# Patient Record
Sex: FEMALE | Race: WHITE | Employment: OTHER | URBAN - METROPOLITAN AREA
[De-identification: names, ages, dates, MRNs, and addresses within clinical notes are randomized per-mention and may not be internally consistent; named-entity substitution may affect disease eponyms.]

---

## 2018-08-25 ENCOUNTER — APPOINTMENT (OUTPATIENT)
Dept: CT IMAGING | Age: 83
End: 2018-08-25
Payer: MEDICARE

## 2018-08-25 ENCOUNTER — APPOINTMENT (OUTPATIENT)
Dept: GENERAL RADIOLOGY | Age: 83
End: 2018-08-25
Payer: MEDICARE

## 2018-08-25 ENCOUNTER — HOSPITAL ENCOUNTER (EMERGENCY)
Age: 83
Discharge: HOME | End: 2018-08-25
Attending: EMERGENCY MEDICINE
Payer: MEDICARE

## 2018-08-25 VITALS
SYSTOLIC BLOOD PRESSURE: 140 MMHG | RESPIRATION RATE: 16 BRPM | BODY MASS INDEX: 21.17 KG/M2 | TEMPERATURE: 98.1 F | HEART RATE: 79 BPM | WEIGHT: 105 LBS | OXYGEN SATURATION: 97 % | HEIGHT: 59 IN | DIASTOLIC BLOOD PRESSURE: 88 MMHG

## 2018-08-25 DIAGNOSIS — R10.84 GENERALIZED ABDOMINAL PAIN: Primary | ICD-10-CM

## 2018-08-25 DIAGNOSIS — R11.0 NAUSEA: ICD-10-CM

## 2018-08-25 LAB
ALBUMIN SERPL-MCNC: 4.1 G/DL (ref 3.5–5.2)
ALP BLD-CCNC: 166 U/L (ref 35–104)
ALT SERPL-CCNC: 36 U/L (ref 0–32)
ANION GAP SERPL CALCULATED.3IONS-SCNC: 12 MMOL/L (ref 7–16)
AST SERPL-CCNC: 19 U/L (ref 0–31)
BACTERIA: NORMAL /HPF
BASOPHILS ABSOLUTE: 0.07 E9/L (ref 0–0.2)
BASOPHILS RELATIVE PERCENT: 0.9 % (ref 0–2)
BILIRUB SERPL-MCNC: 0.3 MG/DL (ref 0–1.2)
BILIRUBIN URINE: NEGATIVE
BLOOD, URINE: ABNORMAL
BUN BLDV-MCNC: 14 MG/DL (ref 8–23)
CALCIUM SERPL-MCNC: 9.3 MG/DL (ref 8.6–10.2)
CHLORIDE BLD-SCNC: 101 MMOL/L (ref 98–107)
CLARITY: CLEAR
CO2: 28 MMOL/L (ref 22–29)
COLOR: ABNORMAL
CREAT SERPL-MCNC: 0.9 MG/DL (ref 0.5–1)
EKG ATRIAL RATE: 73 BPM
EKG P AXIS: 84 DEGREES
EKG P-R INTERVAL: 146 MS
EKG Q-T INTERVAL: 378 MS
EKG QRS DURATION: 90 MS
EKG QTC CALCULATION (BAZETT): 416 MS
EKG R AXIS: -45 DEGREES
EKG T AXIS: 29 DEGREES
EKG VENTRICULAR RATE: 73 BPM
EOSINOPHILS ABSOLUTE: 0.13 E9/L (ref 0.05–0.5)
EOSINOPHILS RELATIVE PERCENT: 1.7 % (ref 0–6)
GFR AFRICAN AMERICAN: >60
GFR NON-AFRICAN AMERICAN: 60 ML/MIN/1.73
GLUCOSE BLD-MCNC: 101 MG/DL (ref 74–109)
GLUCOSE URINE: NEGATIVE MG/DL
HCT VFR BLD CALC: 40.2 % (ref 34–48)
HEMOGLOBIN: 13.3 G/DL (ref 11.5–15.5)
IMMATURE GRANULOCYTES #: 0.08 E9/L
IMMATURE GRANULOCYTES %: 1.1 % (ref 0–5)
KETONES, URINE: NEGATIVE MG/DL
LEUKOCYTE ESTERASE, URINE: ABNORMAL
LIPASE: 50 U/L (ref 13–60)
LYMPHOCYTES ABSOLUTE: 2.28 E9/L (ref 1.5–4)
LYMPHOCYTES RELATIVE PERCENT: 30.3 % (ref 20–42)
MCH RBC QN AUTO: 28.7 PG (ref 26–35)
MCHC RBC AUTO-ENTMCNC: 33.1 % (ref 32–34.5)
MCV RBC AUTO: 86.8 FL (ref 80–99.9)
MONOCYTES ABSOLUTE: 0.56 E9/L (ref 0.1–0.95)
MONOCYTES RELATIVE PERCENT: 7.4 % (ref 2–12)
NEUTROPHILS ABSOLUTE: 4.41 E9/L (ref 1.8–7.3)
NEUTROPHILS RELATIVE PERCENT: 58.6 % (ref 43–80)
NITRITE, URINE: NEGATIVE
PDW BLD-RTO: 12.6 FL (ref 11.5–15)
PH UA: 8.5 (ref 5–9)
PLATELET # BLD: 248 E9/L (ref 130–450)
PMV BLD AUTO: 9.6 FL (ref 7–12)
POTASSIUM SERPL-SCNC: 4 MMOL/L (ref 3.5–5)
PROTEIN UA: NEGATIVE MG/DL
RBC # BLD: 4.63 E12/L (ref 3.5–5.5)
RBC UA: NORMAL /HPF (ref 0–2)
SODIUM BLD-SCNC: 141 MMOL/L (ref 132–146)
SPECIFIC GRAVITY UA: <=1.005 (ref 1–1.03)
TOTAL PROTEIN: 7.2 G/DL (ref 6.4–8.3)
TROPONIN: <0.01 NG/ML (ref 0–0.03)
UROBILINOGEN, URINE: 0.2 E.U./DL
WBC # BLD: 7.5 E9/L (ref 4.5–11.5)
WBC UA: NORMAL /HPF (ref 0–5)

## 2018-08-25 PROCEDURE — 81001 URINALYSIS AUTO W/SCOPE: CPT

## 2018-08-25 PROCEDURE — 80053 COMPREHEN METABOLIC PANEL: CPT

## 2018-08-25 PROCEDURE — 84484 ASSAY OF TROPONIN QUANT: CPT

## 2018-08-25 PROCEDURE — 85025 COMPLETE CBC W/AUTO DIFF WBC: CPT

## 2018-08-25 PROCEDURE — 93005 ELECTROCARDIOGRAM TRACING: CPT | Performed by: STUDENT IN AN ORGANIZED HEALTH CARE EDUCATION/TRAINING PROGRAM

## 2018-08-25 PROCEDURE — 74177 CT ABD & PELVIS W/CONTRAST: CPT

## 2018-08-25 PROCEDURE — 99285 EMERGENCY DEPT VISIT HI MDM: CPT

## 2018-08-25 PROCEDURE — 71045 X-RAY EXAM CHEST 1 VIEW: CPT

## 2018-08-25 PROCEDURE — 6360000004 HC RX CONTRAST MEDICATION: Performed by: RADIOLOGY

## 2018-08-25 PROCEDURE — 83690 ASSAY OF LIPASE: CPT

## 2018-08-25 RX ORDER — OMEPRAZOLE 20 MG/1
20 CAPSULE, DELAYED RELEASE ORAL EVERY 12 HOURS
Qty: 28 CAPSULE | Refills: 0 | Status: SHIPPED | OUTPATIENT
Start: 2018-08-25 | End: 2021-08-30

## 2018-08-25 RX ORDER — ONDANSETRON 2 MG/ML
4 INJECTION INTRAMUSCULAR; INTRAVENOUS ONCE
Status: DISCONTINUED | OUTPATIENT
Start: 2018-08-25 | End: 2018-08-25

## 2018-08-25 RX ORDER — ONDANSETRON 4 MG/1
4 TABLET, FILM COATED ORAL EVERY 8 HOURS PRN
Qty: 20 TABLET | Refills: 0 | Status: SHIPPED | OUTPATIENT
Start: 2018-08-25 | End: 2021-08-30

## 2018-08-25 RX ORDER — PANTOPRAZOLE SODIUM 40 MG/10ML
40 INJECTION, POWDER, LYOPHILIZED, FOR SOLUTION INTRAVENOUS ONCE
Status: DISCONTINUED | OUTPATIENT
Start: 2018-08-25 | End: 2018-08-25

## 2018-08-25 RX ADMIN — IOPAMIDOL 110 ML: 755 INJECTION, SOLUTION INTRAVENOUS at 18:33

## 2018-08-25 ASSESSMENT — ENCOUNTER SYMPTOMS
COUGH: 0
TROUBLE SWALLOWING: 0
DIARRHEA: 0
HEMATOCHEZIA: 0
CHEST TIGHTNESS: 0
SORE THROAT: 0
ABDOMINAL PAIN: 1
VOMITING: 0
NAUSEA: 0
WHEEZING: 0
CONSTIPATION: 0
SHORTNESS OF BREATH: 0
BLOOD IN STOOL: 0
HEMATEMESIS: 0

## 2018-08-25 ASSESSMENT — PAIN DESCRIPTION - DESCRIPTORS: DESCRIPTORS: NAGGING;ACHING

## 2018-08-25 ASSESSMENT — PAIN DESCRIPTION - ORIENTATION: ORIENTATION: MID

## 2018-08-25 ASSESSMENT — PAIN DESCRIPTION - PAIN TYPE: TYPE: ACUTE PAIN

## 2018-08-25 ASSESSMENT — PAIN SCALES - GENERAL: PAINLEVEL_OUTOF10: 5

## 2018-08-25 ASSESSMENT — PAIN DESCRIPTION - LOCATION: LOCATION: ABDOMEN

## 2018-08-25 NOTE — ED PROVIDER NOTES
Patient is an 80year old female who presents to the emergency room for evaluation of upper abdominal pain. Patient states that she started having epigastric abdominal pain starting 2 weeks ago. Pain is described as an aching, gnawing pain that is worse when she is up walking and nothing seems to make better. Patient tried taking Mylanta at home with no relief. She went to her primary care physician's office 2 weeks ago and had a abdominal x-ray performed which was unremarkable. Patient states that she had similar pain 3 years ago after having an ERCP done and had 4 gallstones removed. The patient ended up with pancreatitis as a result of the ERCP. Patient states because of her history she feels she needs a scope or CT done to evaluate her abdomen. Denies fever, chills, nausea, vomiting, chest pain, shortness of breath, change in bowel habits, hematochezia, hematuria, dysuria. The history is provided by the patient. Abdominal Pain   Pain location:  RUQ and epigastric  Pain quality: aching and gnawing    Pain radiates to:  Does not radiate  Pain severity:  Moderate  Onset quality:  Sudden  Duration:  2 weeks  Timing:  Constant  Progression:  Waxing and waning  Context: previous surgery    Context: not recent illness    Relieved by:  Nothing  Worsened by: Movement  Ineffective treatments:  OTC medications and lying down  Associated symptoms: no anorexia, no chest pain, no chills, no constipation, no cough, no diarrhea, no dysuria, no fatigue, no fever, no hematemesis, no hematochezia, no hematuria, no melena, no nausea, no shortness of breath, no sore throat and no vomiting    Risk factors: being elderly    Risk factors: no recent hospitalization        Review of Systems   Constitutional: Negative for chills, diaphoresis, fatigue and fever. HENT: Negative for sore throat and trouble swallowing. Respiratory: Negative for cough, chest tightness, shortness of breath and wheezing.     Cardiovascular: Negative for chest pain. Gastrointestinal: Positive for abdominal pain. Negative for anorexia, blood in stool, constipation, diarrhea, hematemesis, hematochezia, melena, nausea and vomiting. Genitourinary: Negative for difficulty urinating, dysuria, frequency, hematuria and urgency. Musculoskeletal: Negative for myalgias. Skin: Negative for rash. Neurological: Negative for dizziness, syncope, weakness, light-headedness, numbness and headaches. Psychiatric/Behavioral: Negative for confusion. Physical Exam   Constitutional: She is oriented to person, place, and time. She appears well-developed and well-nourished. No distress. HENT:   Head: Normocephalic and atraumatic. Mouth/Throat: No oropharyngeal exudate. Eyes: Pupils are equal, round, and reactive to light. Conjunctivae and EOM are normal. No scleral icterus. Neck: Normal range of motion. Neck supple. No JVD present. No tracheal deviation present. Cardiovascular: Normal rate, regular rhythm, normal heart sounds and intact distal pulses. No murmur heard. Pulmonary/Chest: Effort normal and breath sounds normal. No respiratory distress. She has no wheezes. She exhibits no tenderness. Abdominal: Soft. Bowel sounds are normal. She exhibits no distension. There is tenderness. There is no rebound and no guarding. Musculoskeletal: She exhibits no edema. Neurological: She is alert and oriented to person, place, and time. Skin: Skin is warm and dry. No rash noted. She is not diaphoretic. Procedures    MDM  Number of Diagnoses or Management Options  Diagnosis management comments: Patient is an 24-year-old female presents to the emergency department for evaluation of abdominal pain ×2 weeks. Patient had an ERCP 3 years ago and ended up with pancreatitis because of it. Patient states the pain is very similar to that episode. As the pain is epigastric in the right upper quadrant pancreatitis is considered again.  Blood work was unremarkable. EKG was normal sinus rhythm and consistent with previous EKG. CT of the abdomen and pelvis with IV contrast which showed pneumobilia which was present on the patient's previous CT scan from 2015. Patient was discharged home with a prescription for omeprazole and Zofran. She was instructed that she should follow up with her primary care physician and follow up with a gastroenterologist for possible endoscopy.              --------------------------------------------- PAST HISTORY ---------------------------------------------  Past Medical History:  has a past medical history of Abdominal pain; Anxiety; and PONV (postoperative nausea and vomiting). Past Surgical History:  has a past surgical history that includes Cholecystectomy; Colonoscopy; Endoscopy, colon, diagnostic; ERCP (6-24-15); and eye surgery. Social History:  reports that she has never smoked. She has never used smokeless tobacco. She reports that she does not drink alcohol or use drugs. Family History: family history includes Cancer in her father; Other in her mother. The patients home medications have been reviewed. Allergies:  Other    -------------------------------------------------- RESULTS -------------------------------------------------  Labs:  Results for orders placed or performed during the hospital encounter of 08/25/18   Comprehensive Metabolic Panel   Result Value Ref Range    Sodium 141 132 - 146 mmol/L    Potassium 4.0 3.5 - 5.0 mmol/L    Chloride 101 98 - 107 mmol/L    CO2 28 22 - 29 mmol/L    Anion Gap 12 7 - 16 mmol/L    Glucose 101 74 - 109 mg/dL    BUN 14 8 - 23 mg/dL    CREATININE 0.9 0.5 - 1.0 mg/dL    GFR Non-African American 60 >=60 mL/min/1.73    GFR African American >60     Calcium 9.3 8.6 - 10.2 mg/dL    Total Protein 7.2 6.4 - 8.3 g/dL    Alb 4.1 3.5 - 5.2 g/dL    Total Bilirubin 0.3 0.0 - 1.2 mg/dL    Alkaline Phosphatase 166 (H) 35 - 104 U/L    ALT 36 (H) 0 - 32 U/L    AST 19 0 - 31 U/L   CBC discussed in detail and they are aware of the specific conditions for emergent return, as well as the importance of follow-up. New Prescriptions    OMEPRAZOLE (PRILOSEC) 20 MG DELAYED RELEASE CAPSULE    Take 1 capsule by mouth every 12 hours for 14 days    ONDANSETRON (ZOFRAN) 4 MG TABLET    Take 1 tablet by mouth every 8 hours as needed for Nausea or Vomiting       Diagnosis:  1. Generalized abdominal pain    2. Nausea        Disposition:  Patient's disposition: Discharge to home  Patient's condition is stable.          Lizbeth Klein, DO  Resident  08/25/18 2010

## 2018-11-14 ENCOUNTER — HOSPITAL ENCOUNTER (EMERGENCY)
Age: 83
Discharge: OTHER INSTITUTION | End: 2018-11-15
Attending: EMERGENCY MEDICINE
Payer: MEDICARE

## 2018-11-14 DIAGNOSIS — K83.09 ASCENDING CHOLANGITIS: ICD-10-CM

## 2018-11-14 DIAGNOSIS — R10.13 EPIGASTRIC PAIN: ICD-10-CM

## 2018-11-14 DIAGNOSIS — R11.2 NAUSEA AND VOMITING, INTRACTABILITY OF VOMITING NOT SPECIFIED, UNSPECIFIED VOMITING TYPE: Primary | ICD-10-CM

## 2018-11-14 LAB
ALBUMIN SERPL-MCNC: 3.8 G/DL (ref 3.5–5.2)
ALP BLD-CCNC: 260 U/L (ref 35–104)
ALT SERPL-CCNC: 645 U/L (ref 0–32)
AMORPHOUS: ABNORMAL
ANION GAP SERPL CALCULATED.3IONS-SCNC: 17 MMOL/L (ref 7–16)
AST SERPL-CCNC: 1660 U/L (ref 0–31)
BACTERIA: ABNORMAL /HPF
BILIRUB SERPL-MCNC: 1.8 MG/DL (ref 0–1.2)
BILIRUBIN URINE: NEGATIVE
BLOOD, URINE: ABNORMAL
BUN BLDV-MCNC: 14 MG/DL (ref 8–23)
CALCIUM SERPL-MCNC: 9.1 MG/DL (ref 8.6–10.2)
CHLORIDE BLD-SCNC: 105 MMOL/L (ref 98–107)
CLARITY: CLEAR
CO2: 21 MMOL/L (ref 22–29)
COLOR: YELLOW
CREAT SERPL-MCNC: 0.9 MG/DL (ref 0.5–1)
EKG ATRIAL RATE: 112 BPM
EKG P AXIS: 71 DEGREES
EKG P-R INTERVAL: 158 MS
EKG Q-T INTERVAL: 324 MS
EKG QRS DURATION: 90 MS
EKG QTC CALCULATION (BAZETT): 442 MS
EKG R AXIS: -45 DEGREES
EKG T AXIS: 60 DEGREES
EKG VENTRICULAR RATE: 112 BPM
GFR AFRICAN AMERICAN: >60
GFR NON-AFRICAN AMERICAN: 60 ML/MIN/1.73
GLUCOSE BLD-MCNC: 160 MG/DL (ref 74–99)
GLUCOSE URINE: NEGATIVE MG/DL
HCT VFR BLD CALC: 39.2 % (ref 34–48)
HEMOGLOBIN: 13 G/DL (ref 11.5–15.5)
KETONES, URINE: NEGATIVE MG/DL
LACTIC ACID: 4.3 MMOL/L (ref 0.5–2.2)
LEUKOCYTE ESTERASE, URINE: NEGATIVE
LIPASE: 55 U/L (ref 13–60)
MCH RBC QN AUTO: 28.4 PG (ref 26–35)
MCHC RBC AUTO-ENTMCNC: 33.2 % (ref 32–34.5)
MCV RBC AUTO: 85.8 FL (ref 80–99.9)
NITRITE, URINE: NEGATIVE
PDW BLD-RTO: 12.9 FL (ref 11.5–15)
PH UA: 6 (ref 5–9)
PLATELET # BLD: 146 E9/L (ref 130–450)
PMV BLD AUTO: 9.3 FL (ref 7–12)
POTASSIUM SERPL-SCNC: 3.7 MMOL/L (ref 3.5–5)
PROTEIN UA: ABNORMAL MG/DL
RBC # BLD: 4.57 E12/L (ref 3.5–5.5)
RBC UA: ABNORMAL /HPF (ref 0–2)
SODIUM BLD-SCNC: 143 MMOL/L (ref 132–146)
SPECIFIC GRAVITY UA: 1.02 (ref 1–1.03)
TOTAL PROTEIN: 6.5 G/DL (ref 6.4–8.3)
TROPONIN: <0.01 NG/ML (ref 0–0.03)
UROBILINOGEN, URINE: 1 E.U./DL
WBC # BLD: 9.5 E9/L (ref 4.5–11.5)
WBC UA: ABNORMAL /HPF (ref 0–5)

## 2018-11-14 PROCEDURE — 84484 ASSAY OF TROPONIN QUANT: CPT

## 2018-11-14 PROCEDURE — 80053 COMPREHEN METABOLIC PANEL: CPT

## 2018-11-14 PROCEDURE — 93005 ELECTROCARDIOGRAM TRACING: CPT | Performed by: EMERGENCY MEDICINE

## 2018-11-14 PROCEDURE — 6370000000 HC RX 637 (ALT 250 FOR IP): Performed by: EMERGENCY MEDICINE

## 2018-11-14 PROCEDURE — 81001 URINALYSIS AUTO W/SCOPE: CPT

## 2018-11-14 PROCEDURE — 96375 TX/PRO/DX INJ NEW DRUG ADDON: CPT

## 2018-11-14 PROCEDURE — 99284 EMERGENCY DEPT VISIT MOD MDM: CPT

## 2018-11-14 PROCEDURE — 83690 ASSAY OF LIPASE: CPT

## 2018-11-14 PROCEDURE — 6360000002 HC RX W HCPCS: Performed by: EMERGENCY MEDICINE

## 2018-11-14 PROCEDURE — 83605 ASSAY OF LACTIC ACID: CPT

## 2018-11-14 PROCEDURE — 85027 COMPLETE CBC AUTOMATED: CPT

## 2018-11-14 PROCEDURE — 2580000003 HC RX 258: Performed by: EMERGENCY MEDICINE

## 2018-11-14 PROCEDURE — 36415 COLL VENOUS BLD VENIPUNCTURE: CPT

## 2018-11-14 RX ORDER — MORPHINE SULFATE 2 MG/ML
2 INJECTION, SOLUTION INTRAMUSCULAR; INTRAVENOUS EVERY 4 HOURS PRN
Status: CANCELLED | OUTPATIENT
Start: 2018-11-14

## 2018-11-14 RX ORDER — 0.9 % SODIUM CHLORIDE 0.9 %
1000 INTRAVENOUS SOLUTION INTRAVENOUS ONCE
Status: COMPLETED | OUTPATIENT
Start: 2018-11-14 | End: 2018-11-15

## 2018-11-14 RX ORDER — SODIUM CHLORIDE 9 MG/ML
INJECTION, SOLUTION INTRAVENOUS CONTINUOUS
Status: CANCELLED | OUTPATIENT
Start: 2018-11-14

## 2018-11-14 RX ORDER — SODIUM CHLORIDE 0.9 % (FLUSH) 0.9 %
10 SYRINGE (ML) INJECTION PRN
Status: CANCELLED | OUTPATIENT
Start: 2018-11-14

## 2018-11-14 RX ORDER — ACETAMINOPHEN 650 MG/1
650 SUPPOSITORY RECTAL ONCE
Status: COMPLETED | OUTPATIENT
Start: 2018-11-15 | End: 2018-11-14

## 2018-11-14 RX ORDER — SODIUM CHLORIDE 0.9 % (FLUSH) 0.9 %
10 SYRINGE (ML) INJECTION EVERY 12 HOURS SCHEDULED
Status: CANCELLED | OUTPATIENT
Start: 2018-11-14

## 2018-11-14 RX ORDER — PROMETHAZINE HYDROCHLORIDE 25 MG/ML
6.25 INJECTION, SOLUTION INTRAMUSCULAR; INTRAVENOUS EVERY 6 HOURS PRN
Status: CANCELLED | OUTPATIENT
Start: 2018-11-14

## 2018-11-14 RX ORDER — CIPROFLOXACIN 2 MG/ML
400 INJECTION, SOLUTION INTRAVENOUS ONCE
Status: COMPLETED | OUTPATIENT
Start: 2018-11-15 | End: 2018-11-15

## 2018-11-14 RX ORDER — MORPHINE SULFATE 2 MG/ML
2 INJECTION, SOLUTION INTRAMUSCULAR; INTRAVENOUS ONCE
Status: COMPLETED | OUTPATIENT
Start: 2018-11-14 | End: 2018-11-14

## 2018-11-14 RX ORDER — 0.9 % SODIUM CHLORIDE 0.9 %
1000 INTRAVENOUS SOLUTION INTRAVENOUS ONCE
Status: DISCONTINUED | OUTPATIENT
Start: 2018-11-15 | End: 2018-11-15 | Stop reason: HOSPADM

## 2018-11-14 RX ORDER — ONDANSETRON 2 MG/ML
4 INJECTION INTRAMUSCULAR; INTRAVENOUS ONCE
Status: DISCONTINUED | OUTPATIENT
Start: 2018-11-14 | End: 2018-11-15 | Stop reason: HOSPADM

## 2018-11-14 RX ORDER — CIPROFLOXACIN 2 MG/ML
400 INJECTION, SOLUTION INTRAVENOUS ONCE
Status: DISCONTINUED | OUTPATIENT
Start: 2018-11-15 | End: 2018-11-14

## 2018-11-14 RX ORDER — 0.9 % SODIUM CHLORIDE 0.9 %
1000 INTRAVENOUS SOLUTION INTRAVENOUS ONCE
Status: COMPLETED | OUTPATIENT
Start: 2018-11-14 | End: 2018-11-14

## 2018-11-14 RX ORDER — ONDANSETRON 2 MG/ML
4 INJECTION INTRAMUSCULAR; INTRAVENOUS EVERY 6 HOURS PRN
Status: CANCELLED | OUTPATIENT
Start: 2018-11-14

## 2018-11-14 RX ADMIN — ACETAMINOPHEN 650 MG: 650 SUPPOSITORY RECTAL at 23:58

## 2018-11-14 RX ADMIN — SODIUM CHLORIDE 1000 ML: 9 INJECTION, SOLUTION INTRAVENOUS at 19:22

## 2018-11-14 RX ADMIN — SODIUM CHLORIDE 1000 ML: 9 INJECTION, SOLUTION INTRAVENOUS at 23:35

## 2018-11-14 RX ADMIN — MORPHINE SULFATE 2 MG: 2 INJECTION, SOLUTION INTRAMUSCULAR; INTRAVENOUS at 21:21

## 2018-11-14 ASSESSMENT — PAIN SCALES - GENERAL
PAINLEVEL_OUTOF10: 0
PAINLEVEL_OUTOF10: 8

## 2018-11-14 NOTE — ED PROVIDER NOTES
Department of Emergency Medicine   ED  Provider Note  Admit Date/RoomTime: 11/14/2018  6:39 PM  ED Room: [address] [place] [address]      History of Present Illness:  11/14/18, Time: 6:53 PM       Nitza York is a 80 y.o. female presenting to the ED for emesis, beginning today. The complaint has been intermittent, moderate in severity, and worsened by nothing. Pt reports that she has started having multiple episodes of emesis at home today around 1:30pm where she was given Zofran 4mg via EMS en route to the ED. Reports that she was seen for the same complaint last month. C/o pain in her epigastric region as well. Patient reports no hematuria, hematochezia, hematemesis, diarrhea, chest pain or difficulty breathing. Patient reports no fever, chills, cough, congestion or any other further symptoms at this time. Review of Systems:   Pertinent positives and negatives are stated within HPI, all other systems reviewed and are negative.    --------------------------------------------- PAST HISTORY ---------------------------------------------  Past Medical History:  has a past medical history of Abdominal pain; Anxiety; and PONV (postoperative nausea and vomiting). Past Surgical History:  has a past surgical history that includes Cholecystectomy; Colonoscopy; Endoscopy, colon, diagnostic; ERCP (6-24-15); and eye surgery. Social History:  reports that she has never smoked. She has never used smokeless tobacco. She reports that she does not drink alcohol or use drugs. Family History: family history includes Cancer in her father; Other in her mother. The patients home medications have been reviewed. Allergies:  Other      ---------------------------------------------------PHYSICAL EXAM--------------------------------------    Constitutional/General: Awake, Alert, and oriented x3, mild distress  Head: Normocephalic and atraumatic  Eyes: PERRL, EOMI,  Mouth: Oropharynx clear, handling secretions, no trismus  Neck:

## 2018-11-15 ENCOUNTER — APPOINTMENT (OUTPATIENT)
Dept: GENERAL RADIOLOGY | Age: 83
DRG: 871 | End: 2018-11-15
Attending: HOSPITALIST
Payer: MEDICARE

## 2018-11-15 ENCOUNTER — ANESTHESIA EVENT (OUTPATIENT)
Dept: OPERATING ROOM | Age: 83
DRG: 871 | End: 2018-11-15
Payer: MEDICARE

## 2018-11-15 ENCOUNTER — HOSPITAL ENCOUNTER (INPATIENT)
Age: 83
LOS: 11 days | Discharge: NURSING, REHAB OR LONG-TERM CARE | DRG: 871 | End: 2018-11-26
Attending: HOSPITALIST | Admitting: HOSPITALIST
Payer: MEDICARE

## 2018-11-15 ENCOUNTER — ANESTHESIA (OUTPATIENT)
Dept: OPERATING ROOM | Age: 83
DRG: 871 | End: 2018-11-15
Payer: MEDICARE

## 2018-11-15 ENCOUNTER — APPOINTMENT (OUTPATIENT)
Dept: CT IMAGING | Age: 83
End: 2018-11-15
Payer: MEDICARE

## 2018-11-15 ENCOUNTER — HOSPITAL ENCOUNTER (OUTPATIENT)
Age: 83
Discharge: HOME | End: 2018-11-15
Payer: MEDICARE

## 2018-11-15 VITALS
BODY MASS INDEX: 20.96 KG/M2 | TEMPERATURE: 101.8 F | DIASTOLIC BLOOD PRESSURE: 65 MMHG | RESPIRATION RATE: 29 BRPM | HEIGHT: 59 IN | WEIGHT: 104 LBS | HEART RATE: 114 BPM | OXYGEN SATURATION: 97 % | SYSTOLIC BLOOD PRESSURE: 111 MMHG

## 2018-11-15 VITALS
RESPIRATION RATE: 1 BRPM | OXYGEN SATURATION: 99 % | TEMPERATURE: 98.1 F | DIASTOLIC BLOOD PRESSURE: 55 MMHG | SYSTOLIC BLOOD PRESSURE: 99 MMHG

## 2018-11-15 DIAGNOSIS — R52 PAIN: ICD-10-CM

## 2018-11-15 DIAGNOSIS — A41.9 SEVERE SEPSIS (HCC): ICD-10-CM

## 2018-11-15 DIAGNOSIS — R65.20 SEVERE SEPSIS (HCC): ICD-10-CM

## 2018-11-15 DIAGNOSIS — K83.09 ASCENDING CHOLANGITIS: Primary | ICD-10-CM

## 2018-11-15 DIAGNOSIS — E87.20 LACTIC ACIDOSIS: ICD-10-CM

## 2018-11-15 PROBLEM — R11.2 INTRACTABLE NAUSEA AND VOMITING: Status: ACTIVE | Noted: 2018-11-15

## 2018-11-15 PROBLEM — K21.9 GERD (GASTROESOPHAGEAL REFLUX DISEASE): Status: ACTIVE | Noted: 2018-11-15

## 2018-11-15 PROBLEM — R00.0 SINUS TACHYCARDIA: Status: ACTIVE | Noted: 2018-11-15

## 2018-11-15 LAB
ALBUMIN SERPL-MCNC: 2.7 G/DL (ref 3.5–5.2)
ALBUMIN SERPL-MCNC: 2.9 G/DL (ref 3.5–5.2)
ALP BLD-CCNC: 168 U/L (ref 35–104)
ALP BLD-CCNC: 210 U/L (ref 35–104)
ALT SERPL-CCNC: 584 U/L (ref 0–32)
ALT SERPL-CCNC: 759 U/L (ref 0–32)
ANION GAP SERPL CALCULATED.3IONS-SCNC: 14 MMOL/L (ref 7–16)
ANION GAP SERPL CALCULATED.3IONS-SCNC: 20 MMOL/L (ref 7–16)
ANISOCYTOSIS: ABNORMAL
AST SERPL-CCNC: 1687 U/L (ref 0–31)
AST SERPL-CCNC: 992 U/L (ref 0–31)
B.E.: -11.4 MMOL/L (ref -3–3)
BASOPHILS ABSOLUTE: 0 E9/L (ref 0–0.2)
BASOPHILS RELATIVE PERCENT: 0.3 % (ref 0–2)
BILIRUB SERPL-MCNC: 3.6 MG/DL (ref 0–1.2)
BILIRUB SERPL-MCNC: 3.6 MG/DL (ref 0–1.2)
BILIRUB SERPL-MCNC: 3.8 MG/DL (ref 0–1.2)
BUN BLDV-MCNC: 17 MG/DL (ref 8–23)
BUN BLDV-MCNC: 19 MG/DL (ref 8–23)
BURR CELLS: ABNORMAL
CALCIUM SERPL-MCNC: 6.9 MG/DL (ref 8.6–10.2)
CALCIUM SERPL-MCNC: 7.6 MG/DL (ref 8.6–10.2)
CHLORIDE BLD-SCNC: 106 MMOL/L (ref 98–107)
CHLORIDE BLD-SCNC: 114 MMOL/L (ref 98–107)
CO2: 16 MMOL/L (ref 22–29)
CO2: 17 MMOL/L (ref 22–29)
COHB: 0.3 % (ref 0–1.5)
COMMENT: ABNORMAL
CORTISOL TOTAL: 45.11 MCG/DL (ref 2.68–18.4)
CREAT SERPL-MCNC: 1.1 MG/DL (ref 0.5–1)
CREAT SERPL-MCNC: 1.3 MG/DL (ref 0.5–1)
CRITICAL: ABNORMAL
DATE ANALYZED: ABNORMAL
DATE OF COLLECTION: ABNORMAL
EOSINOPHILS ABSOLUTE: 0 E9/L (ref 0.05–0.5)
EOSINOPHILS RELATIVE PERCENT: 0.1 % (ref 0–6)
GFR AFRICAN AMERICAN: 47
GFR AFRICAN AMERICAN: 57
GFR NON-AFRICAN AMERICAN: 39 ML/MIN/1.73
GFR NON-AFRICAN AMERICAN: 47 ML/MIN/1.73
GLUCOSE BLD-MCNC: 126 MG/DL (ref 74–99)
GLUCOSE BLD-MCNC: 96 MG/DL (ref 74–99)
HCO3: 17.3 MMOL/L (ref 22–26)
HCT VFR BLD CALC: 32.8 % (ref 34–48)
HCT VFR BLD CALC: 34.7 % (ref 34–48)
HEMOGLOBIN: 10.6 G/DL (ref 11.5–15.5)
HEMOGLOBIN: 11.2 G/DL (ref 11.5–15.5)
HHB: 4.3 % (ref 0–5)
INR BLD: 1.3
LAB: ABNORMAL
LACTIC ACID: 3.6 MMOL/L (ref 0.5–2.2)
LACTIC ACID: 3.9 MMOL/L (ref 0.5–2.2)
LACTIC ACID: 6.6 MMOL/L (ref 0.5–2.2)
LIPASE: 33 U/L (ref 13–60)
LYMPHOCYTES ABSOLUTE: 0.64 E9/L (ref 1.5–4)
LYMPHOCYTES RELATIVE PERCENT: 7.9 % (ref 20–42)
Lab: ABNORMAL
MAGNESIUM: 1.3 MG/DL (ref 1.6–2.6)
MAGNESIUM: 1.5 MG/DL (ref 1.6–2.6)
MCH RBC QN AUTO: 28.4 PG (ref 26–35)
MCH RBC QN AUTO: 28.9 PG (ref 26–35)
MCHC RBC AUTO-ENTMCNC: 32.3 % (ref 32–34.5)
MCHC RBC AUTO-ENTMCNC: 32.3 % (ref 32–34.5)
MCV RBC AUTO: 87.8 FL (ref 80–99.9)
MCV RBC AUTO: 89.4 FL (ref 80–99.9)
METHB: 0.3 % (ref 0–1.5)
MODE: ABNORMAL
MONOCYTES ABSOLUTE: 0.16 E9/L (ref 0.1–0.95)
MONOCYTES RELATIVE PERCENT: 1.8 % (ref 2–12)
NEUTROPHILS ABSOLUTE: 7.2 E9/L (ref 1.8–7.3)
NEUTROPHILS RELATIVE PERCENT: 90.4 % (ref 43–80)
O2 CONTENT: 15.2 ML/DL
O2 SATURATION: 95.7 % (ref 92–98.5)
O2HB: 95.1 % (ref 94–97)
OPERATOR ID: ABNORMAL
OVALOCYTES: ABNORMAL
PATIENT TEMP: 37 C
PCO2: 51.1 MMHG (ref 35–45)
PDW BLD-RTO: 12.9 FL (ref 11.5–15)
PDW BLD-RTO: 13.4 FL (ref 11.5–15)
PH BLOOD GAS: 7.15 (ref 7.35–7.45)
PHOSPHORUS: 2.2 MG/DL (ref 2.5–4.5)
PHOSPHORUS: 3.6 MG/DL (ref 2.5–4.5)
PLATELET # BLD: 93 E9/L (ref 130–450)
PLATELET # BLD: 99 E9/L (ref 130–450)
PLATELET CONFIRMATION: NORMAL
PLATELET CONFIRMATION: NORMAL
PMV BLD AUTO: 10.5 FL (ref 7–12)
PMV BLD AUTO: 9.5 FL (ref 7–12)
PO2: 92.3 MMHG (ref 60–100)
POIKILOCYTES: ABNORMAL
POTASSIUM REFLEX MAGNESIUM: 3.3 MMOL/L (ref 3.5–5)
POTASSIUM SERPL-SCNC: 3 MMOL/L (ref 3.5–5)
PROCALCITONIN: >100 NG/ML (ref 0–0.08)
PROTHROMBIN TIME: 14.2 SEC (ref 9.3–12.4)
RBC # BLD: 3.67 E12/L (ref 3.5–5.5)
RBC # BLD: 3.95 E12/L (ref 3.5–5.5)
SODIUM BLD-SCNC: 142 MMOL/L (ref 132–146)
SODIUM BLD-SCNC: 145 MMOL/L (ref 132–146)
SOURCE, BLOOD GAS: ABNORMAL
THB: 11.3 G/DL (ref 11.5–16.5)
TIME ANALYZED: 1302
TOTAL CK: 147 U/L (ref 20–180)
TOTAL PROTEIN: 4.9 G/DL (ref 6.4–8.3)
TOTAL PROTEIN: 5.1 G/DL (ref 6.4–8.3)
VACUOLATED NEUTROPHILS: ABNORMAL
WBC # BLD: 32.4 E9/L (ref 4.5–11.5)
WBC # BLD: 8 E9/L (ref 4.5–11.5)

## 2018-11-15 PROCEDURE — 2720000010 HC SURG SUPPLY STERILE: Performed by: SPECIALIST

## 2018-11-15 PROCEDURE — 96365 THER/PROPH/DIAG IV INF INIT: CPT

## 2018-11-15 PROCEDURE — B548ZZA ULTRASONOGRAPHY OF SUPERIOR VENA CAVA, GUIDANCE: ICD-10-PCS | Performed by: HOSPITALIST

## 2018-11-15 PROCEDURE — 2580000003 HC RX 258: Performed by: INTERNAL MEDICINE

## 2018-11-15 PROCEDURE — 85027 COMPLETE CBC AUTOMATED: CPT

## 2018-11-15 PROCEDURE — 02HV33Z INSERTION OF INFUSION DEVICE INTO SUPERIOR VENA CAVA, PERCUTANEOUS APPROACH: ICD-10-PCS | Performed by: HOSPITALIST

## 2018-11-15 PROCEDURE — 83690 ASSAY OF LIPASE: CPT

## 2018-11-15 PROCEDURE — 85025 COMPLETE CBC W/AUTO DIFF WBC: CPT

## 2018-11-15 PROCEDURE — 6360000004 HC RX CONTRAST MEDICATION: Performed by: RADIOLOGY

## 2018-11-15 PROCEDURE — 2500000003 HC RX 250 WO HCPCS: Performed by: INTERNAL MEDICINE

## 2018-11-15 PROCEDURE — 3700000001 HC ADD 15 MINUTES (ANESTHESIA): Performed by: SPECIALIST

## 2018-11-15 PROCEDURE — 87081 CULTURE SCREEN ONLY: CPT

## 2018-11-15 PROCEDURE — 0F798DZ DILATION OF COMMON BILE DUCT WITH INTRALUMINAL DEVICE, VIA NATURAL OR ARTIFICIAL OPENING ENDOSCOPIC: ICD-10-PCS | Performed by: INTERNAL MEDICINE

## 2018-11-15 PROCEDURE — 82550 ASSAY OF CK (CPK): CPT

## 2018-11-15 PROCEDURE — 82533 TOTAL CORTISOL: CPT

## 2018-11-15 PROCEDURE — 36556 INSERT NON-TUNNEL CV CATH: CPT | Performed by: HOSPITALIST

## 2018-11-15 PROCEDURE — 80053 COMPREHEN METABOLIC PANEL: CPT

## 2018-11-15 PROCEDURE — C1769 GUIDE WIRE: HCPCS | Performed by: SPECIALIST

## 2018-11-15 PROCEDURE — 36592 COLLECT BLOOD FROM PICC: CPT

## 2018-11-15 PROCEDURE — C2625 STENT, NON-COR, TEM W/DEL SY: HCPCS | Performed by: SPECIALIST

## 2018-11-15 PROCEDURE — 3700000000 HC ANESTHESIA ATTENDED CARE: Performed by: SPECIALIST

## 2018-11-15 PROCEDURE — 83605 ASSAY OF LACTIC ACID: CPT

## 2018-11-15 PROCEDURE — 2709999900 HC NON-CHARGEABLE SUPPLY: Performed by: SPECIALIST

## 2018-11-15 PROCEDURE — 3600007512: Performed by: SPECIALIST

## 2018-11-15 PROCEDURE — 99291 CRITICAL CARE FIRST HOUR: CPT | Performed by: HOSPITALIST

## 2018-11-15 PROCEDURE — 2000000000 HC ICU R&B

## 2018-11-15 PROCEDURE — 2580000003 HC RX 258: Performed by: RADIOLOGY

## 2018-11-15 PROCEDURE — 2580000003 HC RX 258: Performed by: HOSPITALIST

## 2018-11-15 PROCEDURE — 83735 ASSAY OF MAGNESIUM: CPT

## 2018-11-15 PROCEDURE — 82805 BLOOD GASES W/O2 SATURATION: CPT

## 2018-11-15 PROCEDURE — 36556 INSERT NON-TUNNEL CV CATH: CPT

## 2018-11-15 PROCEDURE — 84100 ASSAY OF PHOSPHORUS: CPT

## 2018-11-15 PROCEDURE — B5181ZA FLUOROSCOPY OF SUPERIOR VENA CAVA USING LOW OSMOLAR CONTRAST, GUIDANCE: ICD-10-PCS | Performed by: HOSPITALIST

## 2018-11-15 PROCEDURE — 2500000003 HC RX 250 WO HCPCS: Performed by: NURSE ANESTHETIST, CERTIFIED REGISTERED

## 2018-11-15 PROCEDURE — A0426 ALS 1: HCPCS

## 2018-11-15 PROCEDURE — 87076 CULTURE ANAEROBE IDENT EACH: CPT

## 2018-11-15 PROCEDURE — 6360000002 HC RX W HCPCS: Performed by: HOSPITALIST

## 2018-11-15 PROCEDURE — 87040 BLOOD CULTURE FOR BACTERIA: CPT

## 2018-11-15 PROCEDURE — 6360000002 HC RX W HCPCS: Performed by: NURSE ANESTHETIST, CERTIFIED REGISTERED

## 2018-11-15 PROCEDURE — 2580000003 HC RX 258: Performed by: NURSE ANESTHETIST, CERTIFIED REGISTERED

## 2018-11-15 PROCEDURE — 6360000002 HC RX W HCPCS: Performed by: INTERNAL MEDICINE

## 2018-11-15 PROCEDURE — 36415 COLL VENOUS BLD VENIPUNCTURE: CPT

## 2018-11-15 PROCEDURE — 74330 X-RAY BILE/PANC ENDOSCOPY: CPT

## 2018-11-15 PROCEDURE — 74177 CT ABD & PELVIS W/CONTRAST: CPT

## 2018-11-15 PROCEDURE — 7100000000 HC PACU RECOVERY - FIRST 15 MIN

## 2018-11-15 PROCEDURE — C9113 INJ PANTOPRAZOLE SODIUM, VIA: HCPCS | Performed by: HOSPITALIST

## 2018-11-15 PROCEDURE — 96366 THER/PROPH/DIAG IV INF ADDON: CPT

## 2018-11-15 PROCEDURE — 3600007502: Performed by: SPECIALIST

## 2018-11-15 PROCEDURE — 87186 SC STD MICRODIL/AGAR DIL: CPT

## 2018-11-15 PROCEDURE — 2500000003 HC RX 250 WO HCPCS: Performed by: HOSPITALIST

## 2018-11-15 PROCEDURE — 85610 PROTHROMBIN TIME: CPT

## 2018-11-15 PROCEDURE — 84145 PROCALCITONIN (PCT): CPT

## 2018-11-15 PROCEDURE — 7100000001 HC PACU RECOVERY - ADDTL 15 MIN

## 2018-11-15 PROCEDURE — 82247 BILIRUBIN TOTAL: CPT

## 2018-11-15 PROCEDURE — 71045 X-RAY EXAM CHEST 1 VIEW: CPT

## 2018-11-15 PROCEDURE — A0425 GROUND MILEAGE: HCPCS

## 2018-11-15 PROCEDURE — 6360000002 HC RX W HCPCS: Performed by: EMERGENCY MEDICINE

## 2018-11-15 DEVICE — BILIARY STENT WITH NAVIFLEXTM RX DELIVERY SYSTEM
Type: IMPLANTABLE DEVICE | Site: BILE DUCT | Status: FUNCTIONAL
Brand: ADVANIX™ BILIARY

## 2018-11-15 RX ORDER — FENTANYL CITRATE 50 UG/ML
25 INJECTION, SOLUTION INTRAMUSCULAR; INTRAVENOUS
Status: DISCONTINUED | OUTPATIENT
Start: 2018-11-15 | End: 2018-11-26 | Stop reason: HOSPADM

## 2018-11-15 RX ORDER — 0.9 % SODIUM CHLORIDE 0.9 %
10 VIAL (ML) INJECTION EVERY 12 HOURS SCHEDULED
Status: DISCONTINUED | OUTPATIENT
Start: 2018-11-15 | End: 2018-11-26 | Stop reason: HOSPADM

## 2018-11-15 RX ORDER — ETOMIDATE 2 MG/ML
INJECTION INTRAVENOUS PRN
Status: DISCONTINUED | OUTPATIENT
Start: 2018-11-15 | End: 2018-11-15 | Stop reason: SDUPTHER

## 2018-11-15 RX ORDER — MAGNESIUM SULFATE IN WATER 40 MG/ML
2 INJECTION, SOLUTION INTRAVENOUS ONCE
Status: COMPLETED | OUTPATIENT
Start: 2018-11-15 | End: 2018-11-15

## 2018-11-15 RX ORDER — 0.9 % SODIUM CHLORIDE 0.9 %
500 INTRAVENOUS SOLUTION INTRAVENOUS ONCE
Status: COMPLETED | OUTPATIENT
Start: 2018-11-15 | End: 2018-11-15

## 2018-11-15 RX ORDER — ONDANSETRON 2 MG/ML
INJECTION INTRAMUSCULAR; INTRAVENOUS PRN
Status: DISCONTINUED | OUTPATIENT
Start: 2018-11-15 | End: 2018-11-15 | Stop reason: SDUPTHER

## 2018-11-15 RX ORDER — MORPHINE SULFATE 2 MG/ML
2 INJECTION, SOLUTION INTRAMUSCULAR; INTRAVENOUS EVERY 4 HOURS PRN
Status: DISCONTINUED | OUTPATIENT
Start: 2018-11-15 | End: 2018-11-15

## 2018-11-15 RX ORDER — SODIUM CHLORIDE 0.9 % (FLUSH) 0.9 %
10 SYRINGE (ML) INJECTION EVERY 12 HOURS SCHEDULED
Status: DISCONTINUED | OUTPATIENT
Start: 2018-11-15 | End: 2018-11-26 | Stop reason: HOSPADM

## 2018-11-15 RX ORDER — SODIUM CHLORIDE 9 MG/ML
INJECTION, SOLUTION INTRAVENOUS CONTINUOUS
Status: DISCONTINUED | OUTPATIENT
Start: 2018-11-15 | End: 2018-11-15 | Stop reason: SDUPTHER

## 2018-11-15 RX ORDER — PANTOPRAZOLE SODIUM 40 MG/10ML
40 INJECTION, POWDER, LYOPHILIZED, FOR SOLUTION INTRAVENOUS DAILY
Status: DISCONTINUED | OUTPATIENT
Start: 2018-11-15 | End: 2018-11-26 | Stop reason: HOSPADM

## 2018-11-15 RX ORDER — GLYCOPYRROLATE 1 MG/5 ML
SYRINGE (ML) INTRAVENOUS PRN
Status: DISCONTINUED | OUTPATIENT
Start: 2018-11-15 | End: 2018-11-15 | Stop reason: SDUPTHER

## 2018-11-15 RX ORDER — SODIUM CHLORIDE 0.9 % (FLUSH) 0.9 %
10 SYRINGE (ML) INJECTION PRN
Status: DISCONTINUED | OUTPATIENT
Start: 2018-11-15 | End: 2018-11-15 | Stop reason: SDUPTHER

## 2018-11-15 RX ORDER — SODIUM CHLORIDE 9 MG/ML
INJECTION, SOLUTION INTRAVENOUS CONTINUOUS
Status: DISCONTINUED | OUTPATIENT
Start: 2018-11-15 | End: 2018-11-17

## 2018-11-15 RX ORDER — PROMETHAZINE HYDROCHLORIDE 25 MG/ML
12.5 INJECTION, SOLUTION INTRAMUSCULAR; INTRAVENOUS EVERY 4 HOURS PRN
Status: DISCONTINUED | OUTPATIENT
Start: 2018-11-15 | End: 2018-11-26 | Stop reason: HOSPADM

## 2018-11-15 RX ORDER — 0.9 % SODIUM CHLORIDE 0.9 %
10 VIAL (ML) INJECTION PRN
Status: DISCONTINUED | OUTPATIENT
Start: 2018-11-15 | End: 2018-11-26 | Stop reason: HOSPADM

## 2018-11-15 RX ORDER — SODIUM CHLORIDE 0.9 % (FLUSH) 0.9 %
10 SYRINGE (ML) INJECTION PRN
Status: DISCONTINUED | OUTPATIENT
Start: 2018-11-15 | End: 2018-11-26 | Stop reason: HOSPADM

## 2018-11-15 RX ORDER — POTASSIUM CHLORIDE 29.8 MG/ML
20 INJECTION INTRAVENOUS PRN
Status: DISCONTINUED | OUTPATIENT
Start: 2018-11-15 | End: 2018-11-25

## 2018-11-15 RX ORDER — ROCURONIUM BROMIDE 10 MG/ML
INJECTION, SOLUTION INTRAVENOUS PRN
Status: DISCONTINUED | OUTPATIENT
Start: 2018-11-15 | End: 2018-11-15 | Stop reason: SDUPTHER

## 2018-11-15 RX ORDER — PROMETHAZINE HYDROCHLORIDE 25 MG/ML
6.25 INJECTION, SOLUTION INTRAMUSCULAR; INTRAVENOUS EVERY 6 HOURS PRN
Status: DISCONTINUED | OUTPATIENT
Start: 2018-11-15 | End: 2018-11-15

## 2018-11-15 RX ORDER — NEOSTIGMINE METHYLSULFATE 1 MG/ML
INJECTION, SOLUTION INTRAVENOUS PRN
Status: DISCONTINUED | OUTPATIENT
Start: 2018-11-15 | End: 2018-11-15 | Stop reason: SDUPTHER

## 2018-11-15 RX ORDER — MAGNESIUM SULFATE IN WATER 40 MG/ML
2 INJECTION, SOLUTION INTRAVENOUS ONCE
Status: DISCONTINUED | OUTPATIENT
Start: 2018-11-15 | End: 2018-11-20

## 2018-11-15 RX ORDER — SODIUM CHLORIDE 9 MG/ML
INJECTION, SOLUTION INTRAVENOUS CONTINUOUS PRN
Status: DISCONTINUED | OUTPATIENT
Start: 2018-11-15 | End: 2018-11-15 | Stop reason: SDUPTHER

## 2018-11-15 RX ORDER — ONDANSETRON 2 MG/ML
4 INJECTION INTRAMUSCULAR; INTRAVENOUS EVERY 6 HOURS PRN
Status: DISCONTINUED | OUTPATIENT
Start: 2018-11-15 | End: 2018-11-26 | Stop reason: HOSPADM

## 2018-11-15 RX ORDER — DEXAMETHASONE SODIUM PHOSPHATE 4 MG/ML
INJECTION, SOLUTION INTRA-ARTICULAR; INTRALESIONAL; INTRAMUSCULAR; INTRAVENOUS; SOFT TISSUE PRN
Status: DISCONTINUED | OUTPATIENT
Start: 2018-11-15 | End: 2018-11-15 | Stop reason: SDUPTHER

## 2018-11-15 RX ORDER — SODIUM CHLORIDE 0.9 % (FLUSH) 0.9 %
10 SYRINGE (ML) INJECTION EVERY 12 HOURS SCHEDULED
Status: DISCONTINUED | OUTPATIENT
Start: 2018-11-15 | End: 2018-11-15 | Stop reason: SDUPTHER

## 2018-11-15 RX ORDER — MAGNESIUM SULFATE 1 G/100ML
1 INJECTION INTRAVENOUS PRN
Status: DISCONTINUED | OUTPATIENT
Start: 2018-11-15 | End: 2018-11-25

## 2018-11-15 RX ORDER — POTASSIUM CHLORIDE 29.8 MG/ML
60 INJECTION INTRAVENOUS ONCE
Status: COMPLETED | OUTPATIENT
Start: 2018-11-15 | End: 2018-11-15

## 2018-11-15 RX ORDER — SODIUM CHLORIDE 0.9 % (FLUSH) 0.9 %
10 SYRINGE (ML) INJECTION PRN
Status: COMPLETED | OUTPATIENT
Start: 2018-11-15 | End: 2018-11-15

## 2018-11-15 RX ORDER — FENTANYL CITRATE 50 UG/ML
INJECTION, SOLUTION INTRAMUSCULAR; INTRAVENOUS PRN
Status: DISCONTINUED | OUTPATIENT
Start: 2018-11-15 | End: 2018-11-15 | Stop reason: SDUPTHER

## 2018-11-15 RX ORDER — MIDAZOLAM HYDROCHLORIDE 1 MG/ML
INJECTION INTRAMUSCULAR; INTRAVENOUS PRN
Status: DISCONTINUED | OUTPATIENT
Start: 2018-11-15 | End: 2018-11-15 | Stop reason: SDUPTHER

## 2018-11-15 RX ADMIN — MIDAZOLAM 2 MG: 1 INJECTION INTRAMUSCULAR; INTRAVENOUS at 10:15

## 2018-11-15 RX ADMIN — PHENYLEPHRINE HYDROCHLORIDE 100 MCG: 10 INJECTION INTRAVENOUS at 10:30

## 2018-11-15 RX ADMIN — GLUCAGON HYDROCHLORIDE 0.5 MG: KIT at 11:32

## 2018-11-15 RX ADMIN — PANTOPRAZOLE SODIUM 40 MG: 40 INJECTION, POWDER, FOR SOLUTION INTRAVENOUS at 08:30

## 2018-11-15 RX ADMIN — ETOMIDATE 4 MG: 2 INJECTION, SOLUTION INTRAVENOUS at 10:30

## 2018-11-15 RX ADMIN — Medication 0.1 MG: at 11:43

## 2018-11-15 RX ADMIN — ASCORBIC ACID: 500 INJECTION, SOLUTION INTRAMUSCULAR; INTRAVENOUS; SUBCUTANEOUS at 20:23

## 2018-11-15 RX ADMIN — IOPAMIDOL 100 ML: 755 INJECTION, SOLUTION INTRAVENOUS at 00:36

## 2018-11-15 RX ADMIN — FENTANYL CITRATE 25 MCG: 50 INJECTION INTRAMUSCULAR; INTRAVENOUS at 05:43

## 2018-11-15 RX ADMIN — Medication 10 ML: at 08:30

## 2018-11-15 RX ADMIN — Medication 20 MG: at 10:30

## 2018-11-15 RX ADMIN — SODIUM CHLORIDE: 9 INJECTION, SOLUTION INTRAVENOUS at 10:08

## 2018-11-15 RX ADMIN — ONDANSETRON HYDROCHLORIDE 4 MG: 2 INJECTION, SOLUTION INTRAMUSCULAR; INTRAVENOUS at 10:52

## 2018-11-15 RX ADMIN — THIAMINE HYDROCHLORIDE 200 MG: 100 INJECTION, SOLUTION INTRAMUSCULAR; INTRAVENOUS at 20:24

## 2018-11-15 RX ADMIN — MAGNESIUM SULFATE HEPTAHYDRATE 2 G: 40 INJECTION, SOLUTION INTRAVENOUS at 18:34

## 2018-11-15 RX ADMIN — TAZOBACTAM SODIUM AND PIPERACILLIN SODIUM 3.38 G: 375; 3 INJECTION, SOLUTION INTRAVENOUS at 04:38

## 2018-11-15 RX ADMIN — FENTANYL CITRATE 25 MCG: 50 INJECTION, SOLUTION INTRAMUSCULAR; INTRAVENOUS at 11:00

## 2018-11-15 RX ADMIN — FENTANYL CITRATE 25 MCG: 50 INJECTION INTRAMUSCULAR; INTRAVENOUS at 07:47

## 2018-11-15 RX ADMIN — Medication 10 ML: at 20:25

## 2018-11-15 RX ADMIN — HYDROCORTISONE SODIUM SUCCINATE 50 MG: 100 INJECTION, POWDER, FOR SOLUTION INTRAMUSCULAR; INTRAVENOUS at 20:22

## 2018-11-15 RX ADMIN — Medication 10 ML: at 09:19

## 2018-11-15 RX ADMIN — SODIUM CHLORIDE: 9 INJECTION, SOLUTION INTRAVENOUS at 13:38

## 2018-11-15 RX ADMIN — PHENYLEPHRINE HYDROCHLORIDE 100 MCG: 10 INJECTION INTRAVENOUS at 11:46

## 2018-11-15 RX ADMIN — SODIUM CHLORIDE 500 ML: 9 INJECTION, SOLUTION INTRAVENOUS at 03:45

## 2018-11-15 RX ADMIN — Medication 10 ML: at 00:36

## 2018-11-15 RX ADMIN — DEXAMETHASONE SODIUM PHOSPHATE 8 MG: 4 INJECTION, SOLUTION INTRA-ARTICULAR; INTRALESIONAL; INTRAMUSCULAR; INTRAVENOUS; SOFT TISSUE at 10:59

## 2018-11-15 RX ADMIN — Medication 3 MG: at 11:43

## 2018-11-15 RX ADMIN — CIPROFLOXACIN 400 MG: 2 INJECTION, SOLUTION INTRAVENOUS at 00:04

## 2018-11-15 RX ADMIN — TAZOBACTAM SODIUM AND PIPERACILLIN SODIUM 3.38 G: 375; 3 INJECTION, SOLUTION INTRAVENOUS at 19:55

## 2018-11-15 RX ADMIN — VANCOMYCIN HYDROCHLORIDE 1000 MG: 1 INJECTION, POWDER, LYOPHILIZED, FOR SOLUTION INTRAVENOUS at 19:55

## 2018-11-15 RX ADMIN — TAZOBACTAM SODIUM AND PIPERACILLIN SODIUM 3.38 G: 375; 3 INJECTION, SOLUTION INTRAVENOUS at 12:41

## 2018-11-15 RX ADMIN — SODIUM CHLORIDE: 9 INJECTION, SOLUTION INTRAVENOUS at 09:12

## 2018-11-15 RX ADMIN — Medication 10 ML: at 20:24

## 2018-11-15 RX ADMIN — POTASSIUM CHLORIDE 60 MEQ: 400 INJECTION, SOLUTION INTRAVENOUS at 17:43

## 2018-11-15 RX ADMIN — PHENYLEPHRINE HYDROCHLORIDE 100 MCG: 10 INJECTION INTRAVENOUS at 10:15

## 2018-11-15 RX ADMIN — PHENYLEPHRINE HYDROCHLORIDE 100 MCG: 10 INJECTION INTRAVENOUS at 10:20

## 2018-11-15 RX ADMIN — SODIUM CHLORIDE: 9 INJECTION, SOLUTION INTRAVENOUS at 04:37

## 2018-11-15 RX ADMIN — MAGNESIUM SULFATE HEPTAHYDRATE 1 G: 1 INJECTION, SOLUTION INTRAVENOUS at 17:44

## 2018-11-15 RX ADMIN — MAGNESIUM SULFATE HEPTAHYDRATE 1 G: 1 INJECTION, SOLUTION INTRAVENOUS at 20:36

## 2018-11-15 RX ADMIN — NOREPINEPHRINE BITARTRATE 10 MCG/MIN: 1 INJECTION INTRAVENOUS at 04:58

## 2018-11-15 ASSESSMENT — PULMONARY FUNCTION TESTS
PIF_VALUE: 0
PIF_VALUE: 16
PIF_VALUE: 13
PIF_VALUE: 17
PIF_VALUE: 3
PIF_VALUE: 20
PIF_VALUE: 0
PIF_VALUE: 16
PIF_VALUE: 17
PIF_VALUE: 0
PIF_VALUE: 17
PIF_VALUE: 19
PIF_VALUE: 0
PIF_VALUE: 19
PIF_VALUE: 0
PIF_VALUE: 1
PIF_VALUE: 1
PIF_VALUE: 16
PIF_VALUE: 1
PIF_VALUE: 18
PIF_VALUE: 3
PIF_VALUE: 0
PIF_VALUE: 1
PIF_VALUE: 0
PIF_VALUE: 17
PIF_VALUE: 16
PIF_VALUE: 50
PIF_VALUE: 18
PIF_VALUE: 1
PIF_VALUE: 18
PIF_VALUE: 17
PIF_VALUE: 3
PIF_VALUE: 17
PIF_VALUE: 15
PIF_VALUE: 16
PIF_VALUE: 20
PIF_VALUE: 3
PIF_VALUE: 17
PIF_VALUE: 19
PIF_VALUE: 17
PIF_VALUE: 12
PIF_VALUE: 18
PIF_VALUE: 16
PIF_VALUE: 17
PIF_VALUE: 19
PIF_VALUE: 77
PIF_VALUE: 0
PIF_VALUE: 16
PIF_VALUE: 3
PIF_VALUE: 18
PIF_VALUE: 12
PIF_VALUE: 12
PIF_VALUE: 17
PIF_VALUE: 18
PIF_VALUE: 17
PIF_VALUE: 2
PIF_VALUE: 0
PIF_VALUE: 16
PIF_VALUE: 17
PIF_VALUE: 3
PIF_VALUE: 17
PIF_VALUE: 18
PIF_VALUE: 18
PIF_VALUE: 0
PIF_VALUE: 3
PIF_VALUE: 17
PIF_VALUE: 17
PIF_VALUE: 16
PIF_VALUE: 12
PIF_VALUE: 1
PIF_VALUE: 12
PIF_VALUE: 17
PIF_VALUE: 18
PIF_VALUE: 17
PIF_VALUE: 1
PIF_VALUE: 2
PIF_VALUE: 12
PIF_VALUE: 20
PIF_VALUE: 12
PIF_VALUE: 17
PIF_VALUE: 18
PIF_VALUE: 1
PIF_VALUE: 19
PIF_VALUE: 1
PIF_VALUE: 12
PIF_VALUE: 0
PIF_VALUE: 16
PIF_VALUE: 20
PIF_VALUE: 18
PIF_VALUE: 1
PIF_VALUE: 0
PIF_VALUE: 17
PIF_VALUE: 1
PIF_VALUE: 0
PIF_VALUE: 17
PIF_VALUE: 20
PIF_VALUE: 18
PIF_VALUE: 1
PIF_VALUE: 12
PIF_VALUE: 20
PIF_VALUE: 19
PIF_VALUE: 22
PIF_VALUE: 12
PIF_VALUE: 17
PIF_VALUE: 0
PIF_VALUE: 16
PIF_VALUE: 17
PIF_VALUE: 12
PIF_VALUE: 17
PIF_VALUE: 20
PIF_VALUE: 17
PIF_VALUE: 16
PIF_VALUE: 3
PIF_VALUE: 0
PIF_VALUE: 1
PIF_VALUE: 0
PIF_VALUE: 0
PIF_VALUE: 18
PIF_VALUE: 13
PIF_VALUE: 20
PIF_VALUE: 1
PIF_VALUE: 1
PIF_VALUE: 16
PIF_VALUE: 18
PIF_VALUE: 20

## 2018-11-15 ASSESSMENT — PAIN SCALES - GENERAL
PAINLEVEL_OUTOF10: 0
PAINLEVEL_OUTOF10: 8
PAINLEVEL_OUTOF10: 6
PAINLEVEL_OUTOF10: 7
PAINLEVEL_OUTOF10: 8
PAINLEVEL_OUTOF10: 0
PAINLEVEL_OUTOF10: 7
PAINLEVEL_OUTOF10: 0

## 2018-11-15 ASSESSMENT — PAIN DESCRIPTION - ONSET: ONSET: ON-GOING

## 2018-11-15 ASSESSMENT — PAIN DESCRIPTION - DESCRIPTORS
DESCRIPTORS: ACHING;DISCOMFORT;DULL
DESCRIPTORS: CONSTANT;NAGGING;DISCOMFORT
DESCRIPTORS: ACHING;CONSTANT;DISCOMFORT

## 2018-11-15 ASSESSMENT — PAIN DESCRIPTION - LOCATION
LOCATION: ABDOMEN

## 2018-11-15 ASSESSMENT — PAIN DESCRIPTION - ORIENTATION
ORIENTATION: MID
ORIENTATION: MID;UPPER
ORIENTATION: MID

## 2018-11-15 ASSESSMENT — PAIN DESCRIPTION - FREQUENCY
FREQUENCY: CONTINUOUS
FREQUENCY: CONTINUOUS

## 2018-11-15 ASSESSMENT — PAIN DESCRIPTION - PROGRESSION
CLINICAL_PROGRESSION: NOT CHANGED
CLINICAL_PROGRESSION: NOT CHANGED

## 2018-11-15 ASSESSMENT — ENCOUNTER SYMPTOMS: SHORTNESS OF BREATH: 0

## 2018-11-15 ASSESSMENT — PAIN DESCRIPTION - DIRECTION
RADIATING_TOWARDS: RIGHT
RADIATING_TOWARDS: RIGHT

## 2018-11-15 ASSESSMENT — PAIN DESCRIPTION - PAIN TYPE
TYPE: ACUTE PAIN

## 2018-11-15 NOTE — CONSULTS
History:   Procedure Laterality Date    CHOLECYSTECTOMY      COLONOSCOPY      ENDOSCOPY, COLON, DIAGNOSTIC      ERCP  6-24-15    ballon extraction with stent insertion    EYE SURGERY         FAMILY Hx:  Family History   Problem Relation Age of Onset    Other Mother     Cancer Father        HOME MEDICATIONS:  Prior to Admission medications    Medication Sig Start Date End Date Taking? Authorizing Provider   ondansetron (ZOFRAN) 4 MG tablet Take 1 tablet by mouth every 8 hours as needed for Nausea or Vomiting 8/25/18   Maxi Kimball DO   omeprazole (PRILOSEC) 20 MG delayed release capsule Take 1 capsule by mouth every 12 hours for 14 days 8/25/18 9/8/18  [facility] Adriano Kimball,    clorazepate (TRANXENE) 7.5 MG tablet Take 7.5 mg by mouth 2 times daily    Historical Provider, MD       ALLERGIES:  Other    SOCIAL Hx:  Social History     Social History    Marital status:      Spouse name: N/A    Number of children: N/A    Years of education: N/A     Occupational History    Not on file. Social History Main Topics    Smoking status: Never Smoker    Smokeless tobacco: Never Used    Alcohol use No    Drug use: No    Sexual activity: Not Currently     Other Topics Concern    Not on file     Social History Narrative    No narrative on file       PE:  BP (!) 69/43   Pulse 107   Temp 97.3 °F (36.3 °C) (Oral)   Resp 27   Ht 4' 11\" (1.499 m)   Wt 105 lb 11.2 oz (47.9 kg)   SpO2 95%   BMI 21.35 kg/m²     General: A&Ox 3, friendly, NAD  HEENT: Atraumatic, symmetric, no anterior/posterior lymphadenopathy, moist mucous membranes, PERRL, EOM intact, fair dentition.    Pulm: CTAB, Neg w/r/r, normal chest expansion, no crackles noted  Cardio: RRR, neg m/r/g, nl S1 and S2, no extra heart sounds  Abd.: soft, NT, ND, BS+, no G/R, no HSM  Ext: +2/4 pulse Dp and radial b/l, LE and UE ROM intact,   Skin: No lesions, excoriations, petechiae, or ecchymoses noted    Neuro: normal sensation throughout, DTRs

## 2018-11-15 NOTE — CARE COORDINATION
Discharge Planning: Pt in [facility].  spoke via telephone with pt's daughter Shiloh Worrell, [phone], stated pt resides in alone in an apartment, no steps to enter. Stated pt was independent prior to admission and goes for walks daily. Stated no past SNF, HHC, or DME. Patient has prescription coverage, uses [employer], [place]. Pt plans to return home upon dc, daughter denies any needs including HHC stating she is able to assist pt if needed.   Electronically signed by JAMIE Willis on 11/15/2018 at 2:48 PM

## 2018-11-15 NOTE — CONSULTS
Pt seen and independently examined. D/w Dr. Christiano Olmstead. Agree with physical exam and A&P. Above has been discussed with the patient took questions answered to her satisfaction.   Thank you for the opportunity this patient in consultation      Katie Mckenna MD  11/15/2018  3:54 PM

## 2018-11-15 NOTE — H&P
11/14/2018    BILIDIR <0.2 10/14/2015    IBILI 0.1 10/14/2015    LABALBU 3.8 11/14/2018     Magnesium:  No results found for: MG  Phosphorus:  No results found for: PHOS  Warfarin PT/INR:  No components found for: PTPATWAR, PTINRWAR  PTT:    Lab Results   Component Value Date    APTT 31.4 10/14/2015   [APTT}  Last 3 Troponin:    Lab Results   Component Value Date    TROPONINI <0.01 11/14/2018    TROPONINI <0.01 08/25/2018     U/A:    Lab Results   Component Value Date    COLORU Yellow 11/14/2018    PROTEINU TRACE 11/14/2018    PHUR 6.0 11/14/2018    WBCUA 0-1 11/14/2018    RBCUA 2-5 11/14/2018    BACTERIA MODERATE 11/14/2018    CLARITYU Clear 11/14/2018    SPECGRAV 1.020 11/14/2018    LEUKOCYTESUR Negative 11/14/2018    UROBILINOGEN 1.0 11/14/2018    BILIRUBINUR Negative 11/14/2018    BLOODU SMALL 11/14/2018    GLUCOSEU Negative 11/14/2018    AMORPHOUS FEW 11/14/2018     HgBA1c:  No results found for: LABA1C  FLP:  No results found for: TRIG, HDL, LDLCALC, LDLDIRECT, LABVLDL  TSH:  No results found for: TSH  LIPASE:    Lab Results   Component Value Date    LIPASE 55 11/14/2018     Urine Toxicology:  No components found for: KRISH Gleason    Radiology: Ct Abdomen Pelvis W Iv Contrast Additional Contrast? None    Result Date: 11/15/2018  Initial report created on 11/15/2018 1:11:11 AM EST EXAM:  CT Abdomen and Pelvis With Intravenous Contrast EXAM DATE/TIME:  11/14/2018 8:00 PM CLINICAL HISTORY:  80years old, female; Pain and signs and symptoms; Fever; Abdominal pain; Epigastric;  Additional info: Abdominal pan elevated lfts TECHNIQUE:  Axial computed tomography images of the abdomen and pelvis with intravenous contrast.  All CT scans at this facility use at least one of these dose optimization techniques: automated exposure control; mA and/or kV adjustment per patient size (includes targeted exams where dose is matched to clinical indication); or iterative

## 2018-11-15 NOTE — PROCEDURES
Verónica Coleman is a 80 y.o. female patient. No diagnosis found. Past Medical History:   Diagnosis Date    Abdominal pain     Anxiety     PONV (postoperative nausea and vomiting)     Severe sepsis ([address] 11/15/2018    Sinus tachycardia 11/15/2018     Blood pressure (!) 79/50, pulse 108, temperature 98.3 °F (36.8 °C), temperature source Oral, resp. rate 26, height 4' 11\" (1.499 m), weight 105 lb 11.2 oz (47.9 kg), SpO2 96 %, not currently breastfeeding. Central Line  Date/Time: 11/15/2018 3:15 AM  Performed by: Loir Watson  Authorized by: Lori Watson   Consent: The procedure was performed in an emergent situation. Verbal consent obtained. Risks and benefits: risks, benefits and alternatives were discussed  Consent given by: patient  Patient understanding: patient states understanding of the procedure being performed  Patient consent: the patient's understanding of the procedure matches consent given  Procedure consent: procedure consent matches procedure scheduled  Required items: required blood products, implants, devices, and special equipment available  Patient identity confirmed: verbally with patient  Time out: Immediately prior to procedure a \"time out\" was called to verify the correct patient, procedure, equipment, support staff and site/side marked as required.   Indications: vascular access  Anesthesia: local infiltration    Anesthesia:  Local Anesthetic: lidocaine 1% without epinephrine  Anesthetic total: 3 mL    Sedation:  Patient sedated: no  Preparation: skin prepped with ChloraPrep  Skin prep agent dried: skin prep agent completely dried prior to procedure  Sterile barriers: all five maximum sterile barriers used - cap, mask, sterile gown, sterile gloves, and large sterile sheet  Hand hygiene: hand hygiene performed prior to central venous catheter insertion  Location details: right subclavian  Patient position: flat  Catheter type: triple lumen  Pre-procedure: landmarks identified  Ultrasound guidance: no  Number of attempts: 3  Successful placement: yes  Post-procedure: line sutured and dressing applied  Assessment: blood return through all ports,  placement verified by x-ray and no pneumothorax on x-ray (  Appears to be 2-3 cm deep into the IVC. We'll leave currently at the site)  Patient tolerance of procedure: Patient had some increased heart rate with fluids running.           Samson Fisher MD  11/15/2018

## 2018-11-15 NOTE — PROGRESS NOTES
Room #:   OR POOL/NONE  Date: 11/15/2018       Patient Name: Clifton Harkins  : 1935      MRN: [id number]     Patient unavailable for physical therapy eval due to off floor for procedure ERCP Endoscopic retrograde cholangiopancreatography  . Will attempt PT evaluation at a later time. Thank you.        Andrey Lugo, PT

## 2018-11-15 NOTE — ED NOTES
Called nurse to nurse to [facility] for pt to transfer to [address] [place] [address],[address] ICU. Mobile ICU/[facility] picked up pt at [phone]. Pt stable. No signs of distress noted prior to transfer.      Levi Guerrier RN  11/15/18 [id number]

## 2018-11-16 LAB
ALBUMIN SERPL-MCNC: 2.5 G/DL (ref 3.5–5.2)
ALP BLD-CCNC: 117 U/L (ref 35–104)
ALT SERPL-CCNC: 474 U/L (ref 0–32)
ANION GAP SERPL CALCULATED.3IONS-SCNC: 13 MMOL/L (ref 7–16)
AST SERPL-CCNC: 534 U/L (ref 0–31)
BASOPHILS ABSOLUTE: 0 E9/L (ref 0–0.2)
BASOPHILS RELATIVE PERCENT: 0 % (ref 0–2)
BILIRUB SERPL-MCNC: 2.1 MG/DL (ref 0–1.2)
BUN BLDV-MCNC: 19 MG/DL (ref 8–23)
BURR CELLS: ABNORMAL
CALCIUM SERPL-MCNC: 6.6 MG/DL (ref 8.6–10.2)
CHLORIDE BLD-SCNC: 117 MMOL/L (ref 98–107)
CO2: 14 MMOL/L (ref 22–29)
CREAT SERPL-MCNC: 1.3 MG/DL (ref 0.5–1)
EOSINOPHILS ABSOLUTE: 0 E9/L (ref 0.05–0.5)
EOSINOPHILS RELATIVE PERCENT: 0 % (ref 0–6)
GFR AFRICAN AMERICAN: 47
GFR NON-AFRICAN AMERICAN: 39 ML/MIN/1.73
GLUCOSE BLD-MCNC: 145 MG/DL (ref 74–99)
HCT VFR BLD CALC: 31.1 % (ref 34–48)
HEMOGLOBIN: 10.3 G/DL (ref 11.5–15.5)
LACTIC ACID: 1.8 MMOL/L (ref 0.5–2.2)
LACTIC ACID: 2.9 MMOL/L (ref 0.5–2.2)
LACTIC ACID: 2.9 MMOL/L (ref 0.5–2.2)
LACTIC ACID: 4.1 MMOL/L (ref 0.5–2.2)
LYMPHOCYTES ABSOLUTE: 0.7 E9/L (ref 1.5–4)
LYMPHOCYTES RELATIVE PERCENT: 2 % (ref 20–42)
MAGNESIUM: 3 MG/DL (ref 1.6–2.6)
MCH RBC QN AUTO: 28.9 PG (ref 26–35)
MCHC RBC AUTO-ENTMCNC: 33.1 % (ref 32–34.5)
MCV RBC AUTO: 87.4 FL (ref 80–99.9)
METAMYELOCYTES RELATIVE PERCENT: 1 % (ref 0–1)
MONOCYTES ABSOLUTE: 1.05 E9/L (ref 0.1–0.95)
MONOCYTES RELATIVE PERCENT: 3 % (ref 2–12)
MRSA CULTURE ONLY: NORMAL
NEUTROPHILS ABSOLUTE: 33.25 E9/L (ref 1.8–7.3)
NEUTROPHILS RELATIVE PERCENT: 94 % (ref 43–80)
PDW BLD-RTO: 14.3 FL (ref 11.5–15)
PHOSPHORUS: 3.6 MG/DL (ref 2.5–4.5)
PLATELET # BLD: 53 E9/L (ref 130–450)
PLATELET CONFIRMATION: NORMAL
PMV BLD AUTO: 11.6 FL (ref 7–12)
POIKILOCYTES: ABNORMAL
POTASSIUM SERPL-SCNC: 4.7 MMOL/L (ref 3.5–5)
RBC # BLD: 3.56 E12/L (ref 3.5–5.5)
SODIUM BLD-SCNC: 144 MMOL/L (ref 132–146)
TOTAL PROTEIN: 4.9 G/DL (ref 6.4–8.3)
WBC # BLD: 35 E9/L (ref 4.5–11.5)

## 2018-11-16 PROCEDURE — 97165 OT EVAL LOW COMPLEX 30 MIN: CPT

## 2018-11-16 PROCEDURE — 2580000003 HC RX 258: Performed by: INTERNAL MEDICINE

## 2018-11-16 PROCEDURE — 2580000003 HC RX 258: Performed by: HOSPITALIST

## 2018-11-16 PROCEDURE — 85025 COMPLETE CBC W/AUTO DIFF WBC: CPT

## 2018-11-16 PROCEDURE — 2700000000 HC OXYGEN THERAPY PER DAY

## 2018-11-16 PROCEDURE — C9113 INJ PANTOPRAZOLE SODIUM, VIA: HCPCS | Performed by: HOSPITALIST

## 2018-11-16 PROCEDURE — 36415 COLL VENOUS BLD VENIPUNCTURE: CPT

## 2018-11-16 PROCEDURE — 6360000002 HC RX W HCPCS: Performed by: INTERNAL MEDICINE

## 2018-11-16 PROCEDURE — G8979 MOBILITY GOAL STATUS: HCPCS | Performed by: PHYSICAL THERAPIST

## 2018-11-16 PROCEDURE — 97530 THERAPEUTIC ACTIVITIES: CPT | Performed by: PHYSICAL THERAPIST

## 2018-11-16 PROCEDURE — 83735 ASSAY OF MAGNESIUM: CPT

## 2018-11-16 PROCEDURE — 2580000003 HC RX 258: Performed by: NURSE PRACTITIONER

## 2018-11-16 PROCEDURE — 2000000000 HC ICU R&B

## 2018-11-16 PROCEDURE — 84100 ASSAY OF PHOSPHORUS: CPT

## 2018-11-16 PROCEDURE — 36592 COLLECT BLOOD FROM PICC: CPT

## 2018-11-16 PROCEDURE — 80053 COMPREHEN METABOLIC PANEL: CPT

## 2018-11-16 PROCEDURE — 2500000003 HC RX 250 WO HCPCS: Performed by: INTERNAL MEDICINE

## 2018-11-16 PROCEDURE — 99233 SBSQ HOSP IP/OBS HIGH 50: CPT | Performed by: INTERNAL MEDICINE

## 2018-11-16 PROCEDURE — 97161 PT EVAL LOW COMPLEX 20 MIN: CPT | Performed by: PHYSICAL THERAPIST

## 2018-11-16 PROCEDURE — 2500000003 HC RX 250 WO HCPCS: Performed by: HOSPITALIST

## 2018-11-16 PROCEDURE — 6360000002 HC RX W HCPCS: Performed by: HOSPITALIST

## 2018-11-16 PROCEDURE — 97530 THERAPEUTIC ACTIVITIES: CPT

## 2018-11-16 PROCEDURE — G8978 MOBILITY CURRENT STATUS: HCPCS | Performed by: PHYSICAL THERAPIST

## 2018-11-16 PROCEDURE — G8988 SELF CARE GOAL STATUS: HCPCS

## 2018-11-16 PROCEDURE — G8987 SELF CARE CURRENT STATUS: HCPCS

## 2018-11-16 PROCEDURE — 83605 ASSAY OF LACTIC ACID: CPT

## 2018-11-16 RX ORDER — 0.9 % SODIUM CHLORIDE 0.9 %
500 INTRAVENOUS SOLUTION INTRAVENOUS ONCE
Status: COMPLETED | OUTPATIENT
Start: 2018-11-16 | End: 2018-11-16

## 2018-11-16 RX ADMIN — HYDROCORTISONE SODIUM SUCCINATE 50 MG: 100 INJECTION, POWDER, FOR SOLUTION INTRAMUSCULAR; INTRAVENOUS at 01:49

## 2018-11-16 RX ADMIN — SODIUM CHLORIDE: 9 INJECTION, SOLUTION INTRAVENOUS at 01:00

## 2018-11-16 RX ADMIN — Medication 10 ML: at 21:31

## 2018-11-16 RX ADMIN — SODIUM CHLORIDE 500 ML: 9 INJECTION, SOLUTION INTRAVENOUS at 02:09

## 2018-11-16 RX ADMIN — TAZOBACTAM SODIUM AND PIPERACILLIN SODIUM 3.38 G: 375; 3 INJECTION, SOLUTION INTRAVENOUS at 18:23

## 2018-11-16 RX ADMIN — HYDROCORTISONE SODIUM SUCCINATE 50 MG: 100 INJECTION, POWDER, FOR SOLUTION INTRAMUSCULAR; INTRAVENOUS at 13:50

## 2018-11-16 RX ADMIN — NOREPINEPHRINE BITARTRATE 18 MCG/MIN: 1 INJECTION INTRAVENOUS at 00:59

## 2018-11-16 RX ADMIN — ASCORBIC ACID: 500 INJECTION, SOLUTION INTRAMUSCULAR; INTRAVENOUS; SUBCUTANEOUS at 01:50

## 2018-11-16 RX ADMIN — SODIUM CHLORIDE: 9 INJECTION, SOLUTION INTRAVENOUS at 13:55

## 2018-11-16 RX ADMIN — THIAMINE HYDROCHLORIDE 200 MG: 100 INJECTION, SOLUTION INTRAMUSCULAR; INTRAVENOUS at 21:31

## 2018-11-16 RX ADMIN — Medication 10 ML: at 08:49

## 2018-11-16 RX ADMIN — FENTANYL CITRATE 25 MCG: 50 INJECTION INTRAMUSCULAR; INTRAVENOUS at 01:49

## 2018-11-16 RX ADMIN — TAZOBACTAM SODIUM AND PIPERACILLIN SODIUM 3.38 G: 375; 3 INJECTION, SOLUTION INTRAVENOUS at 11:00

## 2018-11-16 RX ADMIN — SODIUM CHLORIDE: 9 INJECTION, SOLUTION INTRAVENOUS at 08:59

## 2018-11-16 RX ADMIN — TAZOBACTAM SODIUM AND PIPERACILLIN SODIUM 3.38 G: 375; 3 INJECTION, SOLUTION INTRAVENOUS at 03:44

## 2018-11-16 RX ADMIN — ASCORBIC ACID: 500 INJECTION, SOLUTION INTRAMUSCULAR; INTRAVENOUS; SUBCUTANEOUS at 08:49

## 2018-11-16 RX ADMIN — PANTOPRAZOLE SODIUM 40 MG: 40 INJECTION, POWDER, FOR SOLUTION INTRAVENOUS at 08:48

## 2018-11-16 RX ADMIN — THIAMINE HYDROCHLORIDE 200 MG: 100 INJECTION, SOLUTION INTRAMUSCULAR; INTRAVENOUS at 09:26

## 2018-11-16 RX ADMIN — ASCORBIC ACID: 500 INJECTION, SOLUTION INTRAMUSCULAR; INTRAVENOUS; SUBCUTANEOUS at 21:30

## 2018-11-16 RX ADMIN — Medication 10 ML: at 08:48

## 2018-11-16 RX ADMIN — ASCORBIC ACID: 500 INJECTION, SOLUTION INTRAMUSCULAR; INTRAVENOUS; SUBCUTANEOUS at 13:50

## 2018-11-16 RX ADMIN — SODIUM CHLORIDE: 9 INJECTION, SOLUTION INTRAVENOUS at 21:59

## 2018-11-16 RX ADMIN — HYDROCORTISONE SODIUM SUCCINATE 50 MG: 100 INJECTION, POWDER, FOR SOLUTION INTRAMUSCULAR; INTRAVENOUS at 21:30

## 2018-11-16 RX ADMIN — HYDROCORTISONE SODIUM SUCCINATE 50 MG: 100 INJECTION, POWDER, FOR SOLUTION INTRAMUSCULAR; INTRAVENOUS at 06:49

## 2018-11-16 RX ADMIN — ENOXAPARIN SODIUM 40 MG: 40 INJECTION SUBCUTANEOUS at 08:49

## 2018-11-16 ASSESSMENT — PAIN SCALES - GENERAL
PAINLEVEL_OUTOF10: 0
PAINLEVEL_OUTOF10: 6
PAINLEVEL_OUTOF10: 0
PAINLEVEL_OUTOF10: 0

## 2018-11-16 NOTE — PROGRESS NOTES
Occupational Therapy  Occupational Therapy Initial Assessment    Date:2018  Patient Name: Get Guadarrama  MRN: [id number]  : 1935  ROOM #: IC04/IC04-01    Placement Recommendation: Subacute    Equipment Prescriptions Needed: TBD at rehab    [facility]   AM-PAC Daily Activity Inpatient   How much help for putting on and taking off regular lower body clothing?: Total  How much help for Bathing?: A Lot  How much help for Toileting?: A Lot  How much help for putting on and taking off regular upper body clothing?: A Lot  How much help for taking care of personal grooming?: A Lot  How much help for eating meals?: None  AM-PAC Inpatient Daily Activity Raw Score: 13  AM-PAC Inpatient ADL T-Scale Score : 32.03  ADL Inpatient CMS 0-100% Score: 63.03  ADL Inpatient CMS G-Code Modifier : CL    Diagnosis:   1. Ascending cholangitis    2. Severe sepsis ([address]    3. Lactic acidosis    4. Pain      Past Medical History:   Past Medical History:   Diagnosis Date    Abdominal pain     Anxiety     PONV (postoperative nausea and vomiting)     Severe sepsis ([address] 11/15/2018    Sinus tachycardia 11/15/2018         The admitting diagnosis and active problem list as listed above have been reviewed prior to the initiation of this evaluation. Nursing cleared the patient for evaluation. Patient is agreeable to evaluation. Precautions: falls, art line  Pain Scale: Numeric Rate: no pain; Nursing notified. Social history: alone     Home architecture: Apartment, no steps. PLOF: independent with BADL and IADL, ambulated with no device, take daily walks    Equipment owned: none   Cognition: oriented x 4; follows 3 step directions.     good  Problem solving skills   good  Memory    good  Sequencing  Communication: intact   Visual perceptual skills: intact     Glasses: yes   Edema: no     Sensation: intact   Hand Dominance:  Left     X Right     Left Right Comment   Passive range of motion [facility]     Active range of motion

## 2018-11-16 NOTE — PROGRESS NOTES
Physical Therapy    IC04/IC04-01    PHYSICAL THERAPY INITIAL EVALUATION  Tentative placement recommendation: subacute  Equipment prescriptions needed:   none  Plan of care: Patient will be seen  daily for therapeutic exercise, functional retraining, endurance activities, balance exercises, family and patient education. AM-PAC Basic Mobility        AM-[facility] Mobility Inpatient   How much difficulty turning over in bed?: A Little  How much difficulty sitting down on / standing up from a chair with arms?: A Lot  How much difficulty moving from lying on back to sitting on side of bed?: A Lot  How much help from another person moving to and from a bed to a chair?: A Lot  How much help from another person needed to walk in hospital room?: A Lot  How much help from another person for climbing 3-5 steps with a railing?: Total  AM-PAC Inpatient Mobility Raw Score : 12  AM-PAC Inpatient T-Scale Score : 35.33  Mobility Inpatient CMS 0-100% Score: 68.66  Mobility Inpatient CMS G-Code Modifier : CL    Order: evaluate and treat  Diagnosis:    1. Ascending cholangitis    2. Severe sepsis ([address]    3. Lactic acidosis    4. Pain       Past medical history:       Diagnosis Date    Abdominal pain     Anxiety     PONV (postoperative nausea and vomiting)     Severe sepsis ([address] 11/15/2018    Sinus tachycardia 11/15/2018   ;      Procedure Laterality Date    CHOLECYSTECTOMY      COLONOSCOPY      ENDOSCOPY, COLON, DIAGNOSTIC      ERCP  6-24-15    ballon extraction with stent insertion    EYE SURGERY      [place] ERCP BILIARY/PANC DUCT STENT EXCHANGE W/DIL&WIRE N/A 11/15/2018    ERCP ENDOSCOPIC RETROGRADE CHOLANGIOPANCREATOGRAPHY 7a7 stent 15-18 balloon sweep stone removal performed by Yandy Miramontes MD at [address] [place] [address] admitting diagnosis and active problem list as listed above have been reviewed prior to the initiation of this evaluation. Nursing cleared the patient for evaluation.    Patient is agreeable to evaluation. Precautions: falls  , multiple lines, arterial line, new confusion  Social history: Patient lives alone  in a apartment with No steps to enter         Equipment owned: none,    Mentation: alert, cooperative, oriented x 3 and follows directions,     Prior level of function: indep  ROM: within functional limits   STRENGTH: 3/5  PAIN: (measured on a visual analog scale with 0=no pain and 10=excruciating pain) 0/10. FUNCTIONAL ASSESSMENT   Bed Mobility- Supine to sit-Moderate assistance       Scooting- Moderate assistance       Sit to supine- Moderate assistance     Patient able to dangle on edge of bed for 10 minutes with min assist   Transfers-sit to stand- Moderate assistance onto step up stool x 2 reps      Balance: sit-fair         stand fair       Edema: no  Endurance: poor      Treatment:evaluation;  Dangle; placed in chair position  Patient/family education:effects of immobilty      Rehab Potential: good  for baseline  Patients Goal: get stronger  ASSESSMENT  Patient exhibits decreased   strength, endurance,  balance, coordination impairing functional mobility. Goals to be met in 3 days. Bed mobility-Independent  Transfers-Independent  Ambulation-Independent for 50 feet using  wheeled walker       Increase strength in affected muscle groups by 1/3 grade  Increase balance to allow for improvement towards functional goals. Increase endurance to allow for improvement towards functional goals.

## 2018-11-17 ENCOUNTER — APPOINTMENT (OUTPATIENT)
Dept: GENERAL RADIOLOGY | Age: 83
DRG: 871 | End: 2018-11-17
Attending: HOSPITALIST
Payer: MEDICARE

## 2018-11-17 LAB
ALBUMIN SERPL-MCNC: 2.4 G/DL (ref 3.5–5.2)
ALP BLD-CCNC: 105 U/L (ref 35–104)
ALT SERPL-CCNC: 326 U/L (ref 0–32)
ANION GAP SERPL CALCULATED.3IONS-SCNC: 9 MMOL/L (ref 7–16)
AST SERPL-CCNC: 215 U/L (ref 0–31)
B.E.: -9.9 MMOL/L (ref -3–3)
BASOPHILS ABSOLUTE: 0 E9/L (ref 0–0.2)
BASOPHILS RELATIVE PERCENT: 0.5 % (ref 0–2)
BILIRUB SERPL-MCNC: 1.3 MG/DL (ref 0–1.2)
BUN BLDV-MCNC: 20 MG/DL (ref 8–23)
BURR CELLS: ABNORMAL
CALCIUM SERPL-MCNC: 7.5 MG/DL (ref 8.6–10.2)
CHLORIDE BLD-SCNC: 123 MMOL/L (ref 98–107)
CO2: 16 MMOL/L (ref 22–29)
COHB: 0.3 % (ref 0–1.5)
CREAT SERPL-MCNC: 1.2 MG/DL (ref 0.5–1)
CRITICAL: ABNORMAL
DATE ANALYZED: ABNORMAL
DATE OF COLLECTION: ABNORMAL
EOSINOPHILS ABSOLUTE: 0 E9/L (ref 0.05–0.5)
EOSINOPHILS RELATIVE PERCENT: 0 % (ref 0–6)
GFR AFRICAN AMERICAN: 52
GFR NON-AFRICAN AMERICAN: 43 ML/MIN/1.73
GLUCOSE BLD-MCNC: 133 MG/DL (ref 74–99)
HCO3: 16.3 MMOL/L (ref 22–26)
HCT VFR BLD CALC: 30.7 % (ref 34–48)
HEMOGLOBIN: 9.7 G/DL (ref 11.5–15.5)
HEMOGLOBIN: 9.8 G/DL (ref 11.5–15.5)
HHB: 3.6 % (ref 0–5)
LAB: ABNORMAL
LACTIC ACID: 1.2 MMOL/L (ref 0.5–2.2)
LACTIC ACID: 1.4 MMOL/L (ref 0.5–2.2)
LYMPHOCYTES ABSOLUTE: 1.42 E9/L (ref 1.5–4)
LYMPHOCYTES RELATIVE PERCENT: 3.5 % (ref 20–42)
Lab: ABNORMAL
MAGNESIUM: 2.7 MG/DL (ref 1.6–2.6)
MCH RBC QN AUTO: 28.3 PG (ref 26–35)
MCHC RBC AUTO-ENTMCNC: 31.9 % (ref 32–34.5)
MCV RBC AUTO: 88.7 FL (ref 80–99.9)
METHB: 0.2 % (ref 0–1.5)
MODE: ABNORMAL
MONOCYTES ABSOLUTE: 1.07 E9/L (ref 0.1–0.95)
MONOCYTES RELATIVE PERCENT: 2.6 % (ref 2–12)
NEUTROPHILS ABSOLUTE: 33.46 E9/L (ref 1.8–7.3)
NEUTROPHILS RELATIVE PERCENT: 93.9 % (ref 43–80)
O2 CONTENT: 15.1 ML/DL
O2 SATURATION: 96.4 % (ref 92–98.5)
O2HB: 95.9 % (ref 94–97)
OPERATOR ID: 901
OVALOCYTES: ABNORMAL
PATIENT TEMP: 37
PCO2: 37.1 MMHG (ref 35–45)
PDW BLD-RTO: 15 FL (ref 11.5–15)
PH BLOOD GAS: 7.26 (ref 7.35–7.45)
PHOSPHORUS: 2.8 MG/DL (ref 2.5–4.5)
PLATELET # BLD: 37 E9/L (ref 130–450)
PLATELET CONFIRMATION: NORMAL
PMV BLD AUTO: 13.1 FL (ref 7–12)
PO2: 95.1 MMHG (ref 60–100)
POIKILOCYTES: ABNORMAL
POTASSIUM SERPL-SCNC: 4.1 MMOL/L (ref 3.5–5)
RBC # BLD: 3.46 E12/L (ref 3.5–5.5)
SODIUM BLD-SCNC: 148 MMOL/L (ref 132–146)
SOURCE, BLOOD GAS: ABNORMAL
THB: 11.1 G/DL (ref 11.5–16.5)
TIME ANALYZED: 111
TOTAL PROTEIN: 5.1 G/DL (ref 6.4–8.3)
WBC # BLD: 35.6 E9/L (ref 4.5–11.5)

## 2018-11-17 PROCEDURE — 80053 COMPREHEN METABOLIC PANEL: CPT

## 2018-11-17 PROCEDURE — 2580000003 HC RX 258: Performed by: INTERNAL MEDICINE

## 2018-11-17 PROCEDURE — 2500000003 HC RX 250 WO HCPCS: Performed by: INTERNAL MEDICINE

## 2018-11-17 PROCEDURE — 84100 ASSAY OF PHOSPHORUS: CPT

## 2018-11-17 PROCEDURE — 36415 COLL VENOUS BLD VENIPUNCTURE: CPT

## 2018-11-17 PROCEDURE — 85018 HEMOGLOBIN: CPT

## 2018-11-17 PROCEDURE — 83735 ASSAY OF MAGNESIUM: CPT

## 2018-11-17 PROCEDURE — 6360000002 HC RX W HCPCS: Performed by: INTERNAL MEDICINE

## 2018-11-17 PROCEDURE — 83605 ASSAY OF LACTIC ACID: CPT

## 2018-11-17 PROCEDURE — 82805 BLOOD GASES W/O2 SATURATION: CPT

## 2018-11-17 PROCEDURE — 85025 COMPLETE CBC W/AUTO DIFF WBC: CPT

## 2018-11-17 PROCEDURE — 99233 SBSQ HOSP IP/OBS HIGH 50: CPT | Performed by: INTERNAL MEDICINE

## 2018-11-17 PROCEDURE — 2000000000 HC ICU R&B

## 2018-11-17 PROCEDURE — C9113 INJ PANTOPRAZOLE SODIUM, VIA: HCPCS | Performed by: HOSPITALIST

## 2018-11-17 PROCEDURE — 2580000003 HC RX 258: Performed by: HOSPITALIST

## 2018-11-17 PROCEDURE — 36592 COLLECT BLOOD FROM PICC: CPT

## 2018-11-17 PROCEDURE — 71045 X-RAY EXAM CHEST 1 VIEW: CPT

## 2018-11-17 PROCEDURE — 87040 BLOOD CULTURE FOR BACTERIA: CPT

## 2018-11-17 PROCEDURE — 6360000002 HC RX W HCPCS: Performed by: HOSPITALIST

## 2018-11-17 RX ORDER — FUROSEMIDE 10 MG/ML
40 INJECTION INTRAMUSCULAR; INTRAVENOUS 2 TIMES DAILY
Status: DISCONTINUED | OUTPATIENT
Start: 2018-11-17 | End: 2018-11-18

## 2018-11-17 RX ADMIN — TAZOBACTAM SODIUM AND PIPERACILLIN SODIUM 3.38 G: 375; 3 INJECTION, SOLUTION INTRAVENOUS at 11:15

## 2018-11-17 RX ADMIN — ASCORBIC ACID: 500 INJECTION, SOLUTION INTRAMUSCULAR; INTRAVENOUS; SUBCUTANEOUS at 14:14

## 2018-11-17 RX ADMIN — ASCORBIC ACID: 500 INJECTION, SOLUTION INTRAMUSCULAR; INTRAVENOUS; SUBCUTANEOUS at 01:58

## 2018-11-17 RX ADMIN — HYDROCORTISONE SODIUM SUCCINATE 50 MG: 100 INJECTION, POWDER, FOR SOLUTION INTRAMUSCULAR; INTRAVENOUS at 14:10

## 2018-11-17 RX ADMIN — TAZOBACTAM SODIUM AND PIPERACILLIN SODIUM 3.38 G: 375; 3 INJECTION, SOLUTION INTRAVENOUS at 03:22

## 2018-11-17 RX ADMIN — ENOXAPARIN SODIUM 40 MG: 40 INJECTION SUBCUTANEOUS at 09:15

## 2018-11-17 RX ADMIN — PANTOPRAZOLE SODIUM 40 MG: 40 INJECTION, POWDER, FOR SOLUTION INTRAVENOUS at 09:15

## 2018-11-17 RX ADMIN — HYDROCORTISONE SODIUM SUCCINATE 50 MG: 100 INJECTION, POWDER, FOR SOLUTION INTRAMUSCULAR; INTRAVENOUS at 01:45

## 2018-11-17 RX ADMIN — ASCORBIC ACID: 500 INJECTION, SOLUTION INTRAMUSCULAR; INTRAVENOUS; SUBCUTANEOUS at 21:25

## 2018-11-17 RX ADMIN — TAZOBACTAM SODIUM AND PIPERACILLIN SODIUM 3.38 G: 375; 3 INJECTION, SOLUTION INTRAVENOUS at 17:19

## 2018-11-17 RX ADMIN — FUROSEMIDE 40 MG: 10 INJECTION, SOLUTION INTRAMUSCULAR; INTRAVENOUS at 17:19

## 2018-11-17 RX ADMIN — Medication 10 ML: at 21:26

## 2018-11-17 RX ADMIN — HYDROCORTISONE SODIUM SUCCINATE 50 MG: 100 INJECTION, POWDER, FOR SOLUTION INTRAMUSCULAR; INTRAVENOUS at 06:26

## 2018-11-17 RX ADMIN — THIAMINE HYDROCHLORIDE 200 MG: 100 INJECTION, SOLUTION INTRAMUSCULAR; INTRAVENOUS at 09:17

## 2018-11-17 RX ADMIN — Medication 10 ML: at 09:15

## 2018-11-17 RX ADMIN — ASCORBIC ACID: 500 INJECTION, SOLUTION INTRAMUSCULAR; INTRAVENOUS; SUBCUTANEOUS at 09:10

## 2018-11-17 RX ADMIN — THIAMINE HYDROCHLORIDE 200 MG: 100 INJECTION, SOLUTION INTRAMUSCULAR; INTRAVENOUS at 21:41

## 2018-11-17 RX ADMIN — FUROSEMIDE 40 MG: 10 INJECTION, SOLUTION INTRAMUSCULAR; INTRAVENOUS at 06:55

## 2018-11-17 RX ADMIN — HYDROCORTISONE SODIUM SUCCINATE 50 MG: 100 INJECTION, POWDER, FOR SOLUTION INTRAMUSCULAR; INTRAVENOUS at 18:44

## 2018-11-17 ASSESSMENT — PAIN SCALES - GENERAL
PAINLEVEL_OUTOF10: 0

## 2018-11-17 NOTE — PROGRESS NOTES
stent placement and subsequent removal in 2015. Complicated with PEP.    - Continue Abx for minimum of 5-7 days, currently on zosyn day #3  - IVFs, monitor for pulmonary edema, saturations improved this morning  - Correction of electrolytes per admitting  - Supportive care, currently on pressors (levo 9mcg down from 30mcg)  - OK for general diet  - liver chemistries continue to improve, no signs of PEP  - Lactic acid trending downward, however worsening leukocytosis (steroid administration? C. Diff?) - ID on the case, currently on zosyn  - Stool studies have been ordered     Other medical issues managed on this admission  · Anxiety    Pt was seen, d/w, and examined with Dr. Pearl Gr DO  11/17/2018  8:25 AM    I was present at bedside and performed my own exam.  I agree with the above findings and plan. Pt appearing better, no significant abd pain. Hepatic profile appears to be improving. Pt with significant leukocytosis and metabolic acidosis. ID consulted. Will repeat labs and check C diff. Our service will follow.     DO Arash  11/17/2018  11:00 PM

## 2018-11-17 NOTE — PROGRESS NOTES
Notified Dr. Stefanie Ortiz of patient appearing more labored. Blood gas results called and notified xray was available for view. No new orders at this time.

## 2018-11-17 NOTE — CONSULTS
plts dropping LFT better    CURRENT MEDICATIONS     Current Facility-Administered Medications:     furosemide (LASIX) injection 40 mg, 40 mg, Intravenous, BID, Tika Turner MD, 40 mg at 11/17/18 0655    enoxaparin (LOVENOX) injection 40 mg, 40 mg, Subcutaneous, Daily, Ashanti Winkler MD, 40 mg at 11/17/18 0915    magnesium hydroxide (MILK OF MAGNESIA) 400 MG/5ML suspension 30 mL, 30 mL, Oral, Daily PRN, Ashanti Winkler MD    ondansetron [facility]) injection 4 mg, 4 mg, Intravenous, Q6H PRN, Ashanti Winkler MD    piperacillin-tazobactam (ZOSYN) 3.375 g in dextrose 50 mL IVPB extended infusion (premix), 3.375 g, Intravenous, Q8H, Ashanti Winkler MD, Stopped at 11/17/18 4734    sodium chloride flush 0.9 % injection 10 mL, 10 mL, Intravenous, 2 times per day, Sincere Oconnor MD, 10 mL at 11/16/18 2131    sodium chloride flush 0.9 % injection 10 mL, 10 mL, Intravenous, PRN, Sincere Oconnor MD    norepinephrine (LEVOPHED) 16 mg in dextrose 5 % 250 mL infusion, 10 mcg/min, Intravenous, Continuous, Sincere Oconnor MD, Stopped at 11/16/18 1300    promethazine (PHENERGAN) injection 12.5 mg, 12.5 mg, Intravenous, Q4H PRN, Sincere Oconnor MD    fentaNYL (SUBLIMAZE) injection 25 mcg, 25 mcg, Intravenous, Q2H PRN, Sincere Oconnor MD, 25 mcg at 11/16/18 0149    pantoprazole (PROTONIX) injection 40 mg, 40 mg, Intravenous, Daily, Sincere Oconnor MD, 40 mg at 11/17/18 0915    sodium chloride (PF) 0.9 % injection 10 mL, 10 mL, Intravenous, 2 times per day, Kevan Blood, DO, 10 mL at 11/17/18 0915    sodium chloride (PF) 0.9 % injection 10 mL, 10 mL, Intravenous, PRN, Kevan Blood, DO    potassium chloride 20 mEq/50 mL IVPB (Central Line), 20 mEq, Intravenous, PRN, Olga Lidia Roldan MD    magnesium sulfate 1 g in dextrose 5% 100 mL IVPB, 1 g, Intravenous, PRN, Olga Lidia Roldan MD, Stopped at 11/15/18 2136    [COMPLETED] magnesium sulfate 2 g in 50 mL IVPB premix, 2 g, Intravenous, Once, Stopped at 11/15/18 2025 **FOLLOWED BY** magnesium sulfate 2 g in 50 mL IVPB premix, 2 g, Intravenous, Once, Elias Omalley MD    phenylephrine (MATEO-SYNEPHRINE) 50 mg in dextrose 5 % 250 mL infusion, 100 mcg/min, Intravenous, Continuous, Catherine Browning MD    ascorbic acid 1,500 mg in sodium chloride 0.9 % 100 mL IVPB, , Intravenous, Q6H, Catherine Browning MD, Last Rate: 0 mL/hr at 11/17/18 0323    thiamine (B-1) 200 mg in sodium chloride 0.9 % 50 mL IVPB, 200 mg, Intravenous, Q12H, Catherine Browning MD, Last Rate: 100 mL/hr at 11/17/18 0917, 200 mg at 11/17/18 0917    hydrocortisone sodium succinate PF (SOLU-CORTEF) injection 50 mg, 50 mg, Intravenous, Q6H, Catherine Browning MD, 50 mg at 11/17/18 9313  ALLERGIES     Other    There is no immunization history on file for this patient.   REVIEW OF SYSTEMS    (2-9 systems for level 4, 10 or more for level 5)     REVIEW OFSYSTEMS:    CONSTITUTIONAL:  No fever, chills, weight loss  ALLERGIES:   No urticaria, hay fever,    EYES:   No blurry vision, loss of vision,eye pain  ENT:     No hearing loss, sore throat  CARDIOVASCULAR:  No chest pain or palpitations  RESPIRATORY:  No cough, sob  ENDOCRINE:   No diabetes, thyroid disease  HEME-LYMPH:  No easy bruising or bleeding  GI:    No nausea, vomiting or diarrhea  :    No urinary complaints  NEURO:   No seizures, stroke, HA  MUSCULOSKELETAL: No muscle aches or pain, no jointpain  SKIN:    No rash or itch  PSYCH:   No depression or anxiety    PAST MEDICAL HISTORY     Past Medical History:   Diagnosis Date    Abdominal pain     Anxiety     PONV (postoperative nausea and vomiting)     Severe sepsis ([address] 11/15/2018    Sinus tachycardia 11/15/2018     SURGICAL HISTORY       Past Surgical History:   Procedure Laterality Date    CHOLECYSTECTOMY      COLONOSCOPY      ENDOSCOPY, COLON, DIAGNOSTIC      ERCP  6-24-15    ballon extraction with stent insertion    EYE SURGERY      RI ERCP BILIARY/PANC DUCT STENT EXCHANGE W/DIL&WIRE N/A 11/15/2018    ERCP ENDOSCOPIC RETROGRADE of the stomach suggestive of a nonspecific gastritis, versus artifact related to underdistention. Correlate clinically. 2. Intrahepatic and common bile duct dilation with the common duct up to 16 mm with associated pneumobilia. Intrahepatic ductal dilatation worse than previous exam. 3. Wall thickening with surrounding inflammatory change throughout the colon consistent with a nonspecific colitis, worse at the distal colon. COMMENT: Consistent with the [employer]'s Incidental Findings Committee Report (J Am Anders Radiol 2010): Unless the patient's specific circumstances suggest otherwise, any liver lesion 0.5 cm or less, any cystic kidney lesion less than 1.0 cm, and/or any adrenal lesion 1.0 cm or less not otherwise characterized in this report as possessing suspicious or indeterminate imaging features is/are highly likely to be benign and do not require follow-up imaging or biopsy. This report has been electronically signed by Tatiana Gallego MD.    Fl Ercp Biliary And Pancreatic S&i    Result Date: 11/15/2018  Reading Location: 200 Indication: Cholelithiasis. Comparison: ERCP from 6/24/2015. Hue Smith Technique: Intraoperative fluoroscopy was provided. Total fluoroscopy time was 672.5 seconds (DAP of 126 mGy). 9 fluoroscopic spot images were provided. Provided fluoroscopic spot images demonstrate dilatation of the common bile duct with multiple stones. There is subsequent placement of a wire and biliary stent. Xr Chest Portable    Result Date: 11/17/2018  LOCATION: 200 EXAM: XR CHEST PORTABLE COMPARISON: November 15, 2018 HISTORY: Shortness of breath TECHNIQUE: Single frontal view of the chest was obtained. FINDINGS:  SUPPORT DEVICES: None. LUNGS: Clear with no areas of consolidation. PLEURA: Enlarging bilateral effusions identified with perihilar vascular indistinctness. LUNG VOLUMES: Satisfactory inspirator effort. MEDIASTINAL STRUCTURES: No lymphadenopathy. Normal aortic contour.  HEART SIZE: HCO3 16.3 (L) 22.0 - 26.0 mmol/L    B.E. -9.9 (L) -3.0 - 3.0 mmol/L    O2 Sat 96.4 92.0 - 98.5 %    O2Hb 95.9 94.0 - 97.0 %    COHb 0.3 0.0 - 1.5 %    MetHb 0.2 0.0 - 1.5 %    O2 Content 15.1 mL/dL    HHb 3.6 0.0 - 5.0 %    tHb (est) 11.1 (L) 11.5 - 16.5 g/dL    Mode NC- 4 L     Date Of Collection      Time Collected      Pt Temp 37      ID [id number]     Lab [id number]     Critical(s) Notified . No Critical Values    Platelet Confirmation   Result Value Ref Range    Platelet Confirmation CONFIRMED      MICROBIOLOGY:     Culture, Blood 2   Date Value Ref Range Status   11/15/2018 (A)  Preliminary    24 Hours- no growth  Gram stain performed from blood culture bottle media  Gram negative rods     06/25/2015 5 Days- no growth  Final        MRSA Culture Only   Date Value Ref Range Status   11/15/2018 Methicillin resistant Staph aureus not isolated  Final       Patient is a 80 y.o. female who presentedwith cholangitis with gn sepsis     FINAL IMPRESSION    Gn sepsis  1. Ascending cholangitis    2. Severe sepsis ([address] -leukocytosis/thrombocytopenia    3. Lactic acidosis      -repeat blood cx  -cont piptazo for now  -can stop vanco  If wbc cont to increase consider changing tlc, dvt work up  No change rx at this time        Thank you for the consult. We will follow with you.        Electronically signed by Linda Landaverde MD on 11/17/2018 at 10:30 AM

## 2018-11-18 ENCOUNTER — APPOINTMENT (OUTPATIENT)
Dept: CT IMAGING | Age: 83
DRG: 871 | End: 2018-11-18
Attending: HOSPITALIST
Payer: MEDICARE

## 2018-11-18 LAB
ALBUMIN SERPL-MCNC: 2.4 G/DL (ref 3.5–5.2)
ALBUMIN SERPL-MCNC: 2.5 G/DL (ref 3.5–5.2)
ALP BLD-CCNC: 119 U/L (ref 35–104)
ALP BLD-CCNC: 126 U/L (ref 35–104)
ALT SERPL-CCNC: 181 U/L (ref 0–32)
ALT SERPL-CCNC: 218 U/L (ref 0–32)
ANION GAP SERPL CALCULATED.3IONS-SCNC: 11 MMOL/L (ref 7–16)
ANION GAP SERPL CALCULATED.3IONS-SCNC: 12 MMOL/L (ref 7–16)
AST SERPL-CCNC: 53 U/L (ref 0–31)
AST SERPL-CCNC: 78 U/L (ref 0–31)
BASOPHILS ABSOLUTE: 0 E9/L (ref 0–0.2)
BASOPHILS RELATIVE PERCENT: 0 % (ref 0–2)
BILIRUB SERPL-MCNC: 1 MG/DL (ref 0–1.2)
BILIRUB SERPL-MCNC: 1.2 MG/DL (ref 0–1.2)
BUN BLDV-MCNC: 24 MG/DL (ref 8–23)
BUN BLDV-MCNC: 24 MG/DL (ref 8–23)
CALCIUM SERPL-MCNC: 7.5 MG/DL (ref 8.6–10.2)
CALCIUM SERPL-MCNC: 7.7 MG/DL (ref 8.6–10.2)
CHLORIDE BLD-SCNC: 111 MMOL/L (ref 98–107)
CHLORIDE BLD-SCNC: 112 MMOL/L (ref 98–107)
CO2: 24 MMOL/L (ref 22–29)
CO2: 25 MMOL/L (ref 22–29)
CREAT SERPL-MCNC: 1.3 MG/DL (ref 0.5–1)
CREAT SERPL-MCNC: 1.3 MG/DL (ref 0.5–1)
EOSINOPHILS ABSOLUTE: 0 E9/L (ref 0.05–0.5)
EOSINOPHILS RELATIVE PERCENT: 0 % (ref 0–6)
GFR AFRICAN AMERICAN: 47
GFR AFRICAN AMERICAN: 47
GFR NON-AFRICAN AMERICAN: 39 ML/MIN/1.73
GFR NON-AFRICAN AMERICAN: 39 ML/MIN/1.73
GLUCOSE BLD-MCNC: 134 MG/DL (ref 74–99)
GLUCOSE BLD-MCNC: 176 MG/DL (ref 74–99)
HCT VFR BLD CALC: 28 % (ref 34–48)
HEMOGLOBIN: 10.2 G/DL (ref 11.5–15.5)
HEMOGLOBIN: 9.6 G/DL (ref 11.5–15.5)
HEMOGLOBIN: 9.6 G/DL (ref 11.5–15.5)
HEMOGLOBIN: 9.8 G/DL (ref 11.5–15.5)
IMMATURE RETIC FRACT: 12.8 % (ref 3–15.9)
LACTIC ACID: 2.2 MMOL/L (ref 0.5–2.2)
LYMPHOCYTES ABSOLUTE: 1.36 E9/L (ref 1.5–4)
LYMPHOCYTES RELATIVE PERCENT: 2.6 % (ref 20–42)
MAGNESIUM: 1.8 MG/DL (ref 1.6–2.6)
MCH RBC QN AUTO: 29.1 PG (ref 26–35)
MCHC RBC AUTO-ENTMCNC: 34.3 % (ref 32–34.5)
MCV RBC AUTO: 84.8 FL (ref 80–99.9)
MONOCYTES ABSOLUTE: 1.81 E9/L (ref 0.1–0.95)
MONOCYTES RELATIVE PERCENT: 4.3 % (ref 2–12)
NEUTROPHILS ABSOLUTE: 42.04 E9/L (ref 1.8–7.3)
NEUTROPHILS RELATIVE PERCENT: 93.1 % (ref 43–80)
PDW BLD-RTO: 15 FL (ref 11.5–15)
PHOSPHORUS: 2 MG/DL (ref 2.5–4.5)
PLATELET # BLD: 37 E9/L (ref 130–450)
PLATELET CONFIRMATION: NORMAL
PMV BLD AUTO: 11.9 FL (ref 7–12)
POTASSIUM SERPL-SCNC: 2.8 MMOL/L (ref 3.5–5)
POTASSIUM SERPL-SCNC: 3.3 MMOL/L (ref 3.5–5)
RBC # BLD: 3.3 E12/L (ref 3.5–5.5)
RBC # BLD: NORMAL 10*6/UL
RETIC HGB EQUIVALENT: 27.1 PG (ref 28.2–36.6)
RETICULOCYTE ABSOLUTE COUNT: 0.03 E12/L
RETICULOCYTE COUNT PCT: 1 % (ref 0.4–1.9)
SODIUM BLD-SCNC: 146 MMOL/L (ref 132–146)
SODIUM BLD-SCNC: 149 MMOL/L (ref 132–146)
TOTAL PROTEIN: 4.9 G/DL (ref 6.4–8.3)
TOTAL PROTEIN: 5 G/DL (ref 6.4–8.3)
WBC # BLD: 45.2 E9/L (ref 4.5–11.5)

## 2018-11-18 PROCEDURE — 6360000002 HC RX W HCPCS: Performed by: HOSPITALIST

## 2018-11-18 PROCEDURE — 87329 GIARDIA AG IA: CPT

## 2018-11-18 PROCEDURE — 85045 AUTOMATED RETICULOCYTE COUNT: CPT

## 2018-11-18 PROCEDURE — 2580000003 HC RX 258: Performed by: HOSPITALIST

## 2018-11-18 PROCEDURE — 85025 COMPLETE CBC W/AUTO DIFF WBC: CPT

## 2018-11-18 PROCEDURE — 85018 HEMOGLOBIN: CPT

## 2018-11-18 PROCEDURE — 2580000003 HC RX 258: Performed by: INTERNAL MEDICINE

## 2018-11-18 PROCEDURE — 2500000003 HC RX 250 WO HCPCS: Performed by: INTERNAL MEDICINE

## 2018-11-18 PROCEDURE — C9113 INJ PANTOPRAZOLE SODIUM, VIA: HCPCS | Performed by: HOSPITALIST

## 2018-11-18 PROCEDURE — 6360000002 HC RX W HCPCS: Performed by: INTERNAL MEDICINE

## 2018-11-18 PROCEDURE — 1200000000 HC SEMI PRIVATE

## 2018-11-18 PROCEDURE — 83605 ASSAY OF LACTIC ACID: CPT

## 2018-11-18 PROCEDURE — 2700000000 HC OXYGEN THERAPY PER DAY

## 2018-11-18 PROCEDURE — 83735 ASSAY OF MAGNESIUM: CPT

## 2018-11-18 PROCEDURE — 71250 CT THORAX DX C-: CPT

## 2018-11-18 PROCEDURE — 36415 COLL VENOUS BLD VENIPUNCTURE: CPT

## 2018-11-18 PROCEDURE — 87045 FECES CULTURE AEROBIC BACT: CPT

## 2018-11-18 PROCEDURE — 87425 ROTAVIRUS AG IA: CPT

## 2018-11-18 PROCEDURE — 89055 LEUKOCYTE ASSESSMENT FECAL: CPT

## 2018-11-18 PROCEDURE — 84100 ASSAY OF PHOSPHORUS: CPT

## 2018-11-18 PROCEDURE — 80053 COMPREHEN METABOLIC PANEL: CPT

## 2018-11-18 PROCEDURE — 6360000004 HC RX CONTRAST MEDICATION: Performed by: RADIOLOGY

## 2018-11-18 PROCEDURE — 99233 SBSQ HOSP IP/OBS HIGH 50: CPT | Performed by: INTERNAL MEDICINE

## 2018-11-18 PROCEDURE — 36592 COLLECT BLOOD FROM PICC: CPT | Performed by: NURSE PRACTITIONER

## 2018-11-18 PROCEDURE — 74176 CT ABD & PELVIS W/O CONTRAST: CPT

## 2018-11-18 RX ORDER — FLUCONAZOLE 2 MG/ML
400 INJECTION, SOLUTION INTRAVENOUS ONCE
Status: COMPLETED | OUTPATIENT
Start: 2018-11-18 | End: 2018-11-19

## 2018-11-18 RX ADMIN — TAZOBACTAM SODIUM AND PIPERACILLIN SODIUM 3.38 G: 375; 3 INJECTION, SOLUTION INTRAVENOUS at 08:53

## 2018-11-18 RX ADMIN — ASCORBIC ACID: 500 INJECTION, SOLUTION INTRAMUSCULAR; INTRAVENOUS; SUBCUTANEOUS at 10:15

## 2018-11-18 RX ADMIN — THIAMINE HYDROCHLORIDE 200 MG: 100 INJECTION, SOLUTION INTRAMUSCULAR; INTRAVENOUS at 08:52

## 2018-11-18 RX ADMIN — ASCORBIC ACID: 500 INJECTION, SOLUTION INTRAMUSCULAR; INTRAVENOUS; SUBCUTANEOUS at 15:14

## 2018-11-18 RX ADMIN — Medication 10 ML: at 21:15

## 2018-11-18 RX ADMIN — HYDROCORTISONE SODIUM SUCCINATE 50 MG: 100 INJECTION, POWDER, FOR SOLUTION INTRAMUSCULAR; INTRAVENOUS at 08:48

## 2018-11-18 RX ADMIN — POTASSIUM CHLORIDE 20 MEQ: 29.8 INJECTION, SOLUTION INTRAVENOUS at 17:21

## 2018-11-18 RX ADMIN — ASCORBIC ACID: 500 INJECTION, SOLUTION INTRAMUSCULAR; INTRAVENOUS; SUBCUTANEOUS at 01:45

## 2018-11-18 RX ADMIN — PANTOPRAZOLE SODIUM 40 MG: 40 INJECTION, POWDER, FOR SOLUTION INTRAVENOUS at 08:53

## 2018-11-18 RX ADMIN — HYDROCORTISONE SODIUM SUCCINATE 50 MG: 100 INJECTION, POWDER, FOR SOLUTION INTRAMUSCULAR; INTRAVENOUS at 01:13

## 2018-11-18 RX ADMIN — POTASSIUM CHLORIDE 20 MEQ: 29.8 INJECTION, SOLUTION INTRAVENOUS at 09:48

## 2018-11-18 RX ADMIN — TAZOBACTAM SODIUM AND PIPERACILLIN SODIUM 3.38 G: 375; 3 INJECTION, SOLUTION INTRAVENOUS at 18:25

## 2018-11-18 RX ADMIN — POTASSIUM CHLORIDE 20 MEQ: 29.8 INJECTION, SOLUTION INTRAVENOUS at 16:18

## 2018-11-18 RX ADMIN — Medication 10 ML: at 21:16

## 2018-11-18 RX ADMIN — TAZOBACTAM SODIUM AND PIPERACILLIN SODIUM 3.38 G: 375; 3 INJECTION, SOLUTION INTRAVENOUS at 01:45

## 2018-11-18 RX ADMIN — HYDROCORTISONE SODIUM SUCCINATE 50 MG: 100 INJECTION, POWDER, FOR SOLUTION INTRAMUSCULAR; INTRAVENOUS at 15:17

## 2018-11-18 RX ADMIN — HYDROCORTISONE SODIUM SUCCINATE 50 MG: 100 INJECTION, POWDER, FOR SOLUTION INTRAMUSCULAR; INTRAVENOUS at 18:25

## 2018-11-18 RX ADMIN — IOHEXOL 50 ML: 240 INJECTION, SOLUTION INTRATHECAL; INTRAVASCULAR; INTRAVENOUS; ORAL at 11:54

## 2018-11-18 RX ADMIN — Medication 10 ML: at 08:51

## 2018-11-18 RX ADMIN — ASCORBIC ACID: 500 INJECTION, SOLUTION INTRAMUSCULAR; INTRAVENOUS; SUBCUTANEOUS at 21:08

## 2018-11-18 RX ADMIN — POTASSIUM CHLORIDE 20 MEQ: 29.8 INJECTION, SOLUTION INTRAVENOUS at 09:06

## 2018-11-18 RX ADMIN — THIAMINE HYDROCHLORIDE 200 MG: 100 INJECTION, SOLUTION INTRAMUSCULAR; INTRAVENOUS at 21:08

## 2018-11-18 RX ADMIN — POTASSIUM CHLORIDE 20 MEQ: 29.8 INJECTION, SOLUTION INTRAVENOUS at 10:49

## 2018-11-18 ASSESSMENT — PAIN SCALES - GENERAL
PAINLEVEL_OUTOF10: 0

## 2018-11-19 LAB
ALBUMIN SERPL-MCNC: 2.7 G/DL (ref 3.5–5.2)
ALP BLD-CCNC: 323 U/L (ref 35–104)
ALT SERPL-CCNC: 156 U/L (ref 0–32)
ANION GAP SERPL CALCULATED.3IONS-SCNC: 10 MMOL/L (ref 7–16)
ANION GAP SERPL CALCULATED.3IONS-SCNC: 11 MMOL/L (ref 7–16)
APTT: 25.6 SEC (ref 24.5–35.1)
AST SERPL-CCNC: 40 U/L (ref 0–31)
BASOPHILS ABSOLUTE: 0 E9/L (ref 0–0.2)
BASOPHILS RELATIVE PERCENT: 0 % (ref 0–2)
BILIRUB SERPL-MCNC: 1 MG/DL (ref 0–1.2)
BUN BLDV-MCNC: 23 MG/DL (ref 8–23)
BUN BLDV-MCNC: 23 MG/DL (ref 8–23)
CALCIUM SERPL-MCNC: 8 MG/DL (ref 8.6–10.2)
CALCIUM SERPL-MCNC: 8.1 MG/DL (ref 8.6–10.2)
CHLORIDE BLD-SCNC: 111 MMOL/L (ref 98–107)
CHLORIDE BLD-SCNC: 114 MMOL/L (ref 98–107)
CO2: 26 MMOL/L (ref 22–29)
CO2: 26 MMOL/L (ref 22–29)
CREAT SERPL-MCNC: 1.1 MG/DL (ref 0.5–1)
CREAT SERPL-MCNC: 1.1 MG/DL (ref 0.5–1)
DOHLE BODIES: ABNORMAL
EOSINOPHILS ABSOLUTE: 0 E9/L (ref 0.05–0.5)
EOSINOPHILS RELATIVE PERCENT: 0 % (ref 0–6)
GFR AFRICAN AMERICAN: 57
GFR AFRICAN AMERICAN: 57
GFR NON-AFRICAN AMERICAN: 47 ML/MIN/1.73
GFR NON-AFRICAN AMERICAN: 47 ML/MIN/1.73
GIARDIA ANTIGEN STOOL: NORMAL
GLUCOSE BLD-MCNC: 144 MG/DL (ref 74–99)
GLUCOSE BLD-MCNC: 156 MG/DL (ref 74–99)
HCT VFR BLD CALC: 29 % (ref 34–48)
HCT VFR BLD CALC: 30.2 % (ref 34–48)
HEMOGLOBIN: 10.1 G/DL (ref 11.5–15.5)
HEMOGLOBIN: 9.6 G/DL (ref 11.5–15.5)
INR BLD: 1.1
LYMPHOCYTES ABSOLUTE: 1.06 E9/L (ref 1.5–4)
LYMPHOCYTES RELATIVE PERCENT: 2 % (ref 20–42)
MAGNESIUM: 2 MG/DL (ref 1.6–2.6)
MCH RBC QN AUTO: 28.4 PG (ref 26–35)
MCH RBC QN AUTO: 28.9 PG (ref 26–35)
MCHC RBC AUTO-ENTMCNC: 33.1 % (ref 32–34.5)
MCHC RBC AUTO-ENTMCNC: 33.4 % (ref 32–34.5)
MCV RBC AUTO: 85.8 FL (ref 80–99.9)
MCV RBC AUTO: 86.3 FL (ref 80–99.9)
MONOCYTES ABSOLUTE: 1.06 E9/L (ref 0.1–0.95)
MONOCYTES RELATIVE PERCENT: 2 % (ref 2–12)
NEUTROPHILS ABSOLUTE: 50.69 E9/L (ref 1.8–7.3)
NEUTROPHILS RELATIVE PERCENT: 96 % (ref 43–80)
PDW BLD-RTO: 15.2 FL (ref 11.5–15)
PDW BLD-RTO: 15.3 FL (ref 11.5–15)
PHOSPHORUS: 1.6 MG/DL (ref 2.5–4.5)
PLATELET # BLD: 46 E9/L (ref 130–450)
PLATELET # BLD: 59 E9/L (ref 130–450)
PLATELET CONFIRMATION: NORMAL
PLATELET CONFIRMATION: NORMAL
PMV BLD AUTO: 12.1 FL (ref 7–12)
PMV BLD AUTO: 12.3 FL (ref 7–12)
POTASSIUM SERPL-SCNC: 3.3 MMOL/L (ref 3.5–5)
POTASSIUM SERPL-SCNC: 3.8 MMOL/L (ref 3.5–5)
PROTHROMBIN TIME: 12.7 SEC (ref 9.3–12.4)
RBC # BLD: 3.38 E12/L (ref 3.5–5.5)
RBC # BLD: 3.5 E12/L (ref 3.5–5.5)
ROTAVIRUS ANTIGEN: NORMAL
SODIUM BLD-SCNC: 148 MMOL/L (ref 132–146)
SODIUM BLD-SCNC: 150 MMOL/L (ref 132–146)
TOTAL PROTEIN: 5.2 G/DL (ref 6.4–8.3)
TOXIC GRANULATION: ABNORMAL
WBC # BLD: 52.8 E9/L (ref 4.5–11.5)
WBC # BLD: 53.8 E9/L (ref 4.5–11.5)

## 2018-11-19 PROCEDURE — 6360000002 HC RX W HCPCS: Performed by: INTERNAL MEDICINE

## 2018-11-19 PROCEDURE — 36592 COLLECT BLOOD FROM PICC: CPT

## 2018-11-19 PROCEDURE — 2700000000 HC OXYGEN THERAPY PER DAY

## 2018-11-19 PROCEDURE — 2500000003 HC RX 250 WO HCPCS: Performed by: INTERNAL MEDICINE

## 2018-11-19 PROCEDURE — 80053 COMPREHEN METABOLIC PANEL: CPT

## 2018-11-19 PROCEDURE — 80048 BASIC METABOLIC PNL TOTAL CA: CPT

## 2018-11-19 PROCEDURE — 6360000002 HC RX W HCPCS: Performed by: SPECIALIST

## 2018-11-19 PROCEDURE — 85025 COMPLETE CBC W/AUTO DIFF WBC: CPT

## 2018-11-19 PROCEDURE — 97116 GAIT TRAINING THERAPY: CPT

## 2018-11-19 PROCEDURE — 36415 COLL VENOUS BLD VENIPUNCTURE: CPT

## 2018-11-19 PROCEDURE — 2580000003 HC RX 258: Performed by: SPECIALIST

## 2018-11-19 PROCEDURE — 2580000003 HC RX 258: Performed by: INTERNAL MEDICINE

## 2018-11-19 PROCEDURE — 85027 COMPLETE CBC AUTOMATED: CPT

## 2018-11-19 PROCEDURE — 83735 ASSAY OF MAGNESIUM: CPT

## 2018-11-19 PROCEDURE — 6360000002 HC RX W HCPCS: Performed by: HOSPITALIST

## 2018-11-19 PROCEDURE — 2580000003 HC RX 258: Performed by: HOSPITALIST

## 2018-11-19 PROCEDURE — 99233 SBSQ HOSP IP/OBS HIGH 50: CPT | Performed by: INTERNAL MEDICINE

## 2018-11-19 PROCEDURE — 84100 ASSAY OF PHOSPHORUS: CPT

## 2018-11-19 PROCEDURE — 1200000000 HC SEMI PRIVATE

## 2018-11-19 PROCEDURE — C9113 INJ PANTOPRAZOLE SODIUM, VIA: HCPCS | Performed by: HOSPITALIST

## 2018-11-19 PROCEDURE — 97110 THERAPEUTIC EXERCISES: CPT

## 2018-11-19 PROCEDURE — 85730 THROMBOPLASTIN TIME PARTIAL: CPT

## 2018-11-19 PROCEDURE — 85610 PROTHROMBIN TIME: CPT

## 2018-11-19 RX ORDER — DEXTROSE, SODIUM CHLORIDE, AND POTASSIUM CHLORIDE 5; .45; .15 G/100ML; G/100ML; G/100ML
INJECTION INTRAVENOUS CONTINUOUS
Status: DISCONTINUED | OUTPATIENT
Start: 2018-11-19 | End: 2018-11-20

## 2018-11-19 RX ORDER — LORAZEPAM 2 MG/ML
0.5 INJECTION INTRAMUSCULAR ONCE
Status: DISCONTINUED | OUTPATIENT
Start: 2018-11-19 | End: 2018-11-20

## 2018-11-19 RX ADMIN — TAZOBACTAM SODIUM AND PIPERACILLIN SODIUM 3.38 G: 375; 3 INJECTION, SOLUTION INTRAVENOUS at 17:54

## 2018-11-19 RX ADMIN — Medication 10 ML: at 13:38

## 2018-11-19 RX ADMIN — PANTOPRAZOLE SODIUM 40 MG: 40 INJECTION, POWDER, FOR SOLUTION INTRAVENOUS at 10:04

## 2018-11-19 RX ADMIN — FLUCONAZOLE 400 MG: 400 INJECTION, SOLUTION INTRAVENOUS at 00:04

## 2018-11-19 RX ADMIN — ASCORBIC ACID: 500 INJECTION, SOLUTION INTRAMUSCULAR; INTRAVENOUS; SUBCUTANEOUS at 02:52

## 2018-11-19 RX ADMIN — Medication 10 ML: at 13:08

## 2018-11-19 RX ADMIN — THIAMINE HYDROCHLORIDE 200 MG: 100 INJECTION, SOLUTION INTRAMUSCULAR; INTRAVENOUS at 10:53

## 2018-11-19 RX ADMIN — Medication 10 ML: at 11:48

## 2018-11-19 RX ADMIN — HYDROCORTISONE SODIUM SUCCINATE 50 MG: 100 INJECTION, POWDER, FOR SOLUTION INTRAMUSCULAR; INTRAVENOUS at 17:54

## 2018-11-19 RX ADMIN — HYDROCORTISONE SODIUM SUCCINATE 50 MG: 100 INJECTION, POWDER, FOR SOLUTION INTRAMUSCULAR; INTRAVENOUS at 01:58

## 2018-11-19 RX ADMIN — POTASSIUM PHOSPHATE, MONOBASIC AND POTASSIUM PHOSPHATE, DIBASIC 30 MMOL: 224; 236 INJECTION, SOLUTION INTRAVENOUS at 20:58

## 2018-11-19 RX ADMIN — TAZOBACTAM SODIUM AND PIPERACILLIN SODIUM 3.38 G: 375; 3 INJECTION, SOLUTION INTRAVENOUS at 02:52

## 2018-11-19 RX ADMIN — Medication 10 ML: at 10:53

## 2018-11-19 RX ADMIN — HYDROCORTISONE SODIUM SUCCINATE 50 MG: 100 INJECTION, POWDER, FOR SOLUTION INTRAMUSCULAR; INTRAVENOUS at 13:07

## 2018-11-19 RX ADMIN — Medication 10 ML: at 10:05

## 2018-11-19 RX ADMIN — TAZOBACTAM SODIUM AND PIPERACILLIN SODIUM 3.38 G: 375; 3 INJECTION, SOLUTION INTRAVENOUS at 11:48

## 2018-11-19 RX ADMIN — HYDROCORTISONE SODIUM SUCCINATE 50 MG: 100 INJECTION, POWDER, FOR SOLUTION INTRAMUSCULAR; INTRAVENOUS at 06:04

## 2018-11-19 RX ADMIN — FENTANYL CITRATE 25 MCG: 50 INJECTION INTRAMUSCULAR; INTRAVENOUS at 13:38

## 2018-11-19 RX ADMIN — VANCOMYCIN HYDROCHLORIDE 1000 MG: 1 INJECTION, POWDER, LYOPHILIZED, FOR SOLUTION INTRAVENOUS at 00:22

## 2018-11-19 RX ADMIN — ASCORBIC ACID: 500 INJECTION, SOLUTION INTRAMUSCULAR; INTRAVENOUS; SUBCUTANEOUS at 15:21

## 2018-11-19 RX ADMIN — Medication 10 ML: at 10:03

## 2018-11-19 RX ADMIN — ASCORBIC ACID: 500 INJECTION, SOLUTION INTRAMUSCULAR; INTRAVENOUS; SUBCUTANEOUS at 10:04

## 2018-11-19 ASSESSMENT — PAIN SCALES - GENERAL
PAINLEVEL_OUTOF10: 0
PAINLEVEL_OUTOF10: 0
PAINLEVEL_OUTOF10: 4
PAINLEVEL_OUTOF10: 0
PAINLEVEL_OUTOF10: 7

## 2018-11-19 ASSESSMENT — PAIN DESCRIPTION - LOCATION: LOCATION: NECK;CHEST

## 2018-11-19 ASSESSMENT — PAIN DESCRIPTION - FREQUENCY: FREQUENCY: INTERMITTENT

## 2018-11-19 ASSESSMENT — PAIN DESCRIPTION - PROGRESSION: CLINICAL_PROGRESSION: NOT CHANGED

## 2018-11-19 ASSESSMENT — PAIN DESCRIPTION - ONSET: ONSET: ON-GOING

## 2018-11-19 ASSESSMENT — PAIN DESCRIPTION - DESCRIPTORS: DESCRIPTORS: ACHING;DISCOMFORT;SORE

## 2018-11-19 ASSESSMENT — PAIN DESCRIPTION - PAIN TYPE: TYPE: ACUTE PAIN

## 2018-11-19 ASSESSMENT — PAIN DESCRIPTION - ORIENTATION: ORIENTATION: RIGHT;UPPER

## 2018-11-19 NOTE — PROGRESS NOTES
[address] [place], [place] [address]   ICU Progress Note      Admitting Date and Time: 11/15/2018  2:16 AM  Admit Dx: Ascending cholangitis [K83.09]  Ascending cholangitis [K83.09]    Subjective:    Patient sitting up in bed watching TV. More awake today. Denies complaints. Per RN: GI ordered CT scan of chest/abd/pelvis to evaluate worsening leukocytosis     enoxaparin  40 mg Subcutaneous Daily    piperacillin-tazobactam  3.375 g Intravenous Q8H    sodium chloride flush  10 mL Intravenous 2 times per day    pantoprazole  40 mg Intravenous Daily    sodium chloride (PF)  10 mL Intravenous 2 times per day    magnesium sulfate  2 g Intravenous Once    IVPB builder   Intravenous Q6H    thiamine (VITAMIN B1) IVPB  200 mg Intravenous Q12H    hydrocortisone sodium succinate PF  50 mg Intravenous Q6H       magnesium hydroxide 30 mL Daily PRN   ondansetron 4 mg Q6H PRN   sodium chloride flush 10 mL PRN   promethazine 12.5 mg Q4H PRN   fentanNYL 25 mcg Q2H PRN   sodium chloride (PF) 10 mL PRN   potassium chloride 20 mEq PRN   magnesium sulfate 1 g PRN        Objective:    /79   Pulse 107   Temp 98.1 °F (36.7 °C) (Oral)   Resp 20   Ht 4' 11\" (1.499 m)   Wt 119 lb 3.2 oz (54.1 kg)   SpO2 94%   BMI 24.08 kg/m²   General Appearance: alert and in no acute distress  Skin: warm and dry  Head: normocephalic and atraumatic  Eyes: pupils equal, round, and reactive to light, extraocular eye movements intact, conjunctivae normal  Neck: neck supple and non tender without mass   Pulmonary/Chest: right upper chest R IJ subclavian. Coarse breath sounds.   Cardiovascular: normal rate, normal S1 and S2 and no carotid bruits  Abdomen: soft, non-tender, non-distended, normal bowel sounds, no masses or organomegaly  Extremities: no cyanosis, no clubbing and no edema  Neurologic: no cranial nerve deficit and speech normal      Recent Labs      11/17/18   0643  11/18/18   0550  11/18/18   1520   NA  148*  149*  146   K from ascending cholangitis--appreciate GI efforts with ERCP and stent placement. Placed on Zosyn on admission. WBC after ERCP shot up to 32.4 from 8.0. Despite antibiotic treatment WBC remains above 30 and today up to 45. .  ID was not on case nor was Diflucan as I had charted on Friday. However, with persistent elevated WBC and now positive blood culture for GNR, ID was consulted. She received one dose Vanco 11/15 after my discussion with pharm D.  Placed on Vitamin C protocol  11/15 with Solu-cortef, vitamin C and thiamine. ID consulted but no change in Rx at this time. CT chest/abd/pelvis obtained today which did show some some opacities in lungs. 2. Hypotension--resolved. Pressors discontinued afternoon 11/16. 3. Transaminitis--related to above process. Trending down. 4. Sinus tachycardia--secondary to above. Continue to monitor  5. Intractable N/V(resolved)-likely from cholangitis. Should improve as cholangitis resolves. 6. Lactic acidosis- resolved with last 3 values under 2. Lactic peaked at 6.6. Last two yesterday at 2.9. Yesterday, cut back IVF from 200 ml to 75 ml/hr on Friday and discontinue altogether yesterday. 7. Hypokalemia/hypomagnesemia--K and Mg protocols ordered. 8. Thrombocytopenia--hold Lovenox. Likely from sepsis. Should improve as infection clears. 9. Disposition--okay to transfer to telemetry. NOTE: This report was transcribed using voice recognition software.  Every effort was made to ensure accuracy; however, inadvertent computerized transcription errors may be present.     Electronically signed by Priscilla Eisenberg MD on 11/18/2018 at 7:10 PM

## 2018-11-19 NOTE — PROGRESS NOTES
Name:  Gomez Monsalve  :  1935  MRN:  [id number]  Room:  [facility]/4608-46  DOS:  2018    [address] [facility]  [facility]  Dr. Adriel Casper M.D. Dr. Jonny Dobbins M.D. Dr. Constanza Ramirez D.O. Dr. Dominique Brown M.D.    -Resident Progress Note-      Subjective  Patient states that she is feeling well aside from nausea and GI discomfort that occurs every time she receives her antibiotics without food. Patient is on IV zosyn q8 hours. Recommend that the patient keep crackers at the bedside, so that when antibiotic dosing is scheduled overnight she can have a small snack to alleviate any GI upset. Objective  Vitals:    18 2200 18 0000 18 0600 18 0728   BP: 132/85 115/64  127/83   Pulse: 103 100  97   Resp: 20 20     Temp: 98 °F (36.7 °C) 97.8 °F (36.6 °C)  98 °F (36.7 °C)   TempSrc: Oral Oral  Oral   SpO2: 97% 100%  98%   Weight:   122 lb (55.3 kg)    Height:             Review of Systems  All bolded are positive; please see HPI  General:  Fever, chills, diaphoresis, fatigue, malaise, night sweats, weight loss  Psychological:  Anxiety, disorientation, hallucinations. ENT:  Epistaxis, headaches, vertigo, visual changes. Cardiovascular:  Chest pain, irregular heartbeats, palpitations, paroxysmal nocturnal dyspnea. Respiratory:  Shortness of breath, coughing, sputum production, hemoptysis, wheezing, orthopnea.   Gastrointestinal:  Nausea (with antibiotics), vomiting, diarrhea, heartburn, constipation, abdominal pain, hematemesis, hematochezia, melena, acholic stools  Genito-Urinary:  Dysuria, urgency, frequency, hematuria  Musculoskeletal:  Joint pain, joint stiffness, joint swelling, muscle pain        Physical Examination  Vitals:  /83   Pulse 97   Temp 98 °F (36.7 °C) (Oral)   Resp 20   Ht 4' 11\" (1.499 m)   Wt 122 lb (55.3 kg)   SpO2 98%   BMI 24.64 kg/m²   General Appearance:  awake, alert, and oriented to person, place, time, and purpose;

## 2018-11-19 NOTE — CONSULTS
normal. ABDOMINAL WALL: Normal with no herniations. OSSEOUS AND SOFT TISSUE STRUCTURES: Bone windows demonstrate no suspicious osteolytic or osteoblastic lesions. No fracture identified. Chest: 1. Bilateral effusions with parenchymal opacities, likely infectious. Abdomen and pelvis: 1. Decreased size of the bile duct dilatation with a biliary catheter in satisfactory position. Ct Chest Wo Contrast    Result Date: 11/18/2018  LOCATION:200 EXAM: CT CHEST WO CONTRAST, CT ABDOMEN PELVIS WO CONTRAST COMPARISON: November 18, 2018 HISTORY: Abdominal pain with cholangitis TECHNIQUE:Helical chest, abdomen and pelvis CT was performed. Coronal and sagittal reconstructions also obtained. Automated dose control was used for this exam. CONTRAST: 80 mL Isovue-370 intravenous contrast was administered. Oral contrast was also administered. FINDINGS: SUPPORT DEVICES: None CHEST: LUNGS/CENTRAL AIRWAYS/PLEURA: Small to moderate bilateral effusions are identified with dependent atelectasis and collapse of the posterior lower lobes. Patchy groundglass opacities are seen peripherally in the right upper lobe and lingula. HEART/PERICARDIUM/GREAT VESSELS: Cardiac size is normal. There is no pericardial effusion. The great vessels of the chest are normal in caliber. LYMPH NODES: No thoracic adenopathy by size criteria. NECK BASE/CHEST WALL/DIAPHRAGM: No soft tissue lesions or diaphragmatic abnormality. ABDOMEN AND PELVIS: SOLID ORGANS: The liver, spleen, pancreas, and adrenal glands are normal. BILIARY SYSTEM: There is pneumobilia with a catheter in place. There is some gas noted along the course of the bile duct. Bile ducts have decompressed when compared to previous study. GENITOURINARY: The kidneys are normal in appearance bilaterally with no evidence of hydronephrosis. No stones are appreciated. Bladder is nondistended with Arango catheter in place.  GASTROINTESTINAL: The stomach, small and large bowel are normal in caliber Normal. No splenomegaly. Adrenals: Normal. No mass. Kidneys and ureters:  7 mm cyst left kidney. Stomach and bowel:  Diffuse wall thickening of the stomach suggestive of a nonspecific gastritis, versus artifact related to underdistention. Correlate clinically. Colonic diverticula present. Wall thickening with surrounding inflammatory change throughout the colon consistent with a nonspecific colitis, worse at the distal colon. Appendix: No evidence of appendicitis. PELVIS:  Bladder: Unremarkable as visualized. Reproductive: Unremarkable as visualized. ABDOMEN and PELVIS:  Intraperitoneal space:  Mild free fluid. Bones/joints: No acute fracture. No dislocation. Soft tissues: Unremarkable. Vasculature: Aorta demonstrates mild atherosclerotic calcification. Lymph nodes: Normal. No enlarged lymph nodes. 1. Diffuse wall thickening of the stomach suggestive of a nonspecific gastritis, versus artifact related to underdistention. Correlate clinically. 2. Intrahepatic and common bile duct dilation with the common duct up to 16 mm with associated pneumobilia. Intrahepatic ductal dilatation worse than previous exam. 3. Wall thickening with surrounding inflammatory change throughout the colon consistent with a nonspecific colitis, worse at the distal colon. COMMENT: Consistent with the [employer]'s Incidental Findings Committee Report (J Am Anders Radiol 2010): Unless the patient's specific circumstances suggest otherwise, any liver lesion 0.5 cm or less, any cystic kidney lesion less than 1.0 cm, and/or any adrenal lesion 1.0 cm or less not otherwise characterized in this report as possessing suspicious or indeterminate imaging features is/are highly likely to be benign and do not require follow-up imaging or biopsy. This report has been electronically signed by Mone Ocampo MD.    Fl Ercp Biliary And Pancreatic S&i    Result Date: 11/15/2018  Reading Location: [facility] Indication: Cholelithiasis.

## 2018-11-19 NOTE — PROGRESS NOTES
(Abnormal) Collected: 11/15/18 0514   Order Status: Completed Specimen: Blood Updated: 11/18/18 1234    Culture, Blood 2 -- (A)    24 Hours- no growth   Gram stain performed from blood culture bottle media   Gram negative rods     Organism Anaerobic gram negative olivia (A)    Culture, Blood 2 Identification and sensitivity to follow   Narrative:     Bimal Weber [id number],  Microbiology results called to and read back by RN-Edmond Samuels, 11/17/2018  08:16, by [facility]       Radiology:     RADIOLOGY REPORT   Final Result      CT CHEST WO CONTRAST   Final Result      Chest:   1. Bilateral effusions with parenchymal opacities, likely infectious. Abdomen and pelvis:   1. Decreased size of the bile duct dilatation with a biliary catheter   in satisfactory position. CT ABDOMEN PELVIS WO CONTRAST Additional Contrast? Oral   Final Result      Chest:   1. Bilateral effusions with parenchymal opacities, likely infectious. Abdomen and pelvis:   1. Decreased size of the bile duct dilatation with a biliary catheter   in satisfactory position. XR CHEST PORTABLE   Final Result   Enlarging effusions and CHF. FL ERCP BILIARY AND PANCREATIC S&I   Final Result   Provided fluoroscopic spot images demonstrate dilatation of the common   bile duct with multiple stones. There is subsequent placement of a   wire and biliary stent. XR CHEST PORTABLE   Final Result   1. Right subclavian catheter tip in appropriate position. No   pneumothorax. 2. Mild patchy left basilar airspace disease, likely atelectasis   versus pneumonia. 3. Stable emphysematous changes.       IR FLUORO GUIDED CVA DEVICE PLACEMENT    (Results Pending)       Assessment:  Principal Problem:    Ascending cholangitis  Active Problems:    Intractable nausea and vomiting    Severe sepsis (HCC)    GERD (gastroesophageal reflux disease)    Lactic acidosis    Sinus tachycardia    Thrombocytopenia (HCC)  Resolved Problems:    * No resolved

## 2018-11-20 ENCOUNTER — APPOINTMENT (OUTPATIENT)
Dept: INTERVENTIONAL RADIOLOGY/VASCULAR | Age: 83
DRG: 871 | End: 2018-11-20
Attending: HOSPITALIST
Payer: MEDICARE

## 2018-11-20 LAB
ALBUMIN SERPL-MCNC: 3.1 G/DL (ref 3.5–5.2)
ALP BLD-CCNC: 226 U/L (ref 35–104)
ALT SERPL-CCNC: 115 U/L (ref 0–32)
ANION GAP SERPL CALCULATED.3IONS-SCNC: 10 MMOL/L (ref 7–16)
AST SERPL-CCNC: 31 U/L (ref 0–31)
BASOPHILS ABSOLUTE: 0 E9/L (ref 0–0.2)
BASOPHILS RELATIVE PERCENT: 0.2 % (ref 0–2)
BILIRUB SERPL-MCNC: 0.7 MG/DL (ref 0–1.2)
BLOOD CULTURE, ROUTINE: NORMAL
BUN BLDV-MCNC: 22 MG/DL (ref 8–23)
CALCIUM SERPL-MCNC: 8.2 MG/DL (ref 8.6–10.2)
CHLORIDE BLD-SCNC: 112 MMOL/L (ref 98–107)
CO2: 26 MMOL/L (ref 22–29)
CREAT SERPL-MCNC: 1.1 MG/DL (ref 0.5–1)
EOSINOPHILS ABSOLUTE: 0 E9/L (ref 0.05–0.5)
EOSINOPHILS RELATIVE PERCENT: 0 % (ref 0–6)
GFR AFRICAN AMERICAN: 57
GFR NON-AFRICAN AMERICAN: 47 ML/MIN/1.73
GLUCOSE BLD-MCNC: 178 MG/DL (ref 74–99)
HCT VFR BLD CALC: 32.8 % (ref 34–48)
HEMOGLOBIN: 10.7 G/DL (ref 11.5–15.5)
LYMPHOCYTES ABSOLUTE: 1.44 E9/L (ref 1.5–4)
LYMPHOCYTES RELATIVE PERCENT: 2.6 % (ref 20–42)
MAGNESIUM: 1.8 MG/DL (ref 1.6–2.6)
MCH RBC QN AUTO: 28.5 PG (ref 26–35)
MCHC RBC AUTO-ENTMCNC: 32.6 % (ref 32–34.5)
MCV RBC AUTO: 87.5 FL (ref 80–99.9)
METAMYELOCYTES RELATIVE PERCENT: 0.9 % (ref 0–1)
MONOCYTES ABSOLUTE: 2.4 E9/L (ref 0.1–0.95)
MONOCYTES RELATIVE PERCENT: 5.2 % (ref 2–12)
MYELOCYTE PERCENT: 1.7 % (ref 0–0)
NEUTROPHILS ABSOLUTE: 44.25 E9/L (ref 1.8–7.3)
NEUTROPHILS RELATIVE PERCENT: 89.7 % (ref 43–80)
PDW BLD-RTO: 15.1 FL (ref 11.5–15)
PHOSPHORUS: 1.8 MG/DL (ref 2.5–4.5)
PLATELET # BLD: 95 E9/L (ref 130–450)
PLATELET CONFIRMATION: NORMAL
PMV BLD AUTO: 10.8 FL (ref 7–12)
POTASSIUM SERPL-SCNC: 3.3 MMOL/L (ref 3.5–5)
RBC # BLD: 3.75 E12/L (ref 3.5–5.5)
SODIUM BLD-SCNC: 148 MMOL/L (ref 132–146)
TOTAL PROTEIN: 5.5 G/DL (ref 6.4–8.3)
WBC # BLD: 48.1 E9/L (ref 4.5–11.5)
WHITE BLOOD CELLS (WBC), STOOL: NORMAL

## 2018-11-20 PROCEDURE — 83735 ASSAY OF MAGNESIUM: CPT

## 2018-11-20 PROCEDURE — 77001 FLUOROGUIDE FOR VEIN DEVICE: CPT | Performed by: RADIOLOGY

## 2018-11-20 PROCEDURE — 36569 INSJ PICC 5 YR+ W/O IMAGING: CPT | Performed by: RADIOLOGY

## 2018-11-20 PROCEDURE — C1751 CATH, INF, PER/CENT/MIDLINE: HCPCS

## 2018-11-20 PROCEDURE — 2500000003 HC RX 250 WO HCPCS: Performed by: INTERNAL MEDICINE

## 2018-11-20 PROCEDURE — 6360000002 HC RX W HCPCS: Performed by: HOSPITALIST

## 2018-11-20 PROCEDURE — 87070 CULTURE OTHR SPECIMN AEROBIC: CPT

## 2018-11-20 PROCEDURE — 36415 COLL VENOUS BLD VENIPUNCTURE: CPT

## 2018-11-20 PROCEDURE — 84100 ASSAY OF PHOSPHORUS: CPT

## 2018-11-20 PROCEDURE — 2580000003 HC RX 258: Performed by: INTERNAL MEDICINE

## 2018-11-20 PROCEDURE — 6360000002 HC RX W HCPCS: Performed by: INTERNAL MEDICINE

## 2018-11-20 PROCEDURE — 02HV33Z INSERTION OF INFUSION DEVICE INTO SUPERIOR VENA CAVA, PERCUTANEOUS APPROACH: ICD-10-PCS | Performed by: HOSPITALIST

## 2018-11-20 PROCEDURE — 97116 GAIT TRAINING THERAPY: CPT

## 2018-11-20 PROCEDURE — C9113 INJ PANTOPRAZOLE SODIUM, VIA: HCPCS | Performed by: HOSPITALIST

## 2018-11-20 PROCEDURE — 2580000003 HC RX 258: Performed by: HOSPITALIST

## 2018-11-20 PROCEDURE — 02PY33Z REMOVAL OF INFUSION DEVICE FROM GREAT VESSEL, PERCUTANEOUS APPROACH: ICD-10-PCS | Performed by: HOSPITALIST

## 2018-11-20 PROCEDURE — 99233 SBSQ HOSP IP/OBS HIGH 50: CPT | Performed by: INTERNAL MEDICINE

## 2018-11-20 PROCEDURE — 85025 COMPLETE CBC W/AUTO DIFF WBC: CPT

## 2018-11-20 PROCEDURE — 80053 COMPREHEN METABOLIC PANEL: CPT

## 2018-11-20 PROCEDURE — B548ZZA ULTRASONOGRAPHY OF SUPERIOR VENA CAVA, GUIDANCE: ICD-10-PCS | Performed by: HOSPITALIST

## 2018-11-20 PROCEDURE — 76937 US GUIDE VASCULAR ACCESS: CPT | Performed by: RADIOLOGY

## 2018-11-20 PROCEDURE — 1200000000 HC SEMI PRIVATE

## 2018-11-20 PROCEDURE — 6360000002 HC RX W HCPCS: Performed by: SPECIALIST

## 2018-11-20 PROCEDURE — 2700000000 HC OXYGEN THERAPY PER DAY

## 2018-11-20 PROCEDURE — 36592 COLLECT BLOOD FROM PICC: CPT

## 2018-11-20 RX ORDER — FLUCONAZOLE 2 MG/ML
200 INJECTION, SOLUTION INTRAVENOUS EVERY 24 HOURS
Status: DISCONTINUED | OUTPATIENT
Start: 2018-11-20 | End: 2018-11-21

## 2018-11-20 RX ORDER — HEPARIN SODIUM (PORCINE) LOCK FLUSH IV SOLN 100 UNIT/ML 100 UNIT/ML
100 SOLUTION INTRAVENOUS PRN
Status: DISCONTINUED | OUTPATIENT
Start: 2018-11-20 | End: 2018-11-26 | Stop reason: HOSPADM

## 2018-11-20 RX ADMIN — HYDROCORTISONE SODIUM SUCCINATE 50 MG: 100 INJECTION, POWDER, FOR SOLUTION INTRAMUSCULAR; INTRAVENOUS at 06:50

## 2018-11-20 RX ADMIN — Medication 10 ML: at 08:54

## 2018-11-20 RX ADMIN — POTASSIUM CHLORIDE, DEXTROSE MONOHYDRATE AND SODIUM CHLORIDE: 150; 5; 450 INJECTION, SOLUTION INTRAVENOUS at 01:54

## 2018-11-20 RX ADMIN — HYDROCORTISONE SODIUM SUCCINATE 50 MG: 100 INJECTION, POWDER, FOR SOLUTION INTRAMUSCULAR; INTRAVENOUS at 18:17

## 2018-11-20 RX ADMIN — POTASSIUM PHOSPHATE, MONOBASIC AND POTASSIUM PHOSPHATE, DIBASIC 30 MMOL: 224; 236 INJECTION, SOLUTION INTRAVENOUS at 15:46

## 2018-11-20 RX ADMIN — POTASSIUM CHLORIDE 20 MEQ: 29.8 INJECTION, SOLUTION INTRAVENOUS at 21:33

## 2018-11-20 RX ADMIN — Medication 10 ML: at 21:35

## 2018-11-20 RX ADMIN — PANTOPRAZOLE SODIUM 40 MG: 40 INJECTION, POWDER, FOR SOLUTION INTRAVENOUS at 08:53

## 2018-11-20 RX ADMIN — Medication 10 ML: at 08:53

## 2018-11-20 RX ADMIN — TAZOBACTAM SODIUM AND PIPERACILLIN SODIUM 3.38 G: 375; 3 INJECTION, SOLUTION INTRAVENOUS at 14:53

## 2018-11-20 RX ADMIN — TAZOBACTAM SODIUM AND PIPERACILLIN SODIUM 3.38 G: 375; 3 INJECTION, SOLUTION INTRAVENOUS at 01:54

## 2018-11-20 RX ADMIN — FLUCONAZOLE IN SODIUM CHLORIDE 200 MG: 2 INJECTION, SOLUTION INTRAVENOUS at 14:52

## 2018-11-20 RX ADMIN — TAZOBACTAM SODIUM AND PIPERACILLIN SODIUM 3.38 G: 375; 3 INJECTION, SOLUTION INTRAVENOUS at 18:14

## 2018-11-20 RX ADMIN — Medication 10 ML: at 21:33

## 2018-11-20 ASSESSMENT — PAIN SCALES - GENERAL
PAINLEVEL_OUTOF10: 0

## 2018-11-20 NOTE — PROGRESS NOTES
PROGRESS NOTE  By Darrell Swenson M.D. [facility]  Dr. Miguelangel Holguin M.D.,  Dr. Hui Dao M.D.,   KATHLEEN EchavarriaO.,  Dr. Chelsi Golden M.D.,  Dr Claudette Blank D.O. Parth Selbyardi  80 y.o.  female    SUBJECTIVE:  Denies abdominal pain. Denies nausea vomiting. Reports up to 5 bowel movements per day which she reports is usual for her. OBJECTIVE:    /78   Pulse 97   Temp 98 °F (36.7 °C) (Oral)   Resp 20   Ht 4' 11\" (1.499 m)   Wt 122 lb (55.3 kg)   SpO2 98%   BMI 24.64 kg/m²     General:NAD/ female. AAO ×3  HEENT: Anicteric sclerae/moist oromucosa  Neck: Supple  Abd.: Soft/nondistended/nontender  Extr.: Decreased muscle tone and bulk throughout  Skin: Warm and dry        DATA:    Stool (measured) : 0 mL  Lab Results   Component Value Date    WBC 53.8 11/19/2018    RBC 3.50 11/19/2018    RBC  06/24/2015      RBC           [id number]    released     06/28/15  00:58  SCC    HGB 10.1 11/19/2018    HCT 30.2 11/19/2018    MCV 86.3 11/19/2018    MCH 28.9 11/19/2018    MCHC 33.4 11/19/2018    RDW 15.3 11/19/2018    PLT 59 11/19/2018    MPV 12.3 11/19/2018     Lab Results   Component Value Date     11/19/2018    K 3.3 11/19/2018    K 3.3 11/15/2018     11/19/2018    CO2 26 11/19/2018    BUN 23 11/19/2018    CREATININE 1.1 11/19/2018    CALCIUM 8.1 11/19/2018    PROT 5.2 11/19/2018    LABALBU 2.7 11/19/2018    BILITOT 1.0 11/19/2018    ALKPHOS 323 11/19/2018    AST 40 11/19/2018     11/19/2018     Lab Results   Component Value Date    LIPASE 33 11/15/2018     Lab Results   Component Value Date    AMYLASE 3,857 06/25/2015         ASSESSMENT/PLAN:  1. Ascending cholangitis - biliary sepsis, s/p ERCP with stent placement 11/15/18  - Pt with prior ERCP sphincterotomy and stent placement and subsequent removal in 2015. Complicated with PEP.     - Continue Abx for minimum of 5-7 days - ID is on the case and managing abx  - Correction of

## 2018-11-20 NOTE — CARE COORDINATION
Ss note: 11/20/2018.1:33 PM consult noted for LTAC, met with pt and discussed consult. Pt would prefer to stay here therefore is in agreement with referral to [facility], referral made and per liaison pt has ltac criteria, will need to know abx orders; [facility] will submit for PRECERT which will take up to 72 hours or more due to holiday. SW spoke with pts sulemar Bennie Uribe and she is also in agreement with LTAC. SW also discussed SNF and pt prefers 1. [facility] 2. [facility] as SNF choices as dtr does work. SW did check bed availability at [address]  however currently full; liaison will follow. Referral made to Bennie Uribe at [facility]. At this time plan is [address], awaiting abx orders and PRECERT.  JAMIE Garcia

## 2018-11-20 NOTE — PROGRESS NOTES
[id number]    Patient unavailable for physical therapy treatment due to nursing requested not at this time d/t currently working with patient and then the patient is to be going for PICC line. Will attempt treatment at a later time/date.          [address] [facility], [facility]

## 2018-11-20 NOTE — PROGRESS NOTES
decreased space from walker, walker management, and obstacle negotiation. Pt ambulated wearing 3L O2. Steps: NA  Treatment: Pt stood from chair and ambulated to bathroom. Pt require assist with commode transfers and then ambulated to transport cart. Comment: Pt was left in care of transport at end of treatment. Call light left by patient. A: Pt had limited ambulation distance as above but was motivated to participate. Pt con't to be at risk of falls d/t unsteadiness. Pt also required frequent cueing for walker management and safety. P: Continue with physical therapy   Vivienne Ballesteros PTA    Goals to be met in 3 days. Bed mobility- Independent  Transfers- Independent  Ambulation- Independent for 50 feet using wheeled walker        Increase strength in affected muscle groups by 1/3 grade  Increase balance to allow for improvement towards functional goals. Increase endurance to allow for improvement towards functional goals.

## 2018-11-21 PROBLEM — E43 SEVERE PROTEIN-CALORIE MALNUTRITION (HCC): Status: ACTIVE | Noted: 2018-11-21

## 2018-11-21 LAB
ALBUMIN SERPL-MCNC: 3 G/DL (ref 3.5–5.2)
ALP BLD-CCNC: 228 U/L (ref 35–104)
ALT SERPL-CCNC: 96 U/L (ref 0–32)
ANION GAP SERPL CALCULATED.3IONS-SCNC: 12 MMOL/L (ref 7–16)
AST SERPL-CCNC: 29 U/L (ref 0–31)
BASOPHILS ABSOLUTE: 0 E9/L (ref 0–0.2)
BASOPHILS RELATIVE PERCENT: 0 % (ref 0–2)
BILIRUB SERPL-MCNC: 0.9 MG/DL (ref 0–1.2)
BILIRUBIN DIRECT: 0.4 MG/DL (ref 0–0.3)
BILIRUBIN, INDIRECT: 0.5 MG/DL (ref 0–1)
BUN BLDV-MCNC: 21 MG/DL (ref 8–23)
BURR CELLS: ABNORMAL
C DIFFICILE TOXIN, EIA: NORMAL
CALCIUM SERPL-MCNC: 7.8 MG/DL (ref 8.6–10.2)
CHLORIDE BLD-SCNC: 110 MMOL/L (ref 98–107)
CO2: 26 MMOL/L (ref 22–29)
CREAT SERPL-MCNC: 1 MG/DL (ref 0.5–1)
CULTURE, STOOL: NORMAL
EOSINOPHILS ABSOLUTE: 0 E9/L (ref 0.05–0.5)
EOSINOPHILS RELATIVE PERCENT: 0 % (ref 0–6)
GFR AFRICAN AMERICAN: >60
GFR NON-AFRICAN AMERICAN: 53 ML/MIN/1.73
GLUCOSE BLD-MCNC: 120 MG/DL (ref 74–99)
HCT VFR BLD CALC: 31.1 % (ref 34–48)
HEMOGLOBIN: 10.3 G/DL (ref 11.5–15.5)
LYMPHOCYTES ABSOLUTE: 3.06 E9/L (ref 1.5–4)
LYMPHOCYTES RELATIVE PERCENT: 7 % (ref 20–42)
MAGNESIUM: 2 MG/DL (ref 1.6–2.6)
MCH RBC QN AUTO: 28.9 PG (ref 26–35)
MCHC RBC AUTO-ENTMCNC: 33.1 % (ref 32–34.5)
MCV RBC AUTO: 87.1 FL (ref 80–99.9)
METAMYELOCYTES RELATIVE PERCENT: 2 % (ref 0–1)
MONOCYTES ABSOLUTE: 1.75 E9/L (ref 0.1–0.95)
MONOCYTES RELATIVE PERCENT: 4 % (ref 2–12)
MYELOCYTE PERCENT: 2 % (ref 0–0)
NEUTROPHILS ABSOLUTE: 38.89 E9/L (ref 1.8–7.3)
NEUTROPHILS RELATIVE PERCENT: 85 % (ref 43–80)
OVALOCYTES: ABNORMAL
PDW BLD-RTO: 14.9 FL (ref 11.5–15)
PHOSPHORUS: 4 MG/DL (ref 2.5–4.5)
PLATELET # BLD: 127 E9/L (ref 130–450)
PMV BLD AUTO: 11.4 FL (ref 7–12)
POIKILOCYTES: ABNORMAL
POTASSIUM SERPL-SCNC: 3.5 MMOL/L (ref 3.5–5)
RBC # BLD: 3.57 E12/L (ref 3.5–5.5)
SODIUM BLD-SCNC: 148 MMOL/L (ref 132–146)
TOTAL PROTEIN: 5.5 G/DL (ref 6.4–8.3)
WBC # BLD: 43.7 E9/L (ref 4.5–11.5)

## 2018-11-21 PROCEDURE — 2580000003 HC RX 258: Performed by: SPECIALIST

## 2018-11-21 PROCEDURE — C9113 INJ PANTOPRAZOLE SODIUM, VIA: HCPCS | Performed by: HOSPITALIST

## 2018-11-21 PROCEDURE — 36592 COLLECT BLOOD FROM PICC: CPT

## 2018-11-21 PROCEDURE — 83735 ASSAY OF MAGNESIUM: CPT

## 2018-11-21 PROCEDURE — 2580000003 HC RX 258: Performed by: HOSPITALIST

## 2018-11-21 PROCEDURE — 6360000002 HC RX W HCPCS: Performed by: SPECIALIST

## 2018-11-21 PROCEDURE — 6360000002 HC RX W HCPCS: Performed by: RADIOLOGY

## 2018-11-21 PROCEDURE — 97530 THERAPEUTIC ACTIVITIES: CPT

## 2018-11-21 PROCEDURE — 84075 ASSAY ALKALINE PHOSPHATASE: CPT

## 2018-11-21 PROCEDURE — 87324 CLOSTRIDIUM AG IA: CPT

## 2018-11-21 PROCEDURE — 84080 ASSAY ALKALINE PHOSPHATASES: CPT

## 2018-11-21 PROCEDURE — 6360000002 HC RX W HCPCS: Performed by: HOSPITALIST

## 2018-11-21 PROCEDURE — 99232 SBSQ HOSP IP/OBS MODERATE 35: CPT | Performed by: INTERNAL MEDICINE

## 2018-11-21 PROCEDURE — 6370000000 HC RX 637 (ALT 250 FOR IP): Performed by: SPECIALIST

## 2018-11-21 PROCEDURE — 80076 HEPATIC FUNCTION PANEL: CPT

## 2018-11-21 PROCEDURE — 85025 COMPLETE CBC W/AUTO DIFF WBC: CPT

## 2018-11-21 PROCEDURE — 36415 COLL VENOUS BLD VENIPUNCTURE: CPT

## 2018-11-21 PROCEDURE — 1200000000 HC SEMI PRIVATE

## 2018-11-21 PROCEDURE — 2580000003 HC RX 258: Performed by: INTERNAL MEDICINE

## 2018-11-21 PROCEDURE — 84100 ASSAY OF PHOSPHORUS: CPT

## 2018-11-21 PROCEDURE — 97116 GAIT TRAINING THERAPY: CPT

## 2018-11-21 PROCEDURE — 6360000002 HC RX W HCPCS: Performed by: INTERNAL MEDICINE

## 2018-11-21 PROCEDURE — 80048 BASIC METABOLIC PNL TOTAL CA: CPT

## 2018-11-21 PROCEDURE — 2500000003 HC RX 250 WO HCPCS: Performed by: INTERNAL MEDICINE

## 2018-11-21 RX ORDER — FLUCONAZOLE 100 MG/1
200 TABLET ORAL DAILY
Status: DISCONTINUED | OUTPATIENT
Start: 2018-11-21 | End: 2018-11-23

## 2018-11-21 RX ORDER — FLUCONAZOLE 200 MG/1
200 TABLET ORAL DAILY
Qty: 7 TABLET | Refills: 0 | Status: SHIPPED | OUTPATIENT
Start: 2018-11-21 | End: 2018-11-28

## 2018-11-21 RX ADMIN — FLUCONAZOLE 200 MG: 100 TABLET ORAL at 09:58

## 2018-11-21 RX ADMIN — TAZOBACTAM SODIUM AND PIPERACILLIN SODIUM 3.38 G: 375; 3 INJECTION, SOLUTION INTRAVENOUS at 01:43

## 2018-11-21 RX ADMIN — HYDROCORTISONE SODIUM SUCCINATE 50 MG: 100 INJECTION, POWDER, FOR SOLUTION INTRAMUSCULAR; INTRAVENOUS at 05:22

## 2018-11-21 RX ADMIN — Medication 100 UNITS: at 05:25

## 2018-11-21 RX ADMIN — Medication 10 ML: at 22:07

## 2018-11-21 RX ADMIN — SODIUM CHLORIDE 3 G: 900 INJECTION INTRAVENOUS at 16:24

## 2018-11-21 RX ADMIN — PANTOPRAZOLE SODIUM 40 MG: 40 INJECTION, POWDER, FOR SOLUTION INTRAVENOUS at 09:58

## 2018-11-21 RX ADMIN — SODIUM CHLORIDE 3 G: 900 INJECTION INTRAVENOUS at 22:07

## 2018-11-21 RX ADMIN — Medication 100 UNITS: at 10:48

## 2018-11-21 RX ADMIN — Medication 10 ML: at 05:26

## 2018-11-21 RX ADMIN — HYDROCORTISONE SODIUM SUCCINATE 50 MG: 100 INJECTION, POWDER, FOR SOLUTION INTRAMUSCULAR; INTRAVENOUS at 19:11

## 2018-11-21 RX ADMIN — SODIUM CHLORIDE 3 G: 900 INJECTION INTRAVENOUS at 09:57

## 2018-11-21 RX ADMIN — Medication 10 ML: at 09:58

## 2018-11-21 RX ADMIN — Medication 10 ML: at 09:59

## 2018-11-21 ASSESSMENT — PAIN SCALES - GENERAL: PAINLEVEL_OUTOF10: 0

## 2018-11-21 NOTE — PROGRESS NOTES
Jan Marcum [facility] 1935    SUBJECTIVE:    Pt feels improved today. She states she is now having about 8 watery bowel movements a day. ROS:   No chills/sweats  No cp, palp  No cough/sob    OBJECTIVE  Physical Exam:  VITALS:  BP (!) 144/83   Pulse 93   Temp 97.7 °F (36.5 °C) (Oral)   Resp 18   Ht 4' 11\" (1.499 m)   Wt 126 lb 6.4 oz (57.3 kg)   SpO2 93%   BMI 25.53 kg/m²   ENT:  oropharynx clear, no evidence of mucositis. NECK:  No  lymphadenopathy. HEMATOLOGIC/LYMPHATICS:  No abnormal lymphadenopathy. LUNGS:  Clear to auscultation   CARDIOVASCULAR:  Regular rate and rhythm. No clicks, murmurs, rubs or gallops. ABDOMEN:  Soft, nontender. No ascites. No mass or organomegaly. NEUROLOGIC:  No focal deficits. SKIN:  No rash. EXTREMITIES: without clubbing, cyanosis, or edema.     DIAGNOSTIC DATA  Labs:    Lab Results   Component Value Date    WBC 43.7 (H) 11/21/2018    HGB 10.3 (L) 11/21/2018    HCT 31.1 (L) 11/21/2018    MCV 87.1 11/21/2018     (L) 11/21/2018     No results found for: CEA  Recent Labs      11/19/18   0535  11/19/18   1215  11/20/18   1304  11/21/18   0500   NA  150*  148*  148*  148*   CO2  26  26  26  26   PHOS  1.6*   --   1.8*  4.0   BUN  23  23  22  21   CREATININE  1.1*  1.1*  1.1*  1.0     No results found for: FERRITIN  Lab Results   Component Value Date    ALT 96 (H) 11/21/2018    AST 29 11/21/2018    ALKPHOS 228 (H) 11/21/2018    BILITOT 0.9 11/21/2018     Lab Results   Component Value Date    LABALBU 3.0 (L) 11/21/2018         Current Facility-Administered Medications   Medication Dose Route Frequency Provider Last Rate Last Dose    ampicillin-sulbactam (UNASYN) 3 g ivpb minibag  3 g Intravenous Q6H Nika Dickinson MD        fluconazole (DIFLUCAN) tablet 200 mg  200 mg Oral Daily Nika Dickinson MD        heparin flush 100 UNIT/ML injection 100 Units  100 Units Intercatheter PRN Rasheed Ragsdale MD   100 Units at 11/21/18 0525    pneumococcal 13-valent

## 2018-11-21 NOTE — PROGRESS NOTES
O - 24hr:    Intake/Output Summary (Last 24 hours) at 11/21/18 0901  Last data filed at 11/21/18 0639   Gross per 24 hour   Intake              570 ml   Output              850 ml   Net             -280 ml     No intake/output data recorded. Weight change: 4 lb 6.4 oz (1.996 kg)  LABS:    Recent Labs      11/19/18   1215  11/20/18   1304  11/21/18   0500   WBC  53.8*  48.1*  43.7*   HGB  10.1*  10.7*  10.3*   HCT  30.2*  32.8*  31.1*   MCV  86.3  87.5  87.1   PLT  59*  95*  127*     Recent Labs      11/19/18   0535  11/19/18   1215  11/20/18   1304  11/21/18   0500   NA  150*  148*  148*  148*   K  3.8  3.3*  3.3*  3.5   CL  114*  111*  112*  110*   CO2  26  26  26  26   BUN  23  23  22  21   CREATININE  1.1*  1.1*  1.1*  1.0   GFRAA  57  57  57  >60   LABGLOM  47  47  47  53   GLUCOSE  156*  144*  178*  120*   PROT  5.2*   --   5.5*  5.5*   LABALBU  2.7*   --   3.1*  3.0*   CALCIUM  8.0*  8.1*  8.2*  7.8*   BILITOT  1.0   --   0.7  0.9   ALKPHOS  323*   --   226*  228*   AST  40*   --   31  29   ALT  156*   --   115*  96*     MICROBIOLOGY:   Blood cx GNR    RADIOLOGY:  IR ULTRASOUND GUIDANCE VASCULAR ACCESS   Final Result   Ultrasound guidance was provided during placement of a   PICC line. IR FLUORO GUIDED CVA DEVICE PLACEMENT   Final Result   Successful placement of a 5 Croatian double-lumen Power   PICC line using ultrasound guidance via the left basilic  vein. RADIOLOGY REPORT   Final Result      CT CHEST WO CONTRAST   Final Result      Chest:   1. Bilateral effusions with parenchymal opacities, likely infectious. Abdomen and pelvis:   1. Decreased size of the bile duct dilatation with a biliary catheter   in satisfactory position. CT ABDOMEN PELVIS WO CONTRAST Additional Contrast? Oral   Final Result      Chest:   1. Bilateral effusions with parenchymal opacities, likely infectious. Abdomen and pelvis:   1.  Decreased size of the bile duct dilatation with a biliary catheter   in satisfactory position. XR CHEST PORTABLE   Final Result   Enlarging effusions and CHF. FL ERCP BILIARY AND PANCREATIC S&I   Final Result   Provided fluoroscopic spot images demonstrate dilatation of the common   bile duct with multiple stones. There is subsequent placement of a   wire and biliary stent. XR CHEST PORTABLE   Final Result   1. Right subclavian catheter tip in appropriate position. No   pneumothorax. 2. Mild patchy left basilar airspace disease, likely atelectasis   versus pneumonia. 3. Stable emphysematous changes. Assessment/Plan:   80 y.o. female presented with severe GN sepsis anaerobic   Leukocytosis follow    Ascending cholangitis  -cont piptazo  -wbc still up -stable  -removed line  Cont ANTIFUNGAL  -lft better  viola follow  Pt feels better  IS   d/c planning     Imaging and labs were reviewed per medical records and any ID pertinent labs were addressed with the patient.    SEE ORDERS    Electronically signed by Kandace Mcintosh MD on 11/21/2018 at 9:01 AM

## 2018-11-21 NOTE — PROGRESS NOTES
#2 [[id number]] (Abnormal) Collected: 11/15/18 0514   Order Status: Completed Specimen: Blood Updated: 11/18/18 1234    Culture, Blood 2 -- (A)    24 Hours- no growth   Gram stain performed from blood culture bottle media   Gram negative rods     Organism Anaerobic gram negative olivia (A)    Culture, Blood 2 Identification and sensitivity to follow   Narrative:     Roxanne Kan [phone],  Microbiology results called to and read back by RN-Edmond Samuels, 11/17/2018  08:16, by [facility]       Radiology:     IR ULTRASOUND GUIDANCE VASCULAR ACCESS   Final Result   Ultrasound guidance was provided during placement of a   PICC line. IR FLUORO GUIDED CVA DEVICE PLACEMENT   Final Result   Successful placement of a 5 [place]ian double-lumen Power   PICC line using ultrasound guidance via the left basilic  vein. RADIOLOGY REPORT   Final Result      CT CHEST WO CONTRAST   Final Result      Chest:   1. Bilateral effusions with parenchymal opacities, likely infectious. Abdomen and pelvis:   1. Decreased size of the bile duct dilatation with a biliary catheter   in satisfactory position. CT ABDOMEN PELVIS WO CONTRAST Additional Contrast? Oral   Final Result      Chest:   1. Bilateral effusions with parenchymal opacities, likely infectious. Abdomen and pelvis:   1. Decreased size of the bile duct dilatation with a biliary catheter   in satisfactory position. XR CHEST PORTABLE   Final Result   Enlarging effusions and CHF. FL ERCP BILIARY AND PANCREATIC S&I   Final Result   Provided fluoroscopic spot images demonstrate dilatation of the common   bile duct with multiple stones. There is subsequent placement of a   wire and biliary stent. XR CHEST PORTABLE   Final Result   1. Right subclavian catheter tip in appropriate position. No   pneumothorax. 2. Mild patchy left basilar airspace disease, likely atelectasis   versus pneumonia. 3. Stable emphysematous changes.

## 2018-11-21 NOTE — PLAN OF CARE
Problem: Risk for Impaired Skin Integrity  Goal: Tissue integrity - skin and mucous membranes  Structural intactness and normal physiological function of skin and  mucous membranes.      Intervention: SKIN ASSESSMENT  Skin assessed-buttocks red- diarrhea

## 2018-11-22 LAB
BASOPHILS ABSOLUTE: 0 E9/L (ref 0–0.2)
BASOPHILS RELATIVE PERCENT: 0.3 % (ref 0–2)
BLOOD CULTURE, ROUTINE: NORMAL
CULTURE CATHETER TIP: NORMAL
CULTURE, BLOOD 2: NORMAL
EKG ATRIAL RATE: 143 BPM
EKG P AXIS: 78 DEGREES
EKG P-R INTERVAL: 120 MS
EKG Q-T INTERVAL: 284 MS
EKG QRS DURATION: 94 MS
EKG QTC CALCULATION (BAZETT): 438 MS
EKG R AXIS: -69 DEGREES
EKG T AXIS: 80 DEGREES
EKG VENTRICULAR RATE: 143 BPM
EOSINOPHILS ABSOLUTE: 0 E9/L (ref 0.05–0.5)
EOSINOPHILS RELATIVE PERCENT: 0 % (ref 0–6)
HCT VFR BLD CALC: 29.4 % (ref 34–48)
HEMOGLOBIN: 9.8 G/DL (ref 11.5–15.5)
HYPOCHROMIA: ABNORMAL
LYMPHOCYTES ABSOLUTE: 0.32 E9/L (ref 1.5–4)
LYMPHOCYTES RELATIVE PERCENT: 0.9 % (ref 20–42)
MAGNESIUM: 1.7 MG/DL (ref 1.6–2.6)
MCH RBC QN AUTO: 28.8 PG (ref 26–35)
MCHC RBC AUTO-ENTMCNC: 33.3 % (ref 32–34.5)
MCV RBC AUTO: 86.5 FL (ref 80–99.9)
MONOCYTES ABSOLUTE: 0.63 E9/L (ref 0.1–0.95)
MONOCYTES RELATIVE PERCENT: 1.8 % (ref 2–12)
MYELOCYTE PERCENT: 2.6 % (ref 0–0)
NEUTROPHILS ABSOLUTE: 30.65 E9/L (ref 1.8–7.3)
NEUTROPHILS RELATIVE PERCENT: 94.7 % (ref 43–80)
NUCLEATED RED BLOOD CELLS: 0.9 /100 WBC
OVALOCYTES: ABNORMAL
PDW BLD-RTO: 14.5 FL (ref 11.5–15)
PHOSPHORUS: 3 MG/DL (ref 2.5–4.5)
PLATELET # BLD: 157 E9/L (ref 130–450)
PMV BLD AUTO: 10.8 FL (ref 7–12)
POIKILOCYTES: ABNORMAL
RBC # BLD: 3.4 E12/L (ref 3.5–5.5)
WBC # BLD: 31.6 E9/L (ref 4.5–11.5)

## 2018-11-22 PROCEDURE — 2580000003 HC RX 258: Performed by: HOSPITALIST

## 2018-11-22 PROCEDURE — 99232 SBSQ HOSP IP/OBS MODERATE 35: CPT | Performed by: INTERNAL MEDICINE

## 2018-11-22 PROCEDURE — 2580000003 HC RX 258: Performed by: SPECIALIST

## 2018-11-22 PROCEDURE — 6370000000 HC RX 637 (ALT 250 FOR IP): Performed by: SPECIALIST

## 2018-11-22 PROCEDURE — 2580000003 HC RX 258: Performed by: INTERNAL MEDICINE

## 2018-11-22 PROCEDURE — 83735 ASSAY OF MAGNESIUM: CPT

## 2018-11-22 PROCEDURE — 6360000002 HC RX W HCPCS: Performed by: INTERNAL MEDICINE

## 2018-11-22 PROCEDURE — 36415 COLL VENOUS BLD VENIPUNCTURE: CPT

## 2018-11-22 PROCEDURE — 36592 COLLECT BLOOD FROM PICC: CPT

## 2018-11-22 PROCEDURE — 6360000002 HC RX W HCPCS: Performed by: HOSPITALIST

## 2018-11-22 PROCEDURE — 6360000002 HC RX W HCPCS: Performed by: RADIOLOGY

## 2018-11-22 PROCEDURE — 1200000000 HC SEMI PRIVATE

## 2018-11-22 PROCEDURE — C9113 INJ PANTOPRAZOLE SODIUM, VIA: HCPCS | Performed by: HOSPITALIST

## 2018-11-22 PROCEDURE — 84100 ASSAY OF PHOSPHORUS: CPT

## 2018-11-22 PROCEDURE — 85025 COMPLETE CBC W/AUTO DIFF WBC: CPT

## 2018-11-22 PROCEDURE — 6360000002 HC RX W HCPCS: Performed by: SPECIALIST

## 2018-11-22 RX ADMIN — Medication 10 ML: at 21:27

## 2018-11-22 RX ADMIN — Medication 10 ML: at 08:27

## 2018-11-22 RX ADMIN — FLUCONAZOLE 200 MG: 100 TABLET ORAL at 08:26

## 2018-11-22 RX ADMIN — SODIUM CHLORIDE 3 G: 900 INJECTION INTRAVENOUS at 03:32

## 2018-11-22 RX ADMIN — Medication 100 UNITS: at 21:28

## 2018-11-22 RX ADMIN — HYDROCORTISONE SODIUM SUCCINATE 50 MG: 100 INJECTION, POWDER, FOR SOLUTION INTRAMUSCULAR; INTRAVENOUS at 08:26

## 2018-11-22 RX ADMIN — PANTOPRAZOLE SODIUM 40 MG: 40 INJECTION, POWDER, FOR SOLUTION INTRAVENOUS at 08:27

## 2018-11-22 RX ADMIN — Medication 10 ML: at 21:28

## 2018-11-22 RX ADMIN — SODIUM CHLORIDE 3 G: 900 INJECTION INTRAVENOUS at 21:27

## 2018-11-22 RX ADMIN — SODIUM CHLORIDE 3 G: 900 INJECTION INTRAVENOUS at 16:17

## 2018-11-22 RX ADMIN — Medication 10 ML: at 08:26

## 2018-11-22 RX ADMIN — SODIUM CHLORIDE 3 G: 900 INJECTION INTRAVENOUS at 09:29

## 2018-11-22 ASSESSMENT — PAIN SCALES - GENERAL
PAINLEVEL_OUTOF10: 0

## 2018-11-22 NOTE — PROGRESS NOTES
- Continue Abx for minimum of 5-7 days - ID is on the case and managing abx  - Correction of electrolytes per admitting  - Tolerating diet well, mild GI upset with antibiotics   - Increased alkaline phosphatase but bilirubin remains nonelevated and transaminases trending down     2. Leukocytosis -  - C-diff and stool studies pending negative  - CT A/p and CT chest: bilateral effusions with opacities, likely infectious  - Blood cx (11/15) positive for GNR  - antibiotics - per ID service     3. Normocytic anemia  - stable H&H    - Component of myelosuppression likely 2/2 sepsis and kidney dysfunction  - unable to visualize stomach and esophagus well with side-viewing scope, she will eventually require non-emergent work-up with EGD, inpt vs outpt to be determined.      4. Thrombocytopenia - over 100K - hematology input appreciated        5. Metabolic acidosis - with RAFAEL -resolved with normalize creatinine.  Per admitting     Gabe Adjutant,   11/22/2018  10:05 AM

## 2018-11-22 NOTE — PROGRESS NOTES
hours) at 11/22/18 0958  Last data filed at 11/22/18 0555   Gross per 24 hour   Intake              540 ml   Output             1650 ml   Net            -1110 ml     No intake/output data recorded. Weight change: -5 lb 6.4 oz (-2.449 kg)  LABS:    Recent Labs      11/20/18   1304  11/21/18   0500  11/22/18   0525   WBC  48.1*  43.7*  31.6*   HGB  10.7*  10.3*  9.8*   HCT  32.8*  31.1*  29.4*   MCV  87.5  87.1  86.5   PLT  95*  127*  157     Recent Labs      11/19/18   1215  11/20/18   1304  11/21/18   0500   NA  148*  148*  148*   K  3.3*  3.3*  3.5   CL  111*  112*  110*   CO2  26  26  26   BUN  23  22  21   CREATININE  1.1*  1.1*  1.0   GFRAA  57  57  >60   LABGLOM  47  47  53   GLUCOSE  144*  178*  120*   PROT   --   5.5*  5.5*   LABALBU   --   3.1*  3.0*   CALCIUM  8.1*  8.2*  7.8*   BILITOT   --   0.7  0.9   ALKPHOS   --   226*  228*   AST   --   31  29   ALT   --   115*  96*     MICROBIOLOGY:   Blood cx GNR    RADIOLOGY:  IR ULTRASOUND GUIDANCE VASCULAR ACCESS   Final Result   Ultrasound guidance was provided during placement of a   PICC line. IR FLUORO GUIDED CVA DEVICE PLACEMENT   Final Result   Successful placement of a 5 Spanish double-lumen Power   PICC line using ultrasound guidance via the left basilic  vein. RADIOLOGY REPORT   Final Result      CT CHEST WO CONTRAST   Final Result      Chest:   1. Bilateral effusions with parenchymal opacities, likely infectious. Abdomen and pelvis:   1. Decreased size of the bile duct dilatation with a biliary catheter   in satisfactory position. CT ABDOMEN PELVIS WO CONTRAST Additional Contrast? Oral   Final Result      Chest:   1. Bilateral effusions with parenchymal opacities, likely infectious. Abdomen and pelvis:   1. Decreased size of the bile duct dilatation with a biliary catheter   in satisfactory position. XR CHEST PORTABLE   Final Result   Enlarging effusions and CHF.       FL ERCP BILIARY AND PANCREATIC S&I   Final Result

## 2018-11-22 NOTE — PROGRESS NOTES
CALCIUM  8.1*  8.2*  7.8*       Recent Labs      11/20/18   1304  11/21/18   0500  11/22/18   0525   WBC  48.1*  43.7*  31.6*   RBC  3.75  3.57  3.40*   HGB  10.7*  10.3*  9.8*   HCT  32.8*  31.1*  29.4*   MCV  87.5  87.1  86.5   MCH  28.5  28.9  28.8   MCHC  32.6  33.1  33.3   RDW  15.1*  14.9  14.5   PLT  95*  127*  157   MPV  10.8  11.4  10.8        Ref. Range 11/18/2018 15:20 11/19/2018 05:35 11/20/2018 13:04 11/21/2018 05:00   Albumin Latest Ref Range: 3.5 - 5.2 g/dL 2.5 (L) 2.7 (L) 3.1 (L) 3.0 (L)   Alk Phos Latest Ref Range: 35 - 104 U/L 119 (H) 323 (H) 226 (H) 228 (H)   ALT Latest Ref Range: 0 - 32 U/L 181 (H) 156 (H) 115 (H) 96 (H)   AST Latest Ref Range: 0 - 31 U/L 53 (H) 40 (H) 31 29   Bilirubin Latest Ref Range: 0.0 - 1.2 mg/dL 1.0 1.0 0.7 0.9   Bilirubin, Direct Latest Ref Range: 0.0 - 0.3 mg/dL    0.4 (H)   Bilirubin, Indirect Latest Ref Range: 0.0 - 1.0 mg/dL    0.5   Total Protein Latest Ref Range: 6.4 - 8.3 g/dL 4.9 (L) 5.2 (L) 5.5 (L) 5.5 (L)          Ref.  Range 11/14/2018 19:20 11/15/2018 04:15 11/15/2018 13:00 11/15/2018 20:21 11/16/2018 01:11 11/16/2018 06:17 11/16/2018 11:34   Lactic Acid Latest Ref Range: 0.5 - 2.2 mmol/L 4.3 (HH) 6.6 (HH) 3.9 (H) 3.6 (H) 4.1 (HH) 2.9 (H) 2.9 (H)     MRSA SCREENING CULTURE ONLY [[id number]] Collected: 11/15/18 0221   Updated: 11/16/18 1403    Specimen Type: Nose    Specimen Source: Nares     MRSA Culture Only Methicillin resistant Staph aureus not isolated   Narrative:     Source: NARES       Site: Nose&Nose             CULTURE BLOOD #1 [[id number]] Collected: 11/15/18 0415   Updated: 11/16/18 1235    Specimen Type: Blood    Specimen Source: Blood     Blood Culture, Routine 24 Hours- no growth   Narrative:     Source: BLOOD       Site: TLC               CULTURE BLOOD #2 [[id number]] (Abnormal) Collected: 11/15/18 0514   Order Status: Completed Specimen: Blood Updated: 11/18/18 1234    Culture, Blood 2 -- (A)    24 Hours- no growth   Gram stain performed from Abdomen and pelvis:   1. Decreased size of the bile duct dilatation with a biliary catheter   in satisfactory position. XR CHEST PORTABLE   Final Result   Enlarging effusions and CHF. FL ERCP BILIARY AND PANCREATIC S&I   Final Result   Provided fluoroscopic spot images demonstrate dilatation of the common   bile duct with multiple stones. There is subsequent placement of a   wire and biliary stent. XR CHEST PORTABLE   Final Result   1. Right subclavian catheter tip in appropriate position. No   pneumothorax. 2. Mild patchy left basilar airspace disease, likely atelectasis   versus pneumonia. 3. Stable emphysematous changes. Assessment:  Principal Problem:    Ascending cholangitis  Active Problems:    Intractable nausea and vomiting    Severe sepsis (HCC)    GERD (gastroesophageal reflux disease)    Lactic acidosis    Sinus tachycardia    Thrombocytopenia (HCC)    Septicemia due to gram-negative organism (HCC)    Severe protein-calorie malnutrition (HCC)  Resolved Problems:    * No resolved hospital problems. *      Plan:  1. GNR septecemia from ascending cholangitis--appreciate GI efforts with ERCP and stent placement. Completed 6 days of Zosyn; 11/21 switched to Unasyn. Diflucan added secondary to persistent leukocytosis. 2. Leukocytosis-- Despite antibiotic treatment WBC remains above 30. WBC peaked at 53.8 11/19. Since PICC line removed 11/20 (tip culture negative), WBC trending down and today 31.6. Patient started on Diflucan 11/19 per ID and switched to oral starting today. Of note,  received one dose Vanco 11/15 and then on one dose 11/19. After my discussion with pharm D, placed on Vitamin C protocol  11/15 with Solu-cortef, vitamin C and thiamine. She completed protocol yesterday, but will wean Solu-cortef 50 mg bid x 2 days then daily x 2 days. 3. Hypotension--resolved. Pressors discontinued afternoon 11/16. 4. Transaminitis--related to above process.  Trending

## 2018-11-23 LAB
ALBUMIN SERPL-MCNC: 2.8 G/DL (ref 3.5–5.2)
ALP BLD-CCNC: 180 U/L (ref 35–104)
ALT SERPL-CCNC: 59 U/L (ref 0–32)
ANION GAP SERPL CALCULATED.3IONS-SCNC: 10 MMOL/L (ref 7–16)
AST SERPL-CCNC: 27 U/L (ref 0–31)
BASOPHILS ABSOLUTE: 0 E9/L (ref 0–0.2)
BASOPHILS RELATIVE PERCENT: 0.3 % (ref 0–2)
BILIRUB SERPL-MCNC: 0.7 MG/DL (ref 0–1.2)
BUN BLDV-MCNC: 10 MG/DL (ref 8–23)
CALCIUM SERPL-MCNC: 7.3 MG/DL (ref 8.6–10.2)
CHLORIDE BLD-SCNC: 101 MMOL/L (ref 98–107)
CO2: 33 MMOL/L (ref 22–29)
CREAT SERPL-MCNC: 0.7 MG/DL (ref 0.5–1)
EOSINOPHILS ABSOLUTE: 0.18 E9/L (ref 0.05–0.5)
EOSINOPHILS RELATIVE PERCENT: 0.8 % (ref 0–6)
GFR AFRICAN AMERICAN: >60
GFR NON-AFRICAN AMERICAN: >60 ML/MIN/1.73
GLUCOSE BLD-MCNC: 84 MG/DL (ref 74–99)
HCT VFR BLD CALC: 28.1 % (ref 34–48)
HEMOGLOBIN: 9.4 G/DL (ref 11.5–15.5)
LYMPHOCYTES ABSOLUTE: 1.39 E9/L (ref 1.5–4)
LYMPHOCYTES RELATIVE PERCENT: 5.9 % (ref 20–42)
MAGNESIUM: 1.6 MG/DL (ref 1.6–2.6)
MCH RBC QN AUTO: 28.9 PG (ref 26–35)
MCHC RBC AUTO-ENTMCNC: 33.5 % (ref 32–34.5)
MCV RBC AUTO: 86.5 FL (ref 80–99.9)
METAMYELOCYTES RELATIVE PERCENT: 0.8 % (ref 0–1)
MONOCYTES ABSOLUTE: 1.16 E9/L (ref 0.1–0.95)
MONOCYTES RELATIVE PERCENT: 5 % (ref 2–12)
MYELOCYTE PERCENT: 1.7 % (ref 0–0)
NEUTROPHILS ABSOLUTE: 20.33 E9/L (ref 1.8–7.3)
NEUTROPHILS RELATIVE PERCENT: 85.7 % (ref 43–80)
PDW BLD-RTO: 14.6 FL (ref 11.5–15)
PHOSPHORUS: 2.1 MG/DL (ref 2.5–4.5)
PLATELET # BLD: 172 E9/L (ref 130–450)
PMV BLD AUTO: 11 FL (ref 7–12)
POIKILOCYTES: ABNORMAL
POLYCHROMASIA: ABNORMAL
POTASSIUM SERPL-SCNC: 2.2 MMOL/L (ref 3.5–5)
POTASSIUM SERPL-SCNC: 3.2 MMOL/L (ref 3.5–5)
RBC # BLD: 3.25 E12/L (ref 3.5–5.5)
SCHISTOCYTES: ABNORMAL
SODIUM BLD-SCNC: 144 MMOL/L (ref 132–146)
TOTAL PROTEIN: 4.8 G/DL (ref 6.4–8.3)
TOXIC GRANULATION: ABNORMAL
WBC # BLD: 23.1 E9/L (ref 4.5–11.5)

## 2018-11-23 PROCEDURE — 97110 THERAPEUTIC EXERCISES: CPT

## 2018-11-23 PROCEDURE — 6360000002 HC RX W HCPCS: Performed by: SPECIALIST

## 2018-11-23 PROCEDURE — 80053 COMPREHEN METABOLIC PANEL: CPT

## 2018-11-23 PROCEDURE — 36592 COLLECT BLOOD FROM PICC: CPT

## 2018-11-23 PROCEDURE — 2580000003 HC RX 258: Performed by: INTERNAL MEDICINE

## 2018-11-23 PROCEDURE — 6360000002 HC RX W HCPCS: Performed by: HOSPITALIST

## 2018-11-23 PROCEDURE — C9113 INJ PANTOPRAZOLE SODIUM, VIA: HCPCS | Performed by: HOSPITALIST

## 2018-11-23 PROCEDURE — 99232 SBSQ HOSP IP/OBS MODERATE 35: CPT | Performed by: INTERNAL MEDICINE

## 2018-11-23 PROCEDURE — 83735 ASSAY OF MAGNESIUM: CPT

## 2018-11-23 PROCEDURE — 85025 COMPLETE CBC W/AUTO DIFF WBC: CPT

## 2018-11-23 PROCEDURE — 1200000000 HC SEMI PRIVATE

## 2018-11-23 PROCEDURE — 6360000002 HC RX W HCPCS: Performed by: INTERNAL MEDICINE

## 2018-11-23 PROCEDURE — 2500000003 HC RX 250 WO HCPCS: Performed by: INTERNAL MEDICINE

## 2018-11-23 PROCEDURE — 97530 THERAPEUTIC ACTIVITIES: CPT

## 2018-11-23 PROCEDURE — 2580000003 HC RX 258: Performed by: SPECIALIST

## 2018-11-23 PROCEDURE — 36415 COLL VENOUS BLD VENIPUNCTURE: CPT

## 2018-11-23 PROCEDURE — 84132 ASSAY OF SERUM POTASSIUM: CPT

## 2018-11-23 PROCEDURE — 2580000003 HC RX 258: Performed by: HOSPITALIST

## 2018-11-23 PROCEDURE — 84100 ASSAY OF PHOSPHORUS: CPT

## 2018-11-23 RX ORDER — FLUCONAZOLE 2 MG/ML
200 INJECTION, SOLUTION INTRAVENOUS EVERY 24 HOURS
Status: DISCONTINUED | OUTPATIENT
Start: 2018-11-23 | End: 2018-11-26 | Stop reason: HOSPADM

## 2018-11-23 RX ORDER — MAGNESIUM SULFATE IN WATER 40 MG/ML
2 INJECTION, SOLUTION INTRAVENOUS ONCE
Status: COMPLETED | OUTPATIENT
Start: 2018-11-23 | End: 2018-11-23

## 2018-11-23 RX ADMIN — HYDROCORTISONE SODIUM SUCCINATE 50 MG: 100 INJECTION, POWDER, FOR SOLUTION INTRAMUSCULAR; INTRAVENOUS at 08:00

## 2018-11-23 RX ADMIN — FLUCONAZOLE, SODIUM CHLORIDE 200 MG: 2 INJECTION INTRAVENOUS at 13:04

## 2018-11-23 RX ADMIN — POTASSIUM CHLORIDE 20 MEQ: 29.8 INJECTION, SOLUTION INTRAVENOUS at 22:39

## 2018-11-23 RX ADMIN — POTASSIUM CHLORIDE 20 MEQ: 29.8 INJECTION, SOLUTION INTRAVENOUS at 23:23

## 2018-11-23 RX ADMIN — POTASSIUM CHLORIDE 20 MEQ: 29.8 INJECTION, SOLUTION INTRAVENOUS at 07:17

## 2018-11-23 RX ADMIN — POTASSIUM PHOSPHATE, MONOBASIC AND POTASSIUM PHOSPHATE, DIBASIC 30 MMOL: 224; 236 INJECTION, SOLUTION INTRAVENOUS at 09:34

## 2018-11-23 RX ADMIN — SODIUM CHLORIDE 3 G: 900 INJECTION INTRAVENOUS at 16:19

## 2018-11-23 RX ADMIN — Medication 10 ML: at 22:11

## 2018-11-23 RX ADMIN — SODIUM CHLORIDE 3 G: 900 INJECTION INTRAVENOUS at 11:34

## 2018-11-23 RX ADMIN — MAGNESIUM SULFATE IN WATER 2 G: 40 INJECTION, SOLUTION INTRAVENOUS at 09:38

## 2018-11-23 RX ADMIN — POTASSIUM CHLORIDE 20 MEQ: 29.8 INJECTION, SOLUTION INTRAVENOUS at 08:16

## 2018-11-23 RX ADMIN — PANTOPRAZOLE SODIUM 40 MG: 40 INJECTION, POWDER, FOR SOLUTION INTRAVENOUS at 08:00

## 2018-11-23 RX ADMIN — SODIUM CHLORIDE 3 G: 900 INJECTION INTRAVENOUS at 22:05

## 2018-11-23 RX ADMIN — SODIUM CHLORIDE 3 G: 900 INJECTION INTRAVENOUS at 04:28

## 2018-11-23 RX ADMIN — POTASSIUM CHLORIDE 20 MEQ: 29.8 INJECTION, SOLUTION INTRAVENOUS at 06:49

## 2018-11-23 RX ADMIN — Medication 10 ML: at 07:59

## 2018-11-23 RX ADMIN — Medication 10 ML: at 08:01

## 2018-11-23 ASSESSMENT — PAIN SCALES - GENERAL
PAINLEVEL_OUTOF10: 0
PAINLEVEL_OUTOF10: 0

## 2018-11-23 NOTE — PROGRESS NOTES
dilatation with a biliary catheter   in satisfactory position. XR CHEST PORTABLE   Final Result   Enlarging effusions and CHF. FL ERCP BILIARY AND PANCREATIC S&I   Final Result   Provided fluoroscopic spot images demonstrate dilatation of the common   bile duct with multiple stones. There is subsequent placement of a   wire and biliary stent. XR CHEST PORTABLE   Final Result   1. Right subclavian catheter tip in appropriate position. No   pneumothorax. 2. Mild patchy left basilar airspace disease, likely atelectasis   versus pneumonia. 3. Stable emphysematous changes. Assessment:  Principal Problem:    Ascending cholangitis  Active Problems:    Intractable nausea and vomiting    Severe sepsis (HCC)    GERD (gastroesophageal reflux disease)    Lactic acidosis    Sinus tachycardia    Thrombocytopenia (HCC)    Septicemia due to gram-negative organism (HCC)    Severe protein-calorie malnutrition (HCC)  Resolved Problems:    * No resolved hospital problems. *      Plan:  1. GNR septecemia from ascending cholangitis--appreciate GI efforts with ERCP and stent placement  Completed 6 days of Zosyn; 11/21 switched to Unasyn. day #3. Diflucan added secondary to persistent leukocytosis; day #4 treatment but switch oral back to IV since will not take pill. 2. Leukocytosis--WBC peaked at 53.8 11/19. Since PICC line removed 11/20 (tip culture negative), WBC trending down and today at 23.1. Patient started on Diflucan 11/19 per ID  Of note,  received one dose Vanco 11/15 and then on one dose 11/19. After my discussion with pharm D, placed on Vitamin C protocol  11/15 with Solu-cortef, vitamin C and thiamine. She completed protocol yesterday, but will wean Solu-cortef 50 mg bid x 2 days then daily x 2 days. 3. Hypotension--resolved. Pressors discontinued afternoon 11/16. 4. Transaminitis--related to above process. Trending down. 5. Sinus tachycardia--secondary to above.  Continue to monitor  6. Intractable N/V(resolved)-likely from cholangitis. 7. Lactic acidosis- resolved with last 3 values under 2. Lactic peaked at 6.6. IVF discontinued altogether Saturday. 8. Hypokalemia/hypomagnesemia/hypophosphatemia--K-phos IV ordered but did not want to take oral supplement. Increase potassium rich foods but the only thing she can tolerate is potatoes. She can not eat fruits because it aggravates her IBS. 9. Thrombocytopenia(resolved- Likely from sepsis but improving daily since off Lovenox. Last dose of Lovenox on 11/17. Last two day platelets wnl. 10. Chronic diarrhea--negative stool culture. C diff negative. Patient follow strict diet at home. 11. Disposition--currently on telemetry. Patient has improved to point that SNF is more appropriate. Will cancel LTAC referral and have  place SNF. Unfortunately, since insurance company closed holiday weekend, will be unable to pursue precertification until Monday. NOTE: This report was transcribed using voice recognition software.  Every effort was made to ensure accuracy; however, inadvertent computerized transcription errors may be present.     Electronically signed by Kirill Palm MD on 11/23/2018 at 9:48 AM

## 2018-11-23 NOTE — PLAN OF CARE
Patient seen and examined. No new complaints including no abdominal pain. Tolerating diet/having bowel movements  A/P  1. Ascending cholangitis -  s/p ERCP with stent placement 11/15/18   2. Leukocytosis - improved - ATBs per ID  3. Normocytic anemia  - stable H&H  - further evaluation as outpatient     No immediate plans for intervention from GI POV. Will see PRN in the hospital - please, call with questions/concerns. Follow-up in the office in 2 weeks after discharge - call for appointment at [phone]. Thank you for the opportunity to participate in the care of Mrs. Barrera.     Leopoldo Seaman, MD  11/23/2018  1:16 PM

## 2018-11-23 NOTE — PROGRESS NOTES
Darell Barrera 1935    SUBJECTIVE:    Reports occasional cough. Has been refusing her Diflucan. OBJECTIVE  Physical Exam:  VITALS:  /67   Pulse 84   Temp 98.7 °F (37.1 °C) (Oral)   Resp 18   Ht 4' 11\" (1.499 m)   Wt 123 lb 12.8 oz (56.2 kg)   SpO2 94%   BMI 25.00 kg/m²   ENT:  oropharynx clear  NECK:  No  lymphadenopathy. HEMATOLOGIC/LYMPHATICS:  No abnormal lymphadenopathy. LUNGS:  Clear to auscultation   CARDIOVASCULAR:  Regular rate and rhythm. No clicks, murmurs, rubs or gallops. ABDOMEN:  Soft, nontender. No ascites. No mass or organomegaly. NEUROLOGIC:  No focal deficits. SKIN:  No rash. EXTREMITIES: LUE PICC line. No edema.     DIAGNOSTIC DATA  Labs:    Lab Results   Component Value Date    WBC 23.1 (H) 11/23/2018    HGB 9.4 (L) 11/23/2018    HCT 28.1 (L) 11/23/2018    MCV 86.5 11/23/2018     11/23/2018     No results found for: CEA  Recent Labs      11/20/18   1304  11/21/18   0500  11/22/18   0525  11/23/18   0530   NA  148*  148*   --   144   CO2  26  26   --   33*   PHOS  1.8*  4.0  3.0  2.1*   BUN  22  21   --   10   CREATININE  1.1*  1.0   --   0.7     No results found for: FERRITIN  Lab Results   Component Value Date    ALT 59 (H) 11/23/2018    AST 27 11/23/2018    ALKPHOS 180 (H) 11/23/2018    BILITOT 0.7 11/23/2018     Lab Results   Component Value Date    LABALBU 2.8 (L) 11/23/2018         Current Facility-Administered Medications   Medication Dose Route Frequency Provider Last Rate Last Dose    potassium phosphate 30 mmol in dextrose 5 % 250 mL IVPB  30 mmol Intravenous Once Mat Hickey MD 62.5 mL/hr at 11/23/18 0934 30 mmol at 11/23/18 0934    fluconazole (DIFLUCAN) 200 mg in 0.9 % NaCl 100 mL premix IVPB  200 mg Intravenous Q24H Mat Hickey MD        ampicillin-sulbactam (UNASYN) 3 g ivpb minibag  3 g Intravenous Q6H Linda Landaverde  mL/hr at 11/23/18 1134 3 g at 11/23/18 1134    heparin flush 100 UNIT/ML injection 100 Units

## 2018-11-24 LAB
ALBUMIN SERPL-MCNC: 2.7 G/DL (ref 3.5–5.2)
ALP BLD-CCNC: 146 U/L (ref 35–104)
ALT SERPL-CCNC: 49 U/L (ref 0–32)
ANION GAP SERPL CALCULATED.3IONS-SCNC: 7 MMOL/L (ref 7–16)
AST SERPL-CCNC: 26 U/L (ref 0–31)
BASOPHILS ABSOLUTE: 0 E9/L (ref 0–0.2)
BASOPHILS RELATIVE PERCENT: 0 % (ref 0–2)
BILIRUB SERPL-MCNC: 0.6 MG/DL (ref 0–1.2)
BUN BLDV-MCNC: 7 MG/DL (ref 8–23)
CALCIUM SERPL-MCNC: 7.5 MG/DL (ref 8.6–10.2)
CHLORIDE BLD-SCNC: 106 MMOL/L (ref 98–107)
CO2: 32 MMOL/L (ref 22–29)
CREAT SERPL-MCNC: 0.8 MG/DL (ref 0.5–1)
EOSINOPHILS ABSOLUTE: 0.19 E9/L (ref 0.05–0.5)
EOSINOPHILS RELATIVE PERCENT: 1 % (ref 0–6)
GFR AFRICAN AMERICAN: >60
GFR NON-AFRICAN AMERICAN: >60 ML/MIN/1.73
GLUCOSE BLD-MCNC: 83 MG/DL (ref 74–99)
HCT VFR BLD CALC: 28.3 % (ref 34–48)
HEMOGLOBIN: 9.2 G/DL (ref 11.5–15.5)
LYMPHOCYTES ABSOLUTE: 1.85 E9/L (ref 1.5–4)
LYMPHOCYTES RELATIVE PERCENT: 10 % (ref 20–42)
MAGNESIUM: 2.2 MG/DL (ref 1.6–2.6)
MCH RBC QN AUTO: 28.7 PG (ref 26–35)
MCHC RBC AUTO-ENTMCNC: 32.5 % (ref 32–34.5)
MCV RBC AUTO: 88.2 FL (ref 80–99.9)
METAMYELOCYTES RELATIVE PERCENT: 1 % (ref 0–1)
MONOCYTES ABSOLUTE: 1.3 E9/L (ref 0.1–0.95)
MONOCYTES RELATIVE PERCENT: 7 % (ref 2–12)
MYELOCYTE PERCENT: 1 % (ref 0–0)
NEUTROPHILS ABSOLUTE: 15.17 E9/L (ref 1.8–7.3)
NEUTROPHILS RELATIVE PERCENT: 80 % (ref 43–80)
PDW BLD-RTO: 15 FL (ref 11.5–15)
PHOSPHORUS: 3 MG/DL (ref 2.5–4.5)
PLATELET # BLD: 181 E9/L (ref 130–450)
PMV BLD AUTO: 10.6 FL (ref 7–12)
POTASSIUM SERPL-SCNC: 3.3 MMOL/L (ref 3.5–5)
POTASSIUM SERPL-SCNC: 4 MMOL/L (ref 3.5–5)
RBC # BLD: 3.21 E12/L (ref 3.5–5.5)
SODIUM BLD-SCNC: 145 MMOL/L (ref 132–146)
TOTAL PROTEIN: 4.8 G/DL (ref 6.4–8.3)
WBC # BLD: 18.5 E9/L (ref 4.5–11.5)

## 2018-11-24 PROCEDURE — 6360000002 HC RX W HCPCS: Performed by: RADIOLOGY

## 2018-11-24 PROCEDURE — 99232 SBSQ HOSP IP/OBS MODERATE 35: CPT | Performed by: INTERNAL MEDICINE

## 2018-11-24 PROCEDURE — 36415 COLL VENOUS BLD VENIPUNCTURE: CPT

## 2018-11-24 PROCEDURE — 84100 ASSAY OF PHOSPHORUS: CPT

## 2018-11-24 PROCEDURE — 6360000002 HC RX W HCPCS: Performed by: SPECIALIST

## 2018-11-24 PROCEDURE — 36592 COLLECT BLOOD FROM PICC: CPT

## 2018-11-24 PROCEDURE — 85025 COMPLETE CBC W/AUTO DIFF WBC: CPT

## 2018-11-24 PROCEDURE — 80053 COMPREHEN METABOLIC PANEL: CPT

## 2018-11-24 PROCEDURE — 2580000003 HC RX 258: Performed by: HOSPITALIST

## 2018-11-24 PROCEDURE — 6360000002 HC RX W HCPCS: Performed by: HOSPITALIST

## 2018-11-24 PROCEDURE — 6360000002 HC RX W HCPCS: Performed by: INTERNAL MEDICINE

## 2018-11-24 PROCEDURE — 84132 ASSAY OF SERUM POTASSIUM: CPT

## 2018-11-24 PROCEDURE — 2580000003 HC RX 258: Performed by: INTERNAL MEDICINE

## 2018-11-24 PROCEDURE — C9113 INJ PANTOPRAZOLE SODIUM, VIA: HCPCS | Performed by: HOSPITALIST

## 2018-11-24 PROCEDURE — 83735 ASSAY OF MAGNESIUM: CPT

## 2018-11-24 PROCEDURE — 1200000000 HC SEMI PRIVATE

## 2018-11-24 PROCEDURE — 2580000003 HC RX 258: Performed by: SPECIALIST

## 2018-11-24 RX ADMIN — Medication 10 ML: at 16:31

## 2018-11-24 RX ADMIN — SODIUM CHLORIDE 3 G: 900 INJECTION INTRAVENOUS at 22:01

## 2018-11-24 RX ADMIN — POTASSIUM CHLORIDE 20 MEQ: 29.8 INJECTION, SOLUTION INTRAVENOUS at 03:20

## 2018-11-24 RX ADMIN — Medication 10 ML: at 09:13

## 2018-11-24 RX ADMIN — POTASSIUM CHLORIDE 20 MEQ: 29.8 INJECTION, SOLUTION INTRAVENOUS at 04:08

## 2018-11-24 RX ADMIN — Medication 100 UNITS: at 16:31

## 2018-11-24 RX ADMIN — Medication 10 ML: at 22:02

## 2018-11-24 RX ADMIN — FLUCONAZOLE, SODIUM CHLORIDE 200 MG: 2 INJECTION INTRAVENOUS at 11:51

## 2018-11-24 RX ADMIN — PANTOPRAZOLE SODIUM 40 MG: 40 INJECTION, POWDER, FOR SOLUTION INTRAVENOUS at 09:12

## 2018-11-24 RX ADMIN — SODIUM CHLORIDE 3 G: 900 INJECTION INTRAVENOUS at 15:43

## 2018-11-24 RX ADMIN — SODIUM CHLORIDE 3 G: 900 INJECTION INTRAVENOUS at 09:12

## 2018-11-24 RX ADMIN — Medication 10 ML: at 09:12

## 2018-11-24 RX ADMIN — SODIUM CHLORIDE 3 G: 900 INJECTION INTRAVENOUS at 02:45

## 2018-11-24 ASSESSMENT — PAIN SCALES - GENERAL
PAINLEVEL_OUTOF10: 0

## 2018-11-24 NOTE — PROGRESS NOTES
Intake              740 ml   Output              400 ml   Net              340 ml     No intake/output data recorded. Weight change: -8 lb (-3.629 kg)  LABS:    Recent Labs      11/22/18   0525  11/23/18   0530  11/24/18   0545   WBC  31.6*  23.1*  18.5*   HGB  9.8*  9.4*  9.2*   HCT  29.4*  28.1*  28.3*   MCV  86.5  86.5  88.2   PLT  157  172  181     Recent Labs      11/23/18   0530   11/24/18   0545   NA  144   --   145   K  2.2*   < >  4.0   CL  101   --   106   CO2  33*   --   32*   BUN  10   --   7*   CREATININE  0.7   --   0.8   GFRAA  >60   --   >60   LABGLOM  >60   --   >60   GLUCOSE  84   --   83   PROT  4.8*   --   4.8*   LABALBU  2.8*   --   2.7*   CALCIUM  7.3*   --   7.5*   BILITOT  0.7   --   0.6   ALKPHOS  180*   --   146*   AST  27   --   26   ALT  59*   --   49*    < > = values in this interval not displayed. MICROBIOLOGY:   11/15/18-Blood cx GNR- anaerobic GNR    RADIOLOGY:  IR ULTRASOUND GUIDANCE VASCULAR ACCESS   Final Result   Ultrasound guidance was provided during placement of a   PICC line. IR FLUORO GUIDED CVA DEVICE PLACEMENT   Final Result   Successful placement of a 5 [place] double-lumen Power   PICC line using ultrasound guidance via the left basilic  vein. RADIOLOGY REPORT   Final Result      CT CHEST WO CONTRAST   Final Result      Chest:   1. Bilateral effusions with parenchymal opacities, likely infectious. Abdomen and pelvis:   1. Decreased size of the bile duct dilatation with a biliary catheter   in satisfactory position. CT ABDOMEN PELVIS WO CONTRAST Additional Contrast? Oral   Final Result      Chest:   1. Bilateral effusions with parenchymal opacities, likely infectious. Abdomen and pelvis:   1. Decreased size of the bile duct dilatation with a biliary catheter   in satisfactory position. XR CHEST PORTABLE   Final Result   Enlarging effusions and CHF.       FL ERCP BILIARY AND PANCREATIC S&I   Final Result   Provided fluoroscopic spot

## 2018-11-25 LAB
ALBUMIN SERPL-MCNC: 2.7 G/DL (ref 3.5–5.2)
ALP BLD-CCNC: 156 U/L (ref 35–104)
ALT SERPL-CCNC: 40 U/L (ref 0–32)
ANION GAP SERPL CALCULATED.3IONS-SCNC: 9 MMOL/L (ref 7–16)
ANISOCYTOSIS: ABNORMAL
AST SERPL-CCNC: 21 U/L (ref 0–31)
BASOPHILS ABSOLUTE: 0 E9/L (ref 0–0.2)
BASOPHILS RELATIVE PERCENT: 0.1 % (ref 0–2)
BILIRUB SERPL-MCNC: 0.7 MG/DL (ref 0–1.2)
BUN BLDV-MCNC: 6 MG/DL (ref 8–23)
CALCIUM SERPL-MCNC: 7.9 MG/DL (ref 8.6–10.2)
CHLORIDE BLD-SCNC: 103 MMOL/L (ref 98–107)
CO2: 29 MMOL/L (ref 22–29)
CREAT SERPL-MCNC: 0.8 MG/DL (ref 0.5–1)
EOSINOPHILS ABSOLUTE: 0.12 E9/L (ref 0.05–0.5)
EOSINOPHILS RELATIVE PERCENT: 0.9 % (ref 0–6)
GFR AFRICAN AMERICAN: >60
GFR NON-AFRICAN AMERICAN: >60 ML/MIN/1.73
GLUCOSE BLD-MCNC: 95 MG/DL (ref 74–99)
HCT VFR BLD CALC: 26.4 % (ref 34–48)
HEMOGLOBIN: 8.6 G/DL (ref 11.5–15.5)
LYMPHOCYTES ABSOLUTE: 0.69 E9/L (ref 1.5–4)
LYMPHOCYTES RELATIVE PERCENT: 5.2 % (ref 20–42)
MAGNESIUM: 2 MG/DL (ref 1.6–2.6)
MCH RBC QN AUTO: 29 PG (ref 26–35)
MCHC RBC AUTO-ENTMCNC: 32.6 % (ref 32–34.5)
MCV RBC AUTO: 88.9 FL (ref 80–99.9)
MONOCYTES ABSOLUTE: 0.14 E9/L (ref 0.1–0.95)
MONOCYTES RELATIVE PERCENT: 0.9 % (ref 2–12)
MYELOCYTE PERCENT: 0.9 % (ref 0–0)
NEUTROPHILS ABSOLUTE: 12.74 E9/L (ref 1.8–7.3)
NEUTROPHILS RELATIVE PERCENT: 92.2 % (ref 43–80)
OVALOCYTES: ABNORMAL
PDW BLD-RTO: 15.5 FL (ref 11.5–15)
PHOSPHORUS: 2.9 MG/DL (ref 2.5–4.5)
PLATELET # BLD: 168 E9/L (ref 130–450)
PMV BLD AUTO: 10.8 FL (ref 7–12)
POIKILOCYTES: ABNORMAL
POLYCHROMASIA: ABNORMAL
POTASSIUM SERPL-SCNC: 3.6 MMOL/L (ref 3.5–5)
RBC # BLD: 2.97 E12/L (ref 3.5–5.5)
SODIUM BLD-SCNC: 141 MMOL/L (ref 132–146)
TOTAL PROTEIN: 4.9 G/DL (ref 6.4–8.3)
TOXIC GRANULATION: ABNORMAL
WBC # BLD: 13.7 E9/L (ref 4.5–11.5)

## 2018-11-25 PROCEDURE — 2580000003 HC RX 258: Performed by: HOSPITALIST

## 2018-11-25 PROCEDURE — 85025 COMPLETE CBC W/AUTO DIFF WBC: CPT

## 2018-11-25 PROCEDURE — 97110 THERAPEUTIC EXERCISES: CPT

## 2018-11-25 PROCEDURE — 2580000003 HC RX 258: Performed by: SPECIALIST

## 2018-11-25 PROCEDURE — 2580000003 HC RX 258: Performed by: INTERNAL MEDICINE

## 2018-11-25 PROCEDURE — 80053 COMPREHEN METABOLIC PANEL: CPT

## 2018-11-25 PROCEDURE — 6360000002 HC RX W HCPCS: Performed by: SPECIALIST

## 2018-11-25 PROCEDURE — 36592 COLLECT BLOOD FROM PICC: CPT

## 2018-11-25 PROCEDURE — 99232 SBSQ HOSP IP/OBS MODERATE 35: CPT | Performed by: INTERNAL MEDICINE

## 2018-11-25 PROCEDURE — 6360000002 HC RX W HCPCS: Performed by: INTERNAL MEDICINE

## 2018-11-25 PROCEDURE — C9113 INJ PANTOPRAZOLE SODIUM, VIA: HCPCS | Performed by: HOSPITALIST

## 2018-11-25 PROCEDURE — 84100 ASSAY OF PHOSPHORUS: CPT

## 2018-11-25 PROCEDURE — 6360000002 HC RX W HCPCS: Performed by: HOSPITALIST

## 2018-11-25 PROCEDURE — 6360000002 HC RX W HCPCS: Performed by: RADIOLOGY

## 2018-11-25 PROCEDURE — 36415 COLL VENOUS BLD VENIPUNCTURE: CPT

## 2018-11-25 PROCEDURE — 97116 GAIT TRAINING THERAPY: CPT

## 2018-11-25 PROCEDURE — 83735 ASSAY OF MAGNESIUM: CPT

## 2018-11-25 PROCEDURE — 1200000000 HC SEMI PRIVATE

## 2018-11-25 RX ADMIN — FLUCONAZOLE, SODIUM CHLORIDE 200 MG: 2 INJECTION INTRAVENOUS at 12:01

## 2018-11-25 RX ADMIN — Medication 10 ML: at 08:27

## 2018-11-25 RX ADMIN — SODIUM CHLORIDE 3 G: 900 INJECTION INTRAVENOUS at 03:54

## 2018-11-25 RX ADMIN — Medication 100 UNITS: at 09:11

## 2018-11-25 RX ADMIN — PANTOPRAZOLE SODIUM 40 MG: 40 INJECTION, POWDER, FOR SOLUTION INTRAVENOUS at 08:26

## 2018-11-25 RX ADMIN — Medication 10 ML: at 21:11

## 2018-11-25 RX ADMIN — Medication 10 ML: at 08:26

## 2018-11-25 RX ADMIN — SODIUM CHLORIDE 3 G: 900 INJECTION INTRAVENOUS at 08:26

## 2018-11-25 RX ADMIN — SODIUM CHLORIDE 3 G: 900 INJECTION INTRAVENOUS at 21:11

## 2018-11-25 RX ADMIN — Medication 100 UNITS: at 03:54

## 2018-11-25 RX ADMIN — SODIUM CHLORIDE 3 G: 900 INJECTION INTRAVENOUS at 16:21

## 2018-11-25 RX ADMIN — Medication 10 ML: at 03:54

## 2018-11-25 ASSESSMENT — PAIN SCALES - GENERAL
PAINLEVEL_OUTOF10: 0
PAINLEVEL_OUTOF10: 0

## 2018-11-25 NOTE — PROGRESS NOTES
11/22/18   0525  11/23/18   0530  11/24/18   0545   WBC  31.6*  23.1*  18.5*   RBC  3.40*  3.25*  3.21*   HGB  9.8*  9.4*  9.2*   HCT  29.4*  28.1*  28.3*   MCV  86.5  86.5  88.2   MCH  28.8  28.9  28.7   MCHC  33.3  33.5  32.5   RDW  14.5  14.6  15.0   PLT  157  172  181   MPV  10.8  11.0  10.6        Ref.  Range 11/18/2018 15:20 11/19/2018 05:35 11/20/2018 13:04 11/21/2018 05:00 11/23/2018 05:30   Albumin Latest Ref Range: 3.5 - 5.2 g/dL 2.5 (L) 2.7 (L) 3.1 (L) 3.0 (L) 2.8 (L)   Alk Phos Latest Ref Range: 35 - 104 U/L 119 (H) 323 (H) 226 (H) 228 (H) 180 (H)   ALT Latest Ref Range: 0 - 32 U/L 181 (H) 156 (H) 115 (H) 96 (H) 59 (H)   AST Latest Ref Range: 0 - 31 U/L 53 (H) 40 (H) 31 29 27   Bilirubin Latest Ref Range: 0.0 - 1.2 mg/dL 1.0 1.0 0.7 0.9 0.7   Bilirubin, Direct Latest Ref Range: 0.0 - 0.3 mg/dL    0.4 (H)    Bilirubin, Indirect Latest Ref Range: 0.0 - 1.0 mg/dL    0.5    Total Protein Latest Ref Range: 6.4 - 8.3 g/dL 4.9 (L) 5.2 (L) 5.5 (L) 5.5 (L) 4.8 (L)         MRSA SCREENING CULTURE ONLY [[id number]] Collected: 11/15/18 0221   Updated: 11/16/18 1403    Specimen Type: Nose    Specimen Source: Nares     MRSA Culture Only Methicillin resistant Staph aureus not isolated   Narrative:     Source: NARES       Site: Nose&Nose             CULTURE BLOOD #1 [[id number]] Collected: 11/15/18 0415   Updated: 11/16/18 1235    Specimen Type: Blood    Specimen Source: Blood     Blood Culture, Routine 24 Hours- no growth   Narrative:     Source: BLOOD       Site: TLC               CULTURE BLOOD #2 [[id number]] (Abnormal) Collected: 11/15/18 0514   Order Status: Completed Specimen: Blood Updated: 11/18/18 1234    Culture, Blood 2 -- (A)    24 Hours- no growth   Gram stain performed from blood culture bottle media   Gram negative rods     Organism Anaerobic gram negative olivia (A)    Culture, Blood 2 Identification and sensitivity to follow   Narrative: Florina Weir tel. [phone],  Microbiology results called to and read back by CORRIE-Edmond Samuels, 11/17/2018  08:16, by Howard Hastings Culture [[id number]] Collected: 11/20/18 1033   Order Status: Completed Specimen: Catheter Site from Catheter Tip Updated: 11/22/18 0628    Culture Catheter Tip Growth not present   Narrative:     Source: CTHTP       Site: Catheter Site&Catheter Site             Clostridium difficile EIA [[id number]] Collected: 11/21/18 0200   Order Status: Completed Specimen: Stool from Stool Updated: 11/21/18 1348    C difficile Toxin, EIA --    C. Difficile Toxin A and/or B NOT detected   Normal Range: Not detected    Narrative:     Source: STOOL       Site: Stool&Stool             Culture Stool [[id number]] Collected: 11/18/18 2025   Order Status: Completed Specimen: Stool Updated: 11/21/18 0622    Culture, Stool --    Salmonella, Shigella, Campylobacter, Yersinia,   Aeromonas or E. Coli 0157:H7 not isolated    Narrative:     Source: STOOL       Site:             Radiology:     IR ULTRASOUND GUIDANCE VASCULAR ACCESS   Final Result   Ultrasound guidance was provided during placement of a   PICC line. IR FLUORO GUIDED CVA DEVICE PLACEMENT   Final Result   Successful placement of a 5 [place]ese double-lumen Power   PICC line using ultrasound guidance via the left basilic  vein. RADIOLOGY REPORT   Final Result      CT CHEST WO CONTRAST   Final Result      Chest:   1. Bilateral effusions with parenchymal opacities, likely infectious. Abdomen and pelvis:   1. Decreased size of the bile duct dilatation with a biliary catheter   in satisfactory position. CT ABDOMEN PELVIS WO CONTRAST Additional Contrast? Oral   Final Result      Chest:   1. Bilateral effusions with parenchymal opacities, likely infectious. Abdomen and pelvis:   1. Decreased size of the bile duct dilatation with a biliary catheter   in satisfactory position. XR CHEST PORTABLE   Final Result   Enlarging effusions and CHF.       FL ERCP BILIARY AND PANCREATIC S&I   Final Result

## 2018-11-25 NOTE — PROGRESS NOTES
0 ml   Net              600 ml     No intake/output data recorded. Weight change: -5 lb 9.6 oz (-2.54 kg)  LABS:    Recent Labs      11/23/18   0530  11/24/18   0545  11/25/18   0435   WBC  23.1*  18.5*  13.7*   HGB  9.4*  9.2*  8.6*   HCT  28.1*  28.3*  26.4*   MCV  86.5  88.2  88.9   PLT  172  181  168     Recent Labs      11/23/18   0530   11/24/18   0545  11/25/18   0435   NA  144   --   145  141   K  2.2*   < >  4.0  3.6   CL  101   --   106  103   CO2  33*   --   32*  29   BUN  10   --   7*  6*   CREATININE  0.7   --   0.8  0.8   GFRAA  >60   --   >60  >60   LABGLOM  >60   --   >60  >60   GLUCOSE  84   --   83  95   PROT  4.8*   --   4.8*  4.9*   LABALBU  2.8*   --   2.7*  2.7*   CALCIUM  7.3*   --   7.5*  7.9*   BILITOT  0.7   --   0.6  0.7   ALKPHOS  180*   --   146*  156*   AST  27   --   26  21   ALT  59*   --   49*  40*    < > = values in this interval not displayed. MICROBIOLOGY:   11/15/18-Blood cx- Veillonella parvula and anaerobic GNR    RADIOLOGY:  IR ULTRASOUND GUIDANCE VASCULAR ACCESS   Final Result   Ultrasound guidance was provided during placement of a   PICC line. IR FLUORO GUIDED CVA DEVICE PLACEMENT   Final Result   Successful placement of a 5 Romansh double-lumen Power   PICC line using ultrasound guidance via the left basilic  vein. RADIOLOGY REPORT   Final Result      CT CHEST WO CONTRAST   Final Result      Chest:   1. Bilateral effusions with parenchymal opacities, likely infectious. Abdomen and pelvis:   1. Decreased size of the bile duct dilatation with a biliary catheter   in satisfactory position. CT ABDOMEN PELVIS WO CONTRAST Additional Contrast? Oral   Final Result      Chest:   1. Bilateral effusions with parenchymal opacities, likely infectious. Abdomen and pelvis:   1. Decreased size of the bile duct dilatation with a biliary catheter   in satisfactory position. XR CHEST PORTABLE   Final Result   Enlarging effusions and CHF.       FL

## 2018-11-25 NOTE — PROGRESS NOTES
ALEJANDRINA Newsome [facility] Hospitalist    Progress Note      Admitting Date and Time: 11/15/2018  2:16 AM  Admit Dx: Ascending cholangitis [K83.09]  Ascending cholangitis [K83.09]    Subjective:    Patient watching [place] vs [employer] are beating my [employer] butts\". Per RN: No issues.  fluconazole  200 mg Intravenous Q24H    ampicillin-sulbactam  3 g Intravenous Q6H    pneumococcal 13-valent conjugate  0.5 mL Intramuscular Once    sodium chloride flush  10 mL Intravenous 2 times per day    pantoprazole  40 mg Intravenous Daily    sodium chloride (PF)  10 mL Intravenous 2 times per day       heparin flush 100 Units PRN   magnesium hydroxide 30 mL Daily PRN   ondansetron 4 mg Q6H PRN   sodium chloride flush 10 mL PRN   promethazine 12.5 mg Q4H PRN   fentanNYL 25 mcg Q2H PRN   sodium chloride (PF) 10 mL PRN        Objective:    BP (!) 146/74   Pulse 103   Temp 98.1 °F (36.7 °C) (Oral)   Resp 16   Ht 4' 11\" (1.499 m)   Wt 110 lb 3.2 oz (50 kg)   SpO2 95%   BMI 22.26 kg/m²   General Appearance: alert and in no acute distress  Skin: warm and dry  Head: normocephalic and atraumatic  Eyes: pupils equal, round, and reactive to light, extraocular eye movements intact  Neck: neck supple and non tender without mass   Pulmonary/Chest: right upper chest bandage in place Coarse breath sounds. Cardiovascular: normal rate, normal S1 and S2 and no carotid bruits  Abdomen: soft, non-tender, non-distended, normal bowel sounds, [place] in place.   Extremities: LUE PICC line  Neurologic: no cranial nerve deficit and speech normal      Recent Labs      11/23/18   0530   11/24/18   0200  11/24/18   0545  11/25/18   0435   NA  144   --    --   145  141   K  2.2*   < >  3.3*  4.0  3.6   CL  101   --    --   106  103   CO2  33*   --    --   32*  29   BUN  10   --    --   7*  6*   CREATININE  0.7   --    --   0.8  0.8   GLUCOSE  84   --    --   83  95   CALCIUM  7.3*   --    --   7.5*  7.9*    < > = values in the bile duct dilatation with a biliary catheter   in satisfactory position. XR CHEST PORTABLE   Final Result   Enlarging effusions and CHF. FL ERCP BILIARY AND PANCREATIC S&I   Final Result   Provided fluoroscopic spot images demonstrate dilatation of the common   bile duct with multiple stones. There is subsequent placement of a   wire and biliary stent. XR CHEST PORTABLE   Final Result   1. Right subclavian catheter tip in appropriate position. No   pneumothorax. 2. Mild patchy left basilar airspace disease, likely atelectasis   versus pneumonia. 3. Stable emphysematous changes. Assessment:  Principal Problem:    Ascending cholangitis  Active Problems:    Intractable nausea and vomiting    Severe sepsis (HCC)    GERD (gastroesophageal reflux disease)    Lactic acidosis    Sinus tachycardia    Thrombocytopenia (HCC)    Septicemia due to gram-negative organism (HCC)    Severe protein-calorie malnutrition (HCC)  Resolved Problems:    * No resolved hospital problems. *      Plan:  1. GNR septicemia from ascending cholangitis--appreciate GI efforts with ERCP and stent placement  Completed 6 days of Zosyn; 11/21 switched to Unasyn. day #5. Diflucan added secondary to persistent leukocytosis; day #6 treatment but switch oral back to IV 11/23 since will not take pill. 2. Leukocytosis--WBC peaked at 53.8 11/19. Since PICC line removed 11/20 (tip culture negative), WBC trending down and today at 13.7. Patient started on Diflucan 11/19 per ID. Of note, received one dose Vanco 11/15 and then on one dose 11/19. After my discussion with pharm D, placed on Vitamin C protocol  11/15 with Solu-cortef, vitamin C and thiamine. She completed protocol yesterday, but will wean Solu-cortef 50 mg bid x 2 days then daily x 2 days. 3. Hypotension--resolved. Pressors discontinued afternoon 11/16. 4. Transaminitis--related to above process. Trending down. 5. Sinus tachycardia--secondary to above.  Continue to monitor  6. Intractable N/V(resolved)-likely from cholangitis. 7. Lactic acidosis- resolved with last 3 values under 2. Lactic peaked at 6.6. IVF discontinued altogether Saturday. 8. Hypokalemia/hypomagnesemia/hypophosphatemia--K-phos IV ordered but did not want to take oral supplement. Increase potassium rich foods but the only thing she can tolerate is potatoes. She can not eat fruits because it aggravates her IBS. 9. Thrombocytopenia(resolved)- Likely from sepsis but improving daily since off Lovenox. Last dose of Lovenox on 11/17. Last three day platelets wnl. 10. Chronic diarrhea--negative stool culture. C diff negative. Patient follow strict diet at home. 11. Disposition--currently on telemetry. Patient has improved to point that SNF is more appropriate. Cancelled LTAC referral and will have  place SNF. Unfortunately, since insurance company closed holiday weekend, will be unable to pursue precertification until Monday. NOTE: This report was transcribed using voice recognition software.  Every effort was made to ensure accuracy; however, inadvertent computerized transcription errors may be present.     Electronically signed by Lina Alonzo MD on 11/25/2018 at 2:41 PM

## 2018-11-26 VITALS
SYSTOLIC BLOOD PRESSURE: 126 MMHG | RESPIRATION RATE: 16 BRPM | TEMPERATURE: 97.7 F | BODY MASS INDEX: 22.3 KG/M2 | OXYGEN SATURATION: 93 % | HEIGHT: 59 IN | WEIGHT: 110.6 LBS | HEART RATE: 91 BPM | DIASTOLIC BLOOD PRESSURE: 62 MMHG

## 2018-11-26 LAB
ALK PHOS OTHER CALC: 0 U/L
ALK PHOSPHATASE: 178 U/L (ref 40–120)
ALKALINE PHOSPHATASE BONE FRACTION: 89 U/L (ref 0–55)
ALKALINE PHOSPHATASE LIVER FRACTION: 89 U/L (ref 0–94)
BASOPHILS ABSOLUTE: 0 E9/L (ref 0–0.2)
BASOPHILS RELATIVE PERCENT: 0 % (ref 0–2)
CULTURE, BLOOD 2: ABNORMAL
EOSINOPHILS ABSOLUTE: 0.14 E9/L (ref 0.05–0.5)
EOSINOPHILS RELATIVE PERCENT: 1 % (ref 0–6)
HCT VFR BLD CALC: 29.8 % (ref 34–48)
HEMOGLOBIN: 9.4 G/DL (ref 11.5–15.5)
LYMPHOCYTES ABSOLUTE: 0.81 E9/L (ref 1.5–4)
LYMPHOCYTES RELATIVE PERCENT: 6 % (ref 20–42)
MCH RBC QN AUTO: 28.3 PG (ref 26–35)
MCHC RBC AUTO-ENTMCNC: 31.5 % (ref 32–34.5)
MCV RBC AUTO: 89.8 FL (ref 80–99.9)
METAMYELOCYTES RELATIVE PERCENT: 1 % (ref 0–1)
MONOCYTES ABSOLUTE: 0.27 E9/L (ref 0.1–0.95)
MONOCYTES RELATIVE PERCENT: 2 % (ref 2–12)
NEUTROPHILS ABSOLUTE: 12.29 E9/L (ref 1.8–7.3)
NEUTROPHILS RELATIVE PERCENT: 90 % (ref 43–80)
ORGANISM: ABNORMAL
ORGANISM: ABNORMAL
PDW BLD-RTO: 15.3 FL (ref 11.5–15)
PLATELET # BLD: 207 E9/L (ref 130–450)
PMV BLD AUTO: 10.6 FL (ref 7–12)
POLYCHROMASIA: ABNORMAL
RBC # BLD: 3.32 E12/L (ref 3.5–5.5)
WBC # BLD: 13.5 E9/L (ref 4.5–11.5)

## 2018-11-26 PROCEDURE — 2580000003 HC RX 258: Performed by: HOSPITALIST

## 2018-11-26 PROCEDURE — 6360000002 HC RX W HCPCS: Performed by: INTERNAL MEDICINE

## 2018-11-26 PROCEDURE — 85025 COMPLETE CBC W/AUTO DIFF WBC: CPT

## 2018-11-26 PROCEDURE — 6360000002 HC RX W HCPCS: Performed by: HOSPITALIST

## 2018-11-26 PROCEDURE — 97116 GAIT TRAINING THERAPY: CPT

## 2018-11-26 PROCEDURE — 97535 SELF CARE MNGMENT TRAINING: CPT

## 2018-11-26 PROCEDURE — 36415 COLL VENOUS BLD VENIPUNCTURE: CPT

## 2018-11-26 PROCEDURE — 99239 HOSP IP/OBS DSCHRG MGMT >30: CPT | Performed by: HOSPITALIST

## 2018-11-26 PROCEDURE — C9113 INJ PANTOPRAZOLE SODIUM, VIA: HCPCS | Performed by: HOSPITALIST

## 2018-11-26 PROCEDURE — 6360000002 HC RX W HCPCS: Performed by: SPECIALIST

## 2018-11-26 PROCEDURE — 2580000003 HC RX 258: Performed by: INTERNAL MEDICINE

## 2018-11-26 PROCEDURE — 2580000003 HC RX 258: Performed by: SPECIALIST

## 2018-11-26 PROCEDURE — 97530 THERAPEUTIC ACTIVITIES: CPT

## 2018-11-26 PROCEDURE — 36592 COLLECT BLOOD FROM PICC: CPT

## 2018-11-26 RX ADMIN — ONDANSETRON 4 MG: 2 INJECTION INTRAMUSCULAR; INTRAVENOUS at 11:46

## 2018-11-26 RX ADMIN — FLUCONAZOLE, SODIUM CHLORIDE 200 MG: 2 INJECTION INTRAVENOUS at 11:44

## 2018-11-26 RX ADMIN — SODIUM CHLORIDE 3 G: 900 INJECTION INTRAVENOUS at 03:01

## 2018-11-26 RX ADMIN — PANTOPRAZOLE SODIUM 40 MG: 40 INJECTION, POWDER, FOR SOLUTION INTRAVENOUS at 09:44

## 2018-11-26 RX ADMIN — SODIUM CHLORIDE 3 G: 900 INJECTION INTRAVENOUS at 09:44

## 2018-11-26 RX ADMIN — Medication 10 ML: at 11:46

## 2018-11-26 RX ADMIN — Medication 10 ML: at 09:44

## 2018-11-26 ASSESSMENT — PAIN SCALES - GENERAL
PAINLEVEL_OUTOF10: 0

## 2018-11-26 NOTE — DISCHARGE INSTR - COC
[OVZJS:5320]    Safety Concerns:     [address] Safety Concerns:572815923}    Impairments/Disabilities:      [address] Impairments/Disabilities:347236463}    Nutrition Therapy:  Current Nutrition Therapy:   [address] MON[address] Diet List:487589981}    Routes of Feeding: {CHP DME Other Feedings:683512931}  Liquids: {Slp liquid thickness:06492}  Daily Fluid Restriction: {CHP DME Yes amt example:328877399}  Last Modified Barium Swallow with Video (Video Swallowing Test): {Done Not Done DXWX:116610085}    Treatments at the Time of Hospital Discharge:   Respiratory Treatments: ***  Oxygen Therapy:  {Therapy; copd oxygen:32142}  Ventilator:    {[facility] Vent BXTJ:841699047}    Rehab Therapies: {THERAPEUTIC INTERVENTION:8358412992}  Weight Bearing Status/Restrictions: {[facility] Weight Bearin}  Other Medical Equipment (for information only, NOT a DME order):  {EQUIPMENT:867084505}  Other Treatments: ***    Patient's personal belongings (please select all that are sent with patient):  {[facility] DME Belongings:878040823}    RN SIGNATURE:      CASE MANAGEMENT/SOCIAL WORK SECTION    Inpatient Status Date: ***    Readmission Risk Assessment Score:  Readmission Risk              Risk of Unplanned Readmission:        17           Discharging to Facility/ Agency   · Name:   · Address:  · Phone:  · Fax:    Dialysis Facility (if applicable)   · Name:  · Address:  · Dialysis Schedule:  · Phone:  · Fax:    / signature: {Esignature:702275863}    PHYSICIAN SECTION    Prognosis: {Prognosis:2380868855}    Condition at Discharge: [address] Ramesh Patient Condition:017905871}    Rehab Potential (if transferring to Rehab): {Prognosis:1852015712}    Recommended Labs or Other Treatments After Discharge: ***    Physician Certification: I certify the above information and transfer of Lesvia Kyle  is necessary for the continuing treatment of the diagnosis listed and that she requires {Admit to Appropriate Level of Care:05159} for {GREATER/LESS:847502622} 30 days.      Update Admission H&P: {CHP DME Changes in RTGWT:713773146}    PHYSICIAN SIGNATURE:  {Esignature:579216413}

## 2018-11-26 NOTE — DISCHARGE SUMMARY
capsule  Commonly known as:  PRILOSEC  Take 1 capsule by mouth every 12 hours for 14 days     ondansetron 4 MG tablet  Commonly known as:  ZOFRAN  Take 1 tablet by mouth every 8 hours as needed for Nausea or Vomiting           Where to Get Your Medications      These medications were sent to [facility] [address] [address]    Phone:  [phone]   · fluconazole 200 MG tablet     You can get these medications from any pharmacy    Bring a paper prescription for each of these medications  · ampicillin-sulbactam infusion           Discharge Exam:  General Appearance: alert and oriented to person, place and time  Skin: warm and dry, no rash or erythema  Head: normocephalic and atraumatic  Eyes: pupils equal, round, and reactive to light,   Neck: neck supple and non tender   Pulmonary/Chest: clear to auscultation bilaterally  Cardiovascular: normal rate, normal S1 and S2,   Abdomen: soft, non-tender, non-distended, normal bowel sounds,   Extremities: no edema  Neurologic: Non focal     Vitals:    Vitals:    11/25/18 0800 11/25/18 2112 11/26/18 0613 11/26/18 0845   BP: (!) 146/74 128/69  125/63   Pulse: 103 89  95   Resp: 16 16  16   Temp: 98.1 °F (36.7 °C) 98 °F (36.7 °C)  98 °F (36.7 °C)   TempSrc: Oral Oral  Oral   SpO2: 95% 95%  93%   Weight:   110 lb 9.6 oz (50.2 kg)    Height:           I/O last 3 completed shifts: In: 860 [P.O.:660; IV Piggyback:200]  Out: 1650 [Urine:1650]  No intake/output data recorded.       LABS:  Recent Labs      11/24/18   0200  11/24/18   0545  11/25/18   0435   NA   --   145  141   K  3.3*  4.0  3.6   CL   --   106  103   CO2   --   32*  29   BUN   --   7*  6*   CREATININE   --   0.8  0.8   GLUCOSE   --   83  95   CALCIUM   --   7.5*  7.9*       Recent Labs      11/24/18   0545  11/25/18   0435  11/26/18   0430   WBC  18.5*  13.7*  13.5*   RBC  3.21*  2.97*  3.32*   HGB  9.2*

## 2018-11-26 NOTE — PROGRESS NOTES
Martha Barrera 1935    SUBJECTIVE:    Seen walking well with physical therapy. Likely for discharge soon, no complaints. OBJECTIVE  Physical Exam:  VITALS:  /63   Pulse 95   Temp 98 °F (36.7 °C) (Oral)   Resp 16   Ht 4' 11\" (1.499 m)   Wt 110 lb 9.6 oz (50.2 kg)   SpO2 93%   BMI 22.34 kg/m²   ENT:  oropharynx clear  NECK:  No  lymphadenopathy. HEMATOLOGIC/LYMPHATICS:  No abnormal lymphadenopathy. LUNGS:  Clear to auscultation   CARDIOVASCULAR:  Regular rate and rhythm. No clicks, murmurs, rubs or gallops. ABDOMEN:  Soft, nontender. No ascites. No mass or organomegaly. NEUROLOGIC:  No focal deficits. SKIN:  No rash. EXTREMITIES: LUE PICC line. No edema.     DIAGNOSTIC DATA  Labs:    Lab Results   Component Value Date    WBC 13.5 (H) 11/26/2018    HGB 9.4 (L) 11/26/2018    HCT 29.8 (L) 11/26/2018    MCV 89.8 11/26/2018     11/26/2018     No results found for: CEA  Recent Labs      11/24/18   0545  11/25/18   0435   NA  145  141   CO2  32*  29   PHOS  3.0  2.9   BUN  7*  6*   CREATININE  0.8  0.8     No results found for: FERRITIN  Lab Results   Component Value Date    ALT 40 (H) 11/25/2018    AST 21 11/25/2018    ALKPHOS 156 (H) 11/25/2018    BILITOT 0.7 11/25/2018     Lab Results   Component Value Date    LABALBU 2.7 (L) 11/25/2018         Current Facility-Administered Medications   Medication Dose Route Frequency Provider Last Rate Last Dose    fluconazole (DIFLUCAN) 200 mg in 0.9 % NaCl 100 mL premix IVPB  200 mg Intravenous Q24H Estelle Cuellar  mL/hr at 11/25/18 1201 200 mg at 11/25/18 1201    ampicillin-sulbactam (UNASYN) 3 g ivpb minibag  3 g Intravenous Q6H Margarita Peterson MD   Stopped at 11/26/18 0421    heparin flush 100 UNIT/ML injection 100 Units  100 Units Intercatheter PRN William Du MD   100 Units at 11/25/18 0911    pneumococcal 13-valent conjugate (PREVNAR) injection 0.5 mL  0.5 mL Intramuscular Once Moose Jacobo MD        magnesium

## 2019-04-26 NOTE — PROGRESS NOTES
[address] [facility]                                                                                                                    PRE OP INSTRUCTIONS FOR  Vicente Barrera        Date: 4/26/2019    Date of surgery: 4/29/2019   Arrival Time: 1330    1. Pt may have light breakfast up to  6 hours prior to arrival). Then strict NPO This includes no water, chewing gum, mints or ice chips. 2. Take the following medications with a small sip of water on the morning of Surgery: none     3. Diabetics may take evening dose of insulin but none after midnight. If you feel symptomatic or low blood sugar morning of surgery drink 1-2 ounces of apple juice only. 4. Aspirin, Ibuprofen, Advil, Naproxen, Vitamin E and other Anti-inflammatory products should be stopped  before surgery  as directed by your physician. Take Tylenol only unless instructed otherwise by your surgeon. 5. Check with your Doctor regarding stopping Plavix, Coumadin, Lovenox, Eliquis, Effient, or other blood thinners. 6. Do not smoke,use illicit drugs and do not drink any alcoholic beverages 24 hours prior to surgery. 7. You may brush your teeth the morning of surgery. DO NOT SWALLOW WATER    8. You MUST make arrangements for a responsible adult to take you home after your surgery. You will not be allowed to leave alone or drive yourself home. It is strongly suggested someone stay with you the first 24 hrs. Your surgery will be cancelled if you do not have a ride home. 9. PEDIATRIC PATIENTS ONLY:  A parent/legal guardian must accompany a child scheduled for surgery and plan to stay at the hospital until the child is discharged. Please do not bring other children with you. 10. Please wear simple, loose fitting clothing to the hospital.  Johnny Pacheco not bring valuables (money, credit cards, checkbooks, etc.) Do not wear any makeup (including no eye makeup) or nail polish on your fingers or toes.     11. DO NOT wear any jewelry or piercings on day of surgery. All body piercing jewelry must be removed. 12. Shower the night before surgery with _x__Antibacterial soap /CHEY WIPES________    13. TOTAL JOINT REPLACEMENT/HYSTERECTOMY PATIENTS ONLY---Remember to bring Blood Bank bracelet to the hospital on the day of surgery. 14. If you have a Living Will and Durable Power of  for Healthcare, please bring in a copy. 15. If appropriate bring crutches, inspirex, WALKER, CANE etc... 12. Notify your Surgeon if you develop any illness between now and surgery time, cough, cold, fever, sore throat, nausea, vomiting, etc.  Please notify your surgeon if you experience dizziness, shortness of breath or blurred vision between now & the time of your surgery. 17. If you have ___dentures, they will be removed before going to the OR; we will provide you a container. If you wear ___contact lenses or ___glasses, they will be removed; please bring a case for them. 18. To provide excellent care visitors will be limited to 2 in the room at any given time. 19. Please bring picture ID and insurance card. 20. Sleep apnea patients need to bring CPAP AND SETTINGS to hospital on day of surgery. 21. During flu season no children under the age of 15 are permitted in the hospital for the safety of all patients. 22. Other check in at the main lobby/information desk. Please call AMBULATORY CARE if you have any further questions.    [address] [facility]     [address]

## 2019-04-26 NOTE — PROGRESS NOTES
Pt's phone is busy for 3 hours. Several calls to contact, Flor Ibarra. Received call from pt that her phone is not working and she is calling from [facility], Cain Wiley, [phone].

## 2019-04-29 ENCOUNTER — ANESTHESIA (OUTPATIENT)
Dept: OPERATING ROOM | Age: 84
End: 2019-04-29
Payer: MEDICARE

## 2019-04-29 ENCOUNTER — APPOINTMENT (OUTPATIENT)
Dept: GENERAL RADIOLOGY | Age: 84
End: 2019-04-29
Attending: SPECIALIST
Payer: MEDICARE

## 2019-04-29 ENCOUNTER — HOSPITAL ENCOUNTER (OUTPATIENT)
Age: 84
Setting detail: OUTPATIENT SURGERY
Discharge: HOME | End: 2019-04-29
Attending: SPECIALIST | Admitting: SPECIALIST
Payer: MEDICARE

## 2019-04-29 ENCOUNTER — ANESTHESIA EVENT (OUTPATIENT)
Dept: OPERATING ROOM | Age: 84
End: 2019-04-29
Payer: MEDICARE

## 2019-04-29 VITALS
RESPIRATION RATE: 16 BRPM | HEART RATE: 84 BPM | WEIGHT: 95.44 LBS | HEIGHT: 59 IN | SYSTOLIC BLOOD PRESSURE: 110 MMHG | TEMPERATURE: 97.7 F | DIASTOLIC BLOOD PRESSURE: 62 MMHG | BODY MASS INDEX: 19.24 KG/M2 | OXYGEN SATURATION: 94 %

## 2019-04-29 VITALS
TEMPERATURE: 98.6 F | SYSTOLIC BLOOD PRESSURE: 134 MMHG | OXYGEN SATURATION: 100 % | RESPIRATION RATE: 16 BRPM | DIASTOLIC BLOOD PRESSURE: 76 MMHG

## 2019-04-29 DIAGNOSIS — T85.590A: ICD-10-CM

## 2019-04-29 LAB
ANION GAP SERPL CALCULATED.3IONS-SCNC: 10 MMOL/L (ref 7–16)
BUN BLDV-MCNC: 14 MG/DL (ref 8–23)
CALCIUM SERPL-MCNC: 9.3 MG/DL (ref 8.6–10.2)
CHLORIDE BLD-SCNC: 103 MMOL/L (ref 98–107)
CO2: 29 MMOL/L (ref 22–29)
CREAT SERPL-MCNC: 0.8 MG/DL (ref 0.5–1)
GFR AFRICAN AMERICAN: >60
GFR NON-AFRICAN AMERICAN: >60 ML/MIN/1.73
GLUCOSE BLD-MCNC: 101 MG/DL (ref 74–99)
HCT VFR BLD CALC: 39.2 % (ref 34–48)
HEMOGLOBIN: 12.7 G/DL (ref 11.5–15.5)
MCH RBC QN AUTO: 27.6 PG (ref 26–35)
MCHC RBC AUTO-ENTMCNC: 32.4 % (ref 32–34.5)
MCV RBC AUTO: 85.2 FL (ref 80–99.9)
PDW BLD-RTO: 14 FL (ref 11.5–15)
PLATELET # BLD: 209 E9/L (ref 130–450)
PMV BLD AUTO: 9.5 FL (ref 7–12)
POTASSIUM REFLEX MAGNESIUM: 3.8 MMOL/L (ref 3.5–5)
RBC # BLD: 4.6 E12/L (ref 3.5–5.5)
SODIUM BLD-SCNC: 142 MMOL/L (ref 132–146)
WBC # BLD: 6.1 E9/L (ref 4.5–11.5)

## 2019-04-29 PROCEDURE — 2580000003 HC RX 258: Performed by: ANESTHESIOLOGY

## 2019-04-29 PROCEDURE — 7100000000 HC PACU RECOVERY - FIRST 15 MIN: Performed by: SPECIALIST

## 2019-04-29 PROCEDURE — 7100000011 HC PHASE II RECOVERY - ADDTL 15 MIN: Performed by: SPECIALIST

## 2019-04-29 PROCEDURE — 6360000002 HC RX W HCPCS: Performed by: NURSE ANESTHETIST, CERTIFIED REGISTERED

## 2019-04-29 PROCEDURE — 3600007501: Performed by: SPECIALIST

## 2019-04-29 PROCEDURE — 2720000010 HC SURG SUPPLY STERILE: Performed by: SPECIALIST

## 2019-04-29 PROCEDURE — 2709999900 HC NON-CHARGEABLE SUPPLY: Performed by: SPECIALIST

## 2019-04-29 PROCEDURE — 3600007511: Performed by: SPECIALIST

## 2019-04-29 PROCEDURE — 7100000001 HC PACU RECOVERY - ADDTL 15 MIN: Performed by: SPECIALIST

## 2019-04-29 PROCEDURE — 85027 COMPLETE CBC AUTOMATED: CPT

## 2019-04-29 PROCEDURE — 93005 ELECTROCARDIOGRAM TRACING: CPT | Performed by: ANESTHESIOLOGY

## 2019-04-29 PROCEDURE — 3700000000 HC ANESTHESIA ATTENDED CARE: Performed by: SPECIALIST

## 2019-04-29 PROCEDURE — 3700000001 HC ADD 15 MINUTES (ANESTHESIA): Performed by: SPECIALIST

## 2019-04-29 PROCEDURE — 36415 COLL VENOUS BLD VENIPUNCTURE: CPT

## 2019-04-29 PROCEDURE — C1769 GUIDE WIRE: HCPCS | Performed by: SPECIALIST

## 2019-04-29 PROCEDURE — 7100000010 HC PHASE II RECOVERY - FIRST 15 MIN: Performed by: SPECIALIST

## 2019-04-29 PROCEDURE — 6360000004 HC RX CONTRAST MEDICATION: Performed by: SPECIALIST

## 2019-04-29 PROCEDURE — 74330 X-RAY BILE/PANC ENDOSCOPY: CPT

## 2019-04-29 PROCEDURE — 2500000003 HC RX 250 WO HCPCS: Performed by: NURSE ANESTHETIST, CERTIFIED REGISTERED

## 2019-04-29 PROCEDURE — 80048 BASIC METABOLIC PNL TOTAL CA: CPT

## 2019-04-29 RX ORDER — DEXAMETHASONE SODIUM PHOSPHATE 4 MG/ML
INJECTION, SOLUTION INTRA-ARTICULAR; INTRALESIONAL; INTRAMUSCULAR; INTRAVENOUS; SOFT TISSUE PRN
Status: DISCONTINUED | OUTPATIENT
Start: 2019-04-29 | End: 2019-04-29 | Stop reason: SDUPTHER

## 2019-04-29 RX ORDER — URSODIOL 500 MG/1
250 TABLET, FILM COATED ORAL NIGHTLY
Qty: 30 TABLET | Refills: 3 | Status: SHIPPED | OUTPATIENT
Start: 2019-04-29 | End: 2021-08-30

## 2019-04-29 RX ORDER — LIDOCAINE HYDROCHLORIDE 20 MG/ML
INJECTION, SOLUTION INTRAVENOUS PRN
Status: DISCONTINUED | OUTPATIENT
Start: 2019-04-29 | End: 2019-04-29 | Stop reason: SDUPTHER

## 2019-04-29 RX ORDER — PROPOFOL 10 MG/ML
INJECTION, EMULSION INTRAVENOUS PRN
Status: DISCONTINUED | OUTPATIENT
Start: 2019-04-29 | End: 2019-04-29 | Stop reason: SDUPTHER

## 2019-04-29 RX ORDER — ROCURONIUM BROMIDE 10 MG/ML
INJECTION, SOLUTION INTRAVENOUS PRN
Status: DISCONTINUED | OUTPATIENT
Start: 2019-04-29 | End: 2019-04-29 | Stop reason: SDUPTHER

## 2019-04-29 RX ORDER — FENTANYL CITRATE 50 UG/ML
INJECTION, SOLUTION INTRAMUSCULAR; INTRAVENOUS PRN
Status: DISCONTINUED | OUTPATIENT
Start: 2019-04-29 | End: 2019-04-29 | Stop reason: SDUPTHER

## 2019-04-29 RX ORDER — SODIUM CHLORIDE, SODIUM LACTATE, POTASSIUM CHLORIDE, CALCIUM CHLORIDE 600; 310; 30; 20 MG/100ML; MG/100ML; MG/100ML; MG/100ML
INJECTION, SOLUTION INTRAVENOUS CONTINUOUS
Status: DISCONTINUED | OUTPATIENT
Start: 2019-04-29 | End: 2019-04-29 | Stop reason: HOSPADM

## 2019-04-29 RX ORDER — 0.9 % SODIUM CHLORIDE 0.9 %
10 VIAL (ML) INJECTION PRN
Status: DISCONTINUED | OUTPATIENT
Start: 2019-04-29 | End: 2019-04-29 | Stop reason: HOSPADM

## 2019-04-29 RX ORDER — ONDANSETRON 2 MG/ML
INJECTION INTRAMUSCULAR; INTRAVENOUS PRN
Status: DISCONTINUED | OUTPATIENT
Start: 2019-04-29 | End: 2019-04-29 | Stop reason: SDUPTHER

## 2019-04-29 RX ORDER — SODIUM CHLORIDE 0.9 % (FLUSH) 0.9 %
10 SYRINGE (ML) INJECTION PRN
Status: DISCONTINUED | OUTPATIENT
Start: 2019-04-29 | End: 2019-04-29 | Stop reason: HOSPADM

## 2019-04-29 RX ORDER — SODIUM CHLORIDE 0.9 % (FLUSH) 0.9 %
10 SYRINGE (ML) INJECTION EVERY 12 HOURS SCHEDULED
Status: DISCONTINUED | OUTPATIENT
Start: 2019-04-29 | End: 2019-04-29 | Stop reason: HOSPADM

## 2019-04-29 RX ORDER — SUCCINYLCHOLINE/SOD CL,ISO/PF 200MG/10ML
SYRINGE (ML) INTRAVENOUS PRN
Status: DISCONTINUED | OUTPATIENT
Start: 2019-04-29 | End: 2019-04-29 | Stop reason: SDUPTHER

## 2019-04-29 RX ADMIN — Medication 2.5 MG: at 17:21

## 2019-04-29 RX ADMIN — DEXAMETHASONE SODIUM PHOSPHATE 4 MG: 4 INJECTION, SOLUTION INTRAMUSCULAR; INTRAVENOUS at 17:48

## 2019-04-29 RX ADMIN — FENTANYL CITRATE 50 MCG: 50 INJECTION, SOLUTION INTRAMUSCULAR; INTRAVENOUS at 17:21

## 2019-04-29 RX ADMIN — PROPOFOL 80 MG: 10 INJECTION, EMULSION INTRAVENOUS at 17:21

## 2019-04-29 RX ADMIN — ONDANSETRON HYDROCHLORIDE 4 MG: 2 INJECTION, SOLUTION INTRAMUSCULAR; INTRAVENOUS at 17:48

## 2019-04-29 RX ADMIN — LIDOCAINE HYDROCHLORIDE 40 MG: 20 INJECTION, SOLUTION INTRAVENOUS at 17:21

## 2019-04-29 RX ADMIN — Medication 80 MG: at 17:21

## 2019-04-29 RX ADMIN — SODIUM CHLORIDE, POTASSIUM CHLORIDE, SODIUM LACTATE AND CALCIUM CHLORIDE: 600; 310; 30; 20 INJECTION, SOLUTION INTRAVENOUS at 14:16

## 2019-04-29 RX ADMIN — FENTANYL CITRATE 50 MCG: 50 INJECTION, SOLUTION INTRAMUSCULAR; INTRAVENOUS at 17:40

## 2019-04-29 ASSESSMENT — PULMONARY FUNCTION TESTS
PIF_VALUE: 5
PIF_VALUE: 14
PIF_VALUE: 4
PIF_VALUE: 12
PIF_VALUE: 12
PIF_VALUE: 14
PIF_VALUE: 14
PIF_VALUE: 7
PIF_VALUE: 12
PIF_VALUE: 12
PIF_VALUE: 14
PIF_VALUE: 25
PIF_VALUE: 12
PIF_VALUE: 15
PIF_VALUE: 17
PIF_VALUE: 22
PIF_VALUE: 1
PIF_VALUE: 21
PIF_VALUE: 23
PIF_VALUE: 14
PIF_VALUE: 7
PIF_VALUE: 0
PIF_VALUE: 14
PIF_VALUE: 17
PIF_VALUE: 14
PIF_VALUE: 21
PIF_VALUE: 14
PIF_VALUE: 5
PIF_VALUE: 12
PIF_VALUE: 14
PIF_VALUE: 16
PIF_VALUE: 14
PIF_VALUE: 14
PIF_VALUE: 21
PIF_VALUE: 14
PIF_VALUE: 12
PIF_VALUE: 14
PIF_VALUE: 20
PIF_VALUE: 14
PIF_VALUE: 22
PIF_VALUE: 32
PIF_VALUE: 14
PIF_VALUE: 12
PIF_VALUE: 0
PIF_VALUE: 10
PIF_VALUE: 12
PIF_VALUE: 16
PIF_VALUE: 17
PIF_VALUE: 17
PIF_VALUE: 14
PIF_VALUE: 14
PIF_VALUE: 6
PIF_VALUE: 14
PIF_VALUE: 14
PIF_VALUE: 17
PIF_VALUE: 15

## 2019-04-29 ASSESSMENT — PAIN - FUNCTIONAL ASSESSMENT: PAIN_FUNCTIONAL_ASSESSMENT: 0-10

## 2019-04-29 ASSESSMENT — PAIN SCALES - GENERAL
PAINLEVEL_OUTOF10: 0

## 2019-04-29 ASSESSMENT — PAIN DESCRIPTION - PAIN TYPE
TYPE: SURGICAL PAIN

## 2019-04-29 ASSESSMENT — PAIN DESCRIPTION - LOCATION
LOCATION: ABDOMEN

## 2019-04-29 ASSESSMENT — PAIN DESCRIPTION - DESCRIPTORS: DESCRIPTORS: ACHING;NAGGING

## 2019-04-29 NOTE — ANESTHESIA POSTPROCEDURE EVALUATION
Department of Anesthesiology  Postprocedure Note    Patient: Foster Prajapati  MRN: [id number]  YOB: 1935  Date of evaluation: 4/29/2019  Time:  7:59 PM     Procedure Summary     Date:  04/29/19 Room / Location:  [address] [place] [address] 04 / [address] [address] [place] [address]    Anesthesia Start:  1717 Anesthesia Stop:  1825    Procedure:  ERCP with stent removal and balloon sweep with stone removal with cholangiography (N/A ) Diagnosis:  (CBD STENT REMOVAL)    Surgeon:  Malik Quiles MD Responsible Provider:  Eriberto Guillory MD    Anesthesia Type:  general ASA Status:  3          Anesthesia Type: general    Dick Phase I: Dick Score: 9    Dick Phase II: Dick Score: 10    Last vitals: Reviewed and per EMR flowsheets.        Anesthesia Post Evaluation    Patient location during evaluation: PACU  Patient participation: complete - patient participated  Level of consciousness: awake  Pain score: 0  Airway patency: patent  Nausea & Vomiting: no nausea  Complications: no  Cardiovascular status: blood pressure returned to baseline  Respiratory status: acceptable  Hydration status: euvolemic

## 2019-04-29 NOTE — ANESTHESIA PRE PROCEDURE
History:        Diagnosis Date    Abdominal pain     Anxiety     PONV (postoperative nausea and vomiting)     Severe sepsis ([address] 11/15/2018    Sinus tachycardia 11/15/2018       Past Surgical History:        Procedure Laterality Date    CHOLECYSTECTOMY      COLONOSCOPY      ENDOSCOPY, COLON, DIAGNOSTIC      ERCP  6-24-15    ballon extraction with stent insertion    EYE SURGERY Bilateral     cataracts    RI ERCP BILIARY/PANC DUCT STENT EXCHANGE W/DIL&WIRE N/A 11/15/2018    ERCP ENDOSCOPIC RETROGRADE CHOLANGIOPANCREATOGRAPHY 7a7 stent 15-18 balloon sweep stone removal performed by Malik Quiles MD at [facility] History:    Social History     Tobacco Use    Smoking status: Never Smoker    Smokeless tobacco: Never Used   Substance Use Topics    Alcohol use: No                                Counseling given: Not Answered      Vital Signs (Current):   Vitals:    04/26/19 1141   Weight: 97 lb (44 kg)   Height: 4' 11\" (1.499 m)                                              BP Readings from Last 3 Encounters:   12/10/18 132/80   11/26/18 126/62   11/15/18 (!) 99/55       NPO Status:                                                                                 BMI:   Wt Readings from Last 3 Encounters:   11/26/18 110 lb 9.6 oz (50.2 kg)   11/14/18 104 lb (47.2 kg)   08/25/18 105 lb (47.6 kg)     Body mass index is 19.59 kg/m².     CBC:   Lab Results   Component Value Date    WBC 13.5 11/26/2018    RBC 3.32 11/26/2018    RBC  06/24/2015      RBC           [id number]    released     06/28/15  00:58  SCC    HGB 9.4 11/26/2018    HCT 29.8 11/26/2018    MCV 89.8 11/26/2018    RDW 15.3 11/26/2018     11/26/2018       CMP:   Lab Results   Component Value Date     11/25/2018    K 3.6 11/25/2018    K 3.3 11/15/2018     11/25/2018    CO2 29 11/25/2018    BUN 6 11/25/2018    CREATININE 0.8 11/25/2018    GFRAA >60 11/25/2018    LABGLOM >60 11/25/2018    GLUCOSE 95 11/25/2018    PROT

## 2019-04-29 NOTE — H&P
GI H&P NOTE    [facility] M.D., Dr. Benito Peters M.D., Dr. Aishwarya Lee D.O., Dr. Arya Brooks M.D., Dr. Madelin Jack D.O., GI fellow      Date:5:04 PM 4/29/2019    Shahzad Barrera  80 y.o.  female    HPI:  Pt with a hx of ERCP with stent placement for biliary sepsis which was placed Autumn of 2018. She has been having some chronic abdominal pain. Plans are for stent removal today with potential biliary duct sweep with a balloon. Plan and procedure was discussed with the patient and all questions addressed in detail. PAST MEDICAL Hx:  Past Medical History:   Diagnosis Date    Abdominal pain     Anxiety     PONV (postoperative nausea and vomiting)     Severe sepsis (HCC) 11/15/2018    Sinus tachycardia 11/15/2018       PAST SURGICAL Hx:   Past Surgical History:   Procedure Laterality Date    CHOLECYSTECTOMY      COLONOSCOPY      ENDOSCOPY, COLON, DIAGNOSTIC      ERCP  6-24-15    ballon extraction with stent insertion    EYE SURGERY Bilateral     cataracts    [place] ERCP BILIARY/PANC DUCT STENT EXCHANGE W/DIL&WIRE N/A 11/15/2018    ERCP ENDOSCOPIC RETROGRADE CHOLANGIOPANCREATOGRAPHY 7a7 stent 15-18 balloon sweep stone removal performed by Vivian Carranza MD at [address] Hx:  Family History   Problem Relation Age of Onset    Other Mother     Cancer Father        HOME MEDICATIONS:  Prior to Admission medications    Medication Sig Start Date End Date Taking?  Authorizing Provider   clorazepate (TRANXENE) 7.5 MG tablet Take 7.5 mg by mouth 2 times daily   Yes Historical Provider, MD   ondansetron (ZOFRAN) 4 MG tablet Take 1 tablet by mouth every 8 hours as needed for Nausea or Vomiting 8/25/18   Carter Kimball DO   omeprazole (PRILOSEC) 20 MG delayed release capsule Take 1 capsule by mouth every 12 hours for 14 days 8/25/18 9/8/18  Tamika Phipps DO       ALLERGIES:  Other    SOCIAL Hx:  Social History

## 2019-04-29 NOTE — PROCEDURES
Procedure:  Endoscopic Retrograde Cholangiopancreatography    Indication:   Biliary Stent removal, Choledocholithiasis    Sedation  General Sedation    Side Viewing Endoscope was advanced easily through mouth to second portion of duodenum. Esophagus and Gastric views were limited. Scope was positioned with the Ampulla and biliary stent in view and the wheels were locked. Snare was employed to lasso the biliary stent at the distal kevin. The stent was then removed in its entirety through the scop port. Sphincterotome was advanced and the common bile duct was cannulated on the first attempt. The wire was advanced and locked into position. Next the sphincterotome was removed over wire and a 12-15mm balloon was advanced into the bile duct. The balloon was inserted just proximal to the whitley hepatis. No stones were seen above this. A pull through produced 2 small yellow cholesterol stones with some sludge. A second pull through was performed and no further stones or sludge was produced. A third pull through was performed and there was no further sludge or stones, a fourth pull through with the same results. The balloon was reinserted a pressure cholangiogram was taken with dye injected and slowing pulling out the balloon. Bile duct appeared cleared with no residual stones. Good bile drainage and pneumobilia noted after the balloon and wire was withdrawn. Pictures were taken of the stones and sludge. Air was suctioned from the duodenum and stomach and scope was removed. Pt tolerated procedure well with no complications. The pancreatic was not cannulated or injected with dye during the procedure. IMPRESSION AND PLAN:     1. Biliary stent removal with brown/cholesterol stones and sludge removed with balloon sweeps.  Given the patient's recurrent formation of bilary stones and multiple bouts of septic cholangitis we will prescribe Jm in an attempt to reduce stone formation and increase the viscosity of her bile. 2. Please follow up as outpatient in the outpatient office in 2-3 weeks, call [phone] to schedule for appointment.       Pt was seen and procedure was performed with Dr. Julienne Fu present for the entire procedure

## 2019-05-01 LAB
EKG ATRIAL RATE: 69 BPM
EKG P AXIS: 53 DEGREES
EKG P-R INTERVAL: 146 MS
EKG Q-T INTERVAL: 364 MS
EKG QRS DURATION: 86 MS
EKG QTC CALCULATION (BAZETT): 390 MS
EKG R AXIS: -38 DEGREES
EKG T AXIS: -4 DEGREES
EKG VENTRICULAR RATE: 69 BPM

## 2021-08-30 ENCOUNTER — APPOINTMENT (OUTPATIENT)
Dept: CT IMAGING | Age: 86
DRG: 392 | End: 2021-08-30
Payer: MEDICARE

## 2021-08-30 ENCOUNTER — HOSPITAL ENCOUNTER (INPATIENT)
Age: 86
LOS: 1 days | Discharge: HOME | DRG: 392 | End: 2021-08-31
Attending: EMERGENCY MEDICINE | Admitting: INTERNAL MEDICINE
Payer: MEDICARE

## 2021-08-30 DIAGNOSIS — K83.8 PNEUMOBILIA: Primary | ICD-10-CM

## 2021-08-30 DIAGNOSIS — R10.11 RIGHT UPPER QUADRANT ABDOMINAL PAIN: ICD-10-CM

## 2021-08-30 LAB
ALBUMIN SERPL-MCNC: 4 G/DL (ref 3.5–5.2)
ALP BLD-CCNC: 122 U/L (ref 35–104)
ALT SERPL-CCNC: 10 U/L (ref 0–32)
ANION GAP SERPL CALCULATED.3IONS-SCNC: 10 MMOL/L (ref 7–16)
AST SERPL-CCNC: 19 U/L (ref 0–31)
BACTERIA: ABNORMAL /HPF
BASOPHILS ABSOLUTE: 0.03 E9/L (ref 0–0.2)
BASOPHILS RELATIVE PERCENT: 0.6 % (ref 0–2)
BILIRUB SERPL-MCNC: 0.5 MG/DL (ref 0–1.2)
BILIRUBIN URINE: NEGATIVE
BLOOD, URINE: ABNORMAL
BUN BLDV-MCNC: 12 MG/DL (ref 6–23)
CALCIUM SERPL-MCNC: 9.3 MG/DL (ref 8.6–10.2)
CHLORIDE BLD-SCNC: 103 MMOL/L (ref 98–107)
CLARITY: CLEAR
CO2: 27 MMOL/L (ref 22–29)
COLOR: YELLOW
CREAT SERPL-MCNC: 1 MG/DL (ref 0.5–1)
EKG ATRIAL RATE: 75 BPM
EKG P AXIS: 72 DEGREES
EKG P-R INTERVAL: 152 MS
EKG Q-T INTERVAL: 368 MS
EKG QRS DURATION: 92 MS
EKG QTC CALCULATION (BAZETT): 410 MS
EKG R AXIS: -51 DEGREES
EKG T AXIS: 31 DEGREES
EKG VENTRICULAR RATE: 75 BPM
EOSINOPHILS ABSOLUTE: 0.14 E9/L (ref 0.05–0.5)
EOSINOPHILS RELATIVE PERCENT: 2.8 % (ref 0–6)
EPITHELIAL CELLS, UA: ABNORMAL /HPF
GFR AFRICAN AMERICAN: >60
GFR NON-AFRICAN AMERICAN: 53 ML/MIN/1.73
GLUCOSE BLD-MCNC: 102 MG/DL (ref 74–99)
GLUCOSE URINE: NEGATIVE MG/DL
HCT VFR BLD CALC: 39.7 % (ref 34–48)
HEMOGLOBIN: 13.2 G/DL (ref 11.5–15.5)
IMMATURE GRANULOCYTES #: 0.04 E9/L
IMMATURE GRANULOCYTES %: 0.8 % (ref 0–5)
KETONES, URINE: NEGATIVE MG/DL
LACTIC ACID: 1.7 MMOL/L (ref 0.5–2.2)
LEUKOCYTE ESTERASE, URINE: ABNORMAL
LIPASE: 34 U/L (ref 13–60)
LYMPHOCYTES ABSOLUTE: 1.92 E9/L (ref 1.5–4)
LYMPHOCYTES RELATIVE PERCENT: 37.7 % (ref 20–42)
MCH RBC QN AUTO: 28.4 PG (ref 26–35)
MCHC RBC AUTO-ENTMCNC: 33.2 % (ref 32–34.5)
MCV RBC AUTO: 85.4 FL (ref 80–99.9)
MONOCYTES ABSOLUTE: 0.53 E9/L (ref 0.1–0.95)
MONOCYTES RELATIVE PERCENT: 10.4 % (ref 2–12)
NEUTROPHILS ABSOLUTE: 2.43 E9/L (ref 1.8–7.3)
NEUTROPHILS RELATIVE PERCENT: 47.7 % (ref 43–80)
NITRITE, URINE: NEGATIVE
PDW BLD-RTO: 13 FL (ref 11.5–15)
PH UA: 6 (ref 5–9)
PLATELET # BLD: 175 E9/L (ref 130–450)
PMV BLD AUTO: 9.3 FL (ref 7–12)
POTASSIUM REFLEX MAGNESIUM: 3.7 MMOL/L (ref 3.5–5)
PROTEIN UA: NEGATIVE MG/DL
RBC # BLD: 4.65 E12/L (ref 3.5–5.5)
RBC UA: ABNORMAL /HPF (ref 0–2)
SODIUM BLD-SCNC: 140 MMOL/L (ref 132–146)
SPECIFIC GRAVITY UA: 1.02 (ref 1–1.03)
TOTAL PROTEIN: 6.9 G/DL (ref 6.4–8.3)
UROBILINOGEN, URINE: 0.2 E.U./DL
WBC # BLD: 5.1 E9/L (ref 4.5–11.5)
WBC UA: ABNORMAL /HPF (ref 0–5)

## 2021-08-30 PROCEDURE — 2580000003 HC RX 258: Performed by: STUDENT IN AN ORGANIZED HEALTH CARE EDUCATION/TRAINING PROGRAM

## 2021-08-30 PROCEDURE — 96375 TX/PRO/DX INJ NEW DRUG ADDON: CPT

## 2021-08-30 PROCEDURE — 85025 COMPLETE CBC W/AUTO DIFF WBC: CPT

## 2021-08-30 PROCEDURE — 93005 ELECTROCARDIOGRAM TRACING: CPT | Performed by: STUDENT IN AN ORGANIZED HEALTH CARE EDUCATION/TRAINING PROGRAM

## 2021-08-30 PROCEDURE — 6360000002 HC RX W HCPCS: Performed by: STUDENT IN AN ORGANIZED HEALTH CARE EDUCATION/TRAINING PROGRAM

## 2021-08-30 PROCEDURE — 80053 COMPREHEN METABOLIC PANEL: CPT

## 2021-08-30 PROCEDURE — 96366 THER/PROPH/DIAG IV INF ADDON: CPT

## 2021-08-30 PROCEDURE — G0378 HOSPITAL OBSERVATION PER HR: HCPCS

## 2021-08-30 PROCEDURE — 2580000003 HC RX 258: Performed by: INTERNAL MEDICINE

## 2021-08-30 PROCEDURE — 96374 THER/PROPH/DIAG INJ IV PUSH: CPT

## 2021-08-30 PROCEDURE — 96365 THER/PROPH/DIAG IV INF INIT: CPT

## 2021-08-30 PROCEDURE — 83690 ASSAY OF LIPASE: CPT

## 2021-08-30 PROCEDURE — 6360000002 HC RX W HCPCS: Performed by: INTERNAL MEDICINE

## 2021-08-30 PROCEDURE — 96367 TX/PROPH/DG ADDL SEQ IV INF: CPT

## 2021-08-30 PROCEDURE — 6360000002 HC RX W HCPCS: Performed by: NURSE PRACTITIONER

## 2021-08-30 PROCEDURE — 74177 CT ABD & PELVIS W/CONTRAST: CPT

## 2021-08-30 PROCEDURE — 6360000004 HC RX CONTRAST MEDICATION: Performed by: RADIOLOGY

## 2021-08-30 PROCEDURE — 83605 ASSAY OF LACTIC ACID: CPT

## 2021-08-30 PROCEDURE — 99223 1ST HOSP IP/OBS HIGH 75: CPT | Performed by: STUDENT IN AN ORGANIZED HEALTH CARE EDUCATION/TRAINING PROGRAM

## 2021-08-30 PROCEDURE — 81001 URINALYSIS AUTO W/SCOPE: CPT

## 2021-08-30 PROCEDURE — 2580000003 HC RX 258: Performed by: NURSE PRACTITIONER

## 2021-08-30 PROCEDURE — 99285 EMERGENCY DEPT VISIT HI MDM: CPT

## 2021-08-30 PROCEDURE — 1200000000 HC SEMI PRIVATE

## 2021-08-30 PROCEDURE — 96372 THER/PROPH/DIAG INJ SC/IM: CPT

## 2021-08-30 PROCEDURE — C9113 INJ PANTOPRAZOLE SODIUM, VIA: HCPCS | Performed by: INTERNAL MEDICINE

## 2021-08-30 RX ORDER — SODIUM CHLORIDE 9 MG/ML
10 INJECTION INTRAVENOUS DAILY
Status: DISCONTINUED | OUTPATIENT
Start: 2021-08-30 | End: 2021-08-30 | Stop reason: SDUPTHER

## 2021-08-30 RX ORDER — ONDANSETRON 4 MG/1
4 TABLET, ORALLY DISINTEGRATING ORAL EVERY 8 HOURS PRN
Status: DISCONTINUED | OUTPATIENT
Start: 2021-08-30 | End: 2021-08-31 | Stop reason: HOSPADM

## 2021-08-30 RX ORDER — SODIUM CHLORIDE 0.9 % (FLUSH) 0.9 %
5-40 SYRINGE (ML) INJECTION PRN
Status: DISCONTINUED | OUTPATIENT
Start: 2021-08-30 | End: 2021-08-31 | Stop reason: HOSPADM

## 2021-08-30 RX ORDER — SODIUM CHLORIDE, SODIUM LACTATE, POTASSIUM CHLORIDE, CALCIUM CHLORIDE 600; 310; 30; 20 MG/100ML; MG/100ML; MG/100ML; MG/100ML
INJECTION, SOLUTION INTRAVENOUS CONTINUOUS
Status: DISCONTINUED | OUTPATIENT
Start: 2021-08-30 | End: 2021-08-31 | Stop reason: HOSPADM

## 2021-08-30 RX ORDER — PANTOPRAZOLE SODIUM 40 MG/10ML
80 INJECTION, POWDER, LYOPHILIZED, FOR SOLUTION INTRAVENOUS ONCE
Status: DISCONTINUED | OUTPATIENT
Start: 2021-08-30 | End: 2021-08-30 | Stop reason: SDUPTHER

## 2021-08-30 RX ORDER — PANTOPRAZOLE SODIUM 40 MG/1
40 TABLET, DELAYED RELEASE ORAL
Status: DISCONTINUED | OUTPATIENT
Start: 2021-08-31 | End: 2021-08-31 | Stop reason: HOSPADM

## 2021-08-30 RX ORDER — SODIUM CHLORIDE 0.9 % (FLUSH) 0.9 %
5-40 SYRINGE (ML) INJECTION EVERY 12 HOURS SCHEDULED
Status: DISCONTINUED | OUTPATIENT
Start: 2021-08-30 | End: 2021-08-31 | Stop reason: HOSPADM

## 2021-08-30 RX ORDER — PANTOPRAZOLE SODIUM 40 MG/10ML
40 INJECTION, POWDER, LYOPHILIZED, FOR SOLUTION INTRAVENOUS 2 TIMES DAILY
Status: DISCONTINUED | OUTPATIENT
Start: 2021-08-31 | End: 2021-08-30

## 2021-08-30 RX ORDER — SODIUM CHLORIDE 0.9 % (FLUSH) 0.9 %
10 SYRINGE (ML) INJECTION
Status: ACTIVE | OUTPATIENT
Start: 2021-08-30 | End: 2021-08-30

## 2021-08-30 RX ORDER — 0.9 % SODIUM CHLORIDE 0.9 %
1000 INTRAVENOUS SOLUTION INTRAVENOUS ONCE
Status: COMPLETED | OUTPATIENT
Start: 2021-08-30 | End: 2021-08-30

## 2021-08-30 RX ORDER — ONDANSETRON 2 MG/ML
4 INJECTION INTRAMUSCULAR; INTRAVENOUS EVERY 6 HOURS PRN
Status: DISCONTINUED | OUTPATIENT
Start: 2021-08-30 | End: 2021-08-31 | Stop reason: HOSPADM

## 2021-08-30 RX ORDER — ACETAMINOPHEN 650 MG/1
650 SUPPOSITORY RECTAL EVERY 6 HOURS PRN
Status: DISCONTINUED | OUTPATIENT
Start: 2021-08-30 | End: 2021-08-31 | Stop reason: HOSPADM

## 2021-08-30 RX ORDER — MORPHINE SULFATE 4 MG/ML
4 INJECTION, SOLUTION INTRAMUSCULAR; INTRAVENOUS ONCE
Status: COMPLETED | OUTPATIENT
Start: 2021-08-30 | End: 2021-08-30

## 2021-08-30 RX ORDER — MORPHINE SULFATE 2 MG/ML
2 INJECTION, SOLUTION INTRAMUSCULAR; INTRAVENOUS EVERY 4 HOURS PRN
Status: DISCONTINUED | OUTPATIENT
Start: 2021-08-30 | End: 2021-08-31 | Stop reason: HOSPADM

## 2021-08-30 RX ORDER — POLYETHYLENE GLYCOL 3350 17 G/17G
17 POWDER, FOR SOLUTION ORAL DAILY PRN
Status: DISCONTINUED | OUTPATIENT
Start: 2021-08-30 | End: 2021-08-31 | Stop reason: HOSPADM

## 2021-08-30 RX ORDER — SODIUM CHLORIDE 9 MG/ML
10 INJECTION INTRAVENOUS 2 TIMES DAILY
Status: DISCONTINUED | OUTPATIENT
Start: 2021-08-31 | End: 2021-08-30

## 2021-08-30 RX ORDER — ACETAMINOPHEN 325 MG/1
650 TABLET ORAL EVERY 6 HOURS PRN
Status: DISCONTINUED | OUTPATIENT
Start: 2021-08-30 | End: 2021-08-31 | Stop reason: HOSPADM

## 2021-08-30 RX ORDER — SODIUM CHLORIDE 9 MG/ML
25 INJECTION, SOLUTION INTRAVENOUS PRN
Status: DISCONTINUED | OUTPATIENT
Start: 2021-08-30 | End: 2021-08-31 | Stop reason: HOSPADM

## 2021-08-30 RX ADMIN — SODIUM CHLORIDE, PRESERVATIVE FREE 10 ML: 5 INJECTION INTRAVENOUS at 21:27

## 2021-08-30 RX ADMIN — SODIUM CHLORIDE 80 MG: 9 INJECTION, SOLUTION INTRAVENOUS at 21:27

## 2021-08-30 RX ADMIN — ENOXAPARIN SODIUM 40 MG: 40 INJECTION SUBCUTANEOUS at 21:26

## 2021-08-30 RX ADMIN — PIPERACILLIN AND TAZOBACTAM 3375 MG: 3; .375 INJECTION, POWDER, LYOPHILIZED, FOR SOLUTION INTRAVENOUS at 22:49

## 2021-08-30 RX ADMIN — SODIUM CHLORIDE, POTASSIUM CHLORIDE, SODIUM LACTATE AND CALCIUM CHLORIDE: 600; 310; 30; 20 INJECTION, SOLUTION INTRAVENOUS at 22:07

## 2021-08-30 RX ADMIN — PIPERACILLIN SODIUM AND TAZOBACTAM SODIUM 4500 MG: 4; .5 INJECTION, POWDER, LYOPHILIZED, FOR SOLUTION INTRAVENOUS at 15:28

## 2021-08-30 RX ADMIN — SODIUM CHLORIDE 1000 ML: 9 INJECTION, SOLUTION INTRAVENOUS at 10:29

## 2021-08-30 RX ADMIN — IOPAMIDOL 90 ML: 755 INJECTION, SOLUTION INTRAVENOUS at 12:41

## 2021-08-30 RX ADMIN — MORPHINE SULFATE 4 MG: 4 INJECTION, SOLUTION INTRAMUSCULAR; INTRAVENOUS at 10:29

## 2021-08-30 ASSESSMENT — PAIN DESCRIPTION - FREQUENCY
FREQUENCY: INTERMITTENT
FREQUENCY: INTERMITTENT

## 2021-08-30 ASSESSMENT — PAIN SCALES - GENERAL
PAINLEVEL_OUTOF10: 4
PAINLEVEL_OUTOF10: 5
PAINLEVEL_OUTOF10: 5
PAINLEVEL_OUTOF10: 0
PAINLEVEL_OUTOF10: 0
PAINLEVEL_OUTOF10: 2

## 2021-08-30 ASSESSMENT — ENCOUNTER SYMPTOMS
ABDOMINAL DISTENTION: 0
SORE THROAT: 0
VOMITING: 1
EYE PAIN: 0
WHEEZING: 0
BACK PAIN: 0
SHORTNESS OF BREATH: 0
EYE DISCHARGE: 0
ABDOMINAL PAIN: 1
DIARRHEA: 0
COUGH: 0
EYE REDNESS: 0
NAUSEA: 1
SINUS PRESSURE: 0

## 2021-08-30 ASSESSMENT — PAIN DESCRIPTION - LOCATION
LOCATION: ABDOMEN

## 2021-08-30 ASSESSMENT — PAIN DESCRIPTION - ORIENTATION
ORIENTATION: UPPER
ORIENTATION: RIGHT;UPPER
ORIENTATION: UPPER;MID

## 2021-08-30 ASSESSMENT — PAIN DESCRIPTION - PAIN TYPE
TYPE: ACUTE PAIN
TYPE: ACUTE PAIN

## 2021-08-30 ASSESSMENT — PAIN DESCRIPTION - DESCRIPTORS
DESCRIPTORS: DISCOMFORT
DESCRIPTORS: DISCOMFORT

## 2021-08-30 ASSESSMENT — PAIN SCALES - WONG BAKER: WONGBAKER_NUMERICALRESPONSE: 2

## 2021-08-30 NOTE — ED NOTES
Bed: 07  Expected date: 8/30/21  Expected time:   Means of arrival: JESSIKA  Comments:  JESSIKA Torres RN  08/30/21 [id number]

## 2021-08-30 NOTE — H&P
Hospitalist History & Physical      PCP: No primary care provider on file. Date of Admission: 8/30/2021    Date of Service: Pt seen/examined on 8/30/2021 and is admitted to Inpatient with expected LOS greater than two midnights due to medical therapy. Chief Complaint:  had concerns including Abdominal Pain (epigastric burning & RUQ tenderness x 2 weeks). History Of Present Illness:    Ms. Byron Vazquez is a 80y.o. year old female  who  has a past medical history of Abdominal pain, Anxiety, PONV (postoperative nausea and vomiting), Severe sepsis ([address] and Sinus tachycardia. She has a extensive history including cholecystectomy, ERCP sphincterotomy and stent placement with subsequent removal complicated with post ERCP pancreatitis (2015), biliary sepsis s/p ERCP with stent placement (2018). She presented to the ER on 8/30/2021 with complaints of abdominal pain x2 weeks. She states that the pain feels like a \"gnawing pain that starts in my epigastric area and radiates underneath my right rib cage. \"  Vital signs were stable. Labs were unremarkable with the exception of alk phos 122. UA was positive for trace leukocytes, microscopy showed rare bacteria. CT of the abdomen and pelvis showed persistent significant biliary dilation with pneumobilia and moderate pancreatic duct dilation without a discrete pancreatic head mass. GI was consulted and the case was discussed with Dr. Candy Galvez. She was given Zosyn, 1 L normal saline and 4 mg of morphine while in the ED.             Past Medical History:        Diagnosis Date    Abdominal pain     Anxiety     PONV (postoperative nausea and vomiting)     Severe sepsis ([address] 11/15/2018    Sinus tachycardia 11/15/2018       Past Surgical History:        Procedure Laterality Date    CHOLECYSTECTOMY      COLONOSCOPY      ENDOSCOPY, COLON, DIAGNOSTIC      ERCP  6-24-15    ballon extraction with stent insertion    ERCP  4/29/2019    ERCP STENT REMOVAL performed by Baldev Ha MD at [address] [facility] ERCP  4/29/2019    ERCP STONE REMOVAL performed by Baldev Ha MD at [facility] Bilateral     cataracts    [place] ERCP BILIARY/PANC DUCT STENT EXCHANGE W/DIL&WIRE N/A 11/15/2018    ERCP ENDOSCOPIC RETROGRADE CHOLANGIOPANCREATOGRAPHY 7a7 stent 15-18 balloon sweep stone removal performed by Baldev Ha MD at [address]       Medications Prior to Admission:      Prior to Admission medications    Medication Sig Start Date End Date Taking? Authorizing Provider   Probiotic CAPS Take 1 capsule by mouth daily   Yes Historical Provider, MD   clorazepate (TRANXENE) 7.5 MG tablet Take 7.5 mg by mouth 2 times daily as needed for Anxiety. Yes Historical Provider, MD       Allergies: Other    Social History:    RESIDENCE: Private residence  TOBACCO:   reports that she has never smoked. She has never used smokeless tobacco.  ETOH:   reports no history of alcohol use. Family History:    As follows:      Problem Relation Age of Onset    Other Mother     Cancer Father        REVIEW OF SYSTEMS:   Pertinent positives as noted in the HPI. All other systems reviewed and negative. PHYSICAL EXAM:  BP (!) 143/85   Pulse 68   Temp 97.5 °F (36.4 °C) (Oral)   Resp 20   Ht 4' 11\" (1.499 m)   Wt 104 lb (47.2 kg)   SpO2 98%   BMI 21.01 kg/m²   General appearance: No apparent distress, appears stated age and cooperative. HEENT: Normal cephalic, atraumatic without obvious deformity. Pupils equal, round, and reactive to light. Neck: Supple, with full range of motion. No jugular venous distention. Trachea midline. Respiratory:  Clear to auscultation bilaterally. No apparent distress on room air. Cardiovascular:  Regular rate and rhythm. S1, S2 without murmurs, rubs, or gallops. Bedside cardiac monitor displaying normal sinus rhythm. PV: Brisk capillary refill. +2 pedal and radial pulses bilaterally.  No clubbing, cyanosis, edema of bilateral lower extremities. Abdomen: Soft, + tenderness, non-distended. +BS  Musculoskeletal: No obvious deformities or erythematous or edematous joints. Skin: Normal skin color. No rashes or lesions. Neurologic:  Neurovascularly intact without any focal sensory/motor deficits. Psychiatric: Calm and cooperative        CBC:   Recent Labs     08/30/21  1017   WBC 5.1   RBC 4.65   HGB 13.2   HCT 39.7   MCV 85.4   RDW 13.0        BMP:   Recent Labs     08/30/21  1017      K 3.7      CO2 27   BUN 12   CREATININE 1.0     LFT:  Recent Labs     08/30/21  1017   PROT 6.9   ALKPHOS 122*   ALT 10   AST 19   BILITOT 0.5   LIPASE 34     CE:  No results for input(s): Usha Valle in the last 72 hours. PT/INR: No results for input(s): INR, APTT in the last 72 hours. BNP: No results for input(s): BNP in the last 72 hours. ESR: No results found for: SEDRATE  CRP: No results found for: CRP  D Dimer: No results found for: DDIMER   Folate and B12: No results found for: [id number], No results found for: FOLATE  Lactic Acid:   Lab Results   Component Value Date    LACTA 1.7 08/30/2021     Thyroid Studies: No results found for: TSH, Qiana Solian    Oupatient labs:  Lab Results   Component Value Date    INR 1.1 11/19/2018       Urinalysis:    Lab Results   Component Value Date    NITRU Negative 08/30/2021    WBCUA 1-3 08/30/2021    BACTERIA RARE 08/30/2021    RBCUA 0-1 08/30/2021    BLOODU SMALL 08/30/2021    SPECGRAV 1.025 08/30/2021    GLUCOSEU Negative 08/30/2021       Imaging:  CT ABDOMEN PELVIS W IV CONTRAST Additional Contrast? None    Addendum Date: 8/30/2021    ADDENDUM: Filling defects are identified in the superficial femoral veins likely flow related artifact rather than DVT. Consider ultrasonography. Result Date: 8/30/2021  Persistent significant biliary dilatation with pneumobilia and moderate pancreatic ductal dilatation (double duct sign) without a discrete pancreatic head mass. Consider MRCP or ERCP to evaluate for potential stricture or occult ampullary malignancy Diverticulosis of colon without diverticulitis. There is constipation         ASSESSMENT/PLAN:    Pneumobilia-concern for possible ascending cholangitis. GI was consulted. Given Zosyn and 1 L normal saline bolus in the ED. Continue Zosyn. As needed analgesia. History of cholecystectomy, ERCP sphincterotomy and stent placement with subsequent removal complicated with post ERCP pancreatitis (2015), biliary sepsis s/p ERCP with stent placement (2018). Patient states that in 2015 she suffered a duodenal perforation however per [facility] note there was no perforation. Diet: No diet orders on file  Code Status: Prior  Surrogate decision maker confirmed with patient:   Extended Emergency Contact Information  Primary Emergency Contact: Marcio Gonzalez States [address] Phone: [phone]  Relation: Other  Secondary Emergency Contact: Juliana [employer]: [phone]  Relation: Grandchild  Preferred language: English   needed? No    DVT Prophylaxis: [x]Lovenox []Heparin []PCD [] [address] []Encouraged ambulation  Disposition: [x]Med/Surg [] Intermediate [] ICU/CCU  Admit status: [] Observation [x] Inpatient     +++++++++++++++++++++++++++++++++++++++++++++++++  [facility], [address]  +++++++++++++++++++++++++++++++++++++++++++++++++  NOTE: This report was transcribed using voice recognition software. Every effort was made to ensure accuracy; however, inadvertent computerized transcription errors may be present.

## 2021-08-30 NOTE — ED PROVIDER NOTES
Chief Complaint   Patient presents with    Abdominal Pain     epigastric burning & RUQ tenderness x 2 weeks       Patient is 80-year female presents today with epigastric and right upper quadrant abdominal pain. Symptoms have been intermittent for the past 2 weeks. Symptoms not made better worse by anything specific. Patient is history of previous cholecystectomy as well as previous biliary stent was placed multiple years ago. Nurses nausea with vomiting. Denies diarrhea constipation. Medication for the symptoms. Denies cough, fevers or chills. The history is provided by the patient. No  was used. Review of Systems   Constitutional: Negative for chills and fever. HENT: Negative for ear pain, sinus pressure and sore throat. Eyes: Negative for pain, discharge and redness. Respiratory: Negative for cough, shortness of breath and wheezing. Cardiovascular: Negative for chest pain. Gastrointestinal: Positive for abdominal pain, nausea and vomiting. Negative for abdominal distention and diarrhea. Genitourinary: Negative for dysuria and frequency. Musculoskeletal: Negative for arthralgias and back pain. Skin: Negative for rash and wound. Neurological: Negative for weakness and headaches. Hematological: Negative for adenopathy. All other systems reviewed and are negative. Physical Exam  Vitals and nursing note reviewed. Constitutional:       General: She is not in acute distress. Appearance: Normal appearance. She is well-developed. She is not ill-appearing. HENT:      Head: Normocephalic and atraumatic. Eyes:      Pupils: Pupils are equal, round, and reactive to light. Cardiovascular:      Rate and Rhythm: Normal rate and regular rhythm. Pulses: Normal pulses. Pulmonary:      Effort: Pulmonary effort is normal. No respiratory distress. Breath sounds: Normal breath sounds. No wheezing or rales.    Abdominal:      General: Bowel sounds are normal.      Palpations: Abdomen is soft. Tenderness: There is abdominal tenderness (epigastric and RUQ pain). There is no guarding or rebound. Musculoskeletal:      Cervical back: Normal range of motion and neck supple. Skin:     General: Skin is warm and dry. Capillary Refill: Capillary refill takes less than 2 seconds. Neurological:      General: No focal deficit present. Mental Status: She is alert and oriented to person, place, and time. Cranial Nerves: No cranial nerve deficit. Coordination: Coordination normal.          Procedures     Labs Reviewed   COMPREHENSIVE METABOLIC PANEL W/ REFLEX TO MG FOR LOW K - Abnormal; Notable for the following components:       Result Value    Glucose 102 (*)     Alkaline Phosphatase 122 (*)     All other components within normal limits   URINALYSIS - Abnormal; Notable for the following components:    Blood, Urine SMALL (*)     Leukocyte Esterase, Urine TRACE (*)     All other components within normal limits   MICROSCOPIC URINALYSIS - Abnormal; Notable for the following components:    Bacteria, UA RARE (*)     All other components within normal limits   CBC WITH AUTO DIFFERENTIAL   LIPASE   LACTIC ACID, PLASMA     CT ABDOMEN PELVIS W IV CONTRAST Additional Contrast? None   Final Result   Addendum 1 of 1   ADDENDUM:   Filling defects are identified in the superficial femoral veins likely    flow   related artifact rather than DVT. Consider ultrasonography. Final   Persistent significant biliary dilatation with pneumobilia and moderate   pancreatic ductal dilatation (double duct sign) without a discrete pancreatic   head mass. Consider MRCP or ERCP to evaluate for potential stricture or   occult ampullary malignancy      Diverticulosis of colon without diverticulitis. There is constipation              EKG #1:  I personally interpreted this EKG  Time:  1011    Rate: 75  Rhythm: Sinus.   Interpretation: Normal sinus rhythm, left axis deviation, no ST elevation, no T wave inversion. MDM  Number of Diagnoses or Management Options  Pneumobilia  Right upper quadrant abdominal pain  Diagnosis management comments: Patient is 80-year female presents with right upper quadrant abdominal pain. Physical exam shows mild tenderness palpation of this area. Labs and imaging ordered. Labs are within normal limits, white count and liver enzymes are elevated. CT imaging does show concern for pneumobilia. I did speak with GI Dr. Robel Lund who will evaluate the patient. Patient was given IV antibiotics for concern of possible ascending cholangitis. PCP was consulted agrees to admit. Vitals have remained stable. Pain is been well controlled. Amount and/or Complexity of Data Reviewed  Clinical lab tests: reviewed  Tests in the radiology section of CPT®: reviewed  Tests in the medicine section of CPT®: reviewed                  --------------------------------------------- PAST HISTORY ---------------------------------------------  Past Medical History:  has a past medical history of Abdominal pain, Anxiety, PONV (postoperative nausea and vomiting), Severe sepsis ([address] and Sinus tachycardia. Past Surgical History:  has a past surgical history that includes Cholecystectomy; Colonoscopy; Endoscopy, colon, diagnostic; ERCP (6-24-15); pr ercp biliary/panc duct stent exchange w/dil&wire (N/A, 11/15/2018); eye surgery (Bilateral); ERCP (4/29/2019); and ERCP (4/29/2019). Social History:  reports that she has never smoked. She has never used smokeless tobacco. She reports that she does not drink alcohol and does not use drugs. Family History: family history includes Cancer in her father; Other in her mother. The patients home medications have been reviewed. Allergies:  Other    -------------------------------------------------- RESULTS -------------------------------------------------    LABS:  Results for orders placed or performed during the hospital encounter of 08/30/21   CBC Auto Differential   Result Value Ref Range    WBC 5.1 4.5 - 11.5 E9/L    RBC 4.65 3.50 - 5.50 E12/L    Hemoglobin 13.2 11.5 - 15.5 g/dL    Hematocrit 39.7 34.0 - 48.0 %    MCV 85.4 80.0 - 99.9 fL    MCH 28.4 26.0 - 35.0 pg    MCHC 33.2 32.0 - 34.5 %    RDW 13.0 11.5 - 15.0 fL    Platelets 851 668 - 736 E9/L    MPV 9.3 7.0 - 12.0 fL    Neutrophils % 47.7 43.0 - 80.0 %    Immature Granulocytes % 0.8 0.0 - 5.0 %    Lymphocytes % 37.7 20.0 - 42.0 %    Monocytes % 10.4 2.0 - 12.0 %    Eosinophils % 2.8 0.0 - 6.0 %    Basophils % 0.6 0.0 - 2.0 %    Neutrophils Absolute 2.43 1.80 - 7.30 E9/L    Immature Granulocytes # 0.04 E9/L    Lymphocytes Absolute 1.92 1.50 - 4.00 E9/L    Monocytes Absolute 0.53 0.10 - 0.95 E9/L    Eosinophils Absolute 0.14 0.05 - 0.50 E9/L    Basophils Absolute 0.03 0.00 - 0.20 E9/L   Comprehensive Metabolic Panel w/ Reflex to MG   Result Value Ref Range    Sodium 140 132 - 146 mmol/L    Potassium reflex Magnesium 3.7 3.5 - 5.0 mmol/L    Chloride 103 98 - 107 mmol/L    CO2 27 22 - 29 mmol/L    Anion Gap 10 7 - 16 mmol/L    Glucose 102 (H) 74 - 99 mg/dL    BUN 12 6 - 23 mg/dL    CREATININE 1.0 0.5 - 1.0 mg/dL    GFR Non-African American 53 >=60 mL/min/1.73    GFR African American >60     Calcium 9.3 8.6 - 10.2 mg/dL    Total Protein 6.9 6.4 - 8.3 g/dL    Albumin 4.0 3.5 - 5.2 g/dL    Total Bilirubin 0.5 0.0 - 1.2 mg/dL    Alkaline Phosphatase 122 (H) 35 - 104 U/L    ALT 10 0 - 32 U/L    AST 19 0 - 31 U/L   Lipase   Result Value Ref Range    Lipase 34 13 - 60 U/L   Lactic Acid, Plasma   Result Value Ref Range    Lactic Acid 1.7 0.5 - 2.2 mmol/L   Urinalysis, reflex to microscopic   Result Value Ref Range    Color, UA Yellow Straw/Yellow    Clarity, UA Clear Clear    Glucose, Ur Negative Negative mg/dL    Bilirubin Urine Negative Negative    Ketones, Urine Negative Negative mg/dL    Specific Gravity, UA 1.025 1.005 - 1.030    Blood, Urine SMALL (A) Negative    pH, UA 6.0 5.0 - 9.0    Protein, UA Negative Negative mg/dL    Urobilinogen, Urine 0.2 <2.0 E.U./dL    Nitrite, Urine Negative Negative    Leukocyte Esterase, Urine TRACE (A) Negative   Microscopic Urinalysis   Result Value Ref Range    WBC, UA 1-3 0 - 5 /HPF    RBC, UA 0-1 0 - 2 /HPF    Epithelial Cells, UA FEW /HPF    Bacteria, UA RARE (A) None Seen /HPF   EKG 12 Lead   Result Value Ref Range    Ventricular Rate 75 BPM    Atrial Rate 75 BPM    P-R Interval 152 ms    QRS Duration 92 ms    Q-T Interval 368 ms    QTc Calculation (Bazett) 410 ms    P Axis 72 degrees    R Axis -51 degrees    T Axis 31 degrees       RADIOLOGY:  CT ABDOMEN PELVIS W IV CONTRAST Additional Contrast? None   Final Result   Addendum 1 of 1   ADDENDUM:   Filling defects are identified in the superficial femoral veins likely    flow   related artifact rather than DVT. Consider ultrasonography. Final   Persistent significant biliary dilatation with pneumobilia and moderate   pancreatic ductal dilatation (double duct sign) without a discrete pancreatic   head mass. Consider MRCP or ERCP to evaluate for potential stricture or   occult ampullary malignancy      Diverticulosis of colon without diverticulitis. There is constipation                    ------------------------- NURSING NOTES AND VITALS REVIEWED ---------------------------  Date / Time Roomed:  8/30/2021  9:55 AM  ED Bed Assignment:  06/06    The nursing notes within the ED encounter and vital signs as below have been reviewed.      Patient Vitals for the past 24 hrs:   BP Temp Temp src Pulse Resp SpO2 Height Weight   08/30/21 1534 (!) 143/85 97.5 °F (36.4 °C) Oral 68 20 98 % -- --   08/30/21 1340 (!) 144/60 98.3 °F (36.8 °C) Oral 81 19 95 % -- --   08/30/21 1127 (!) 167/70 97.9 °F (36.6 °C) Oral 76 19 98 % -- --   08/30/21 0958 (!) 152/91 96.9 °F (36.1 °C) -- 85 16 97 % 4' 11\" (1.499 m) 104 lb (47.2 kg)       Oxygen Saturation Interpretation:

## 2021-08-30 NOTE — LETTER
[address] Medicaid  CERTIFICATION OF NECESSITY  FOR TRANSPORTATION   BY WHEELCHAIR VAN     Individual Information   1. Name: Don Tee 2. [address] [place] [address] Dr Medicaid Billing Number:    3. Address: [address] Provider Information   4. Provider Name: Crispin Richard   5. [address] [place] [address] Dr Medicaid Provider Number: National Provider Identifier (NPI):      Certification  7. Criteria:  By signing this document, the practitioner certifies that two statements are true:  A. This individual must be accompanied by a mobility-related assistive device from the point of pick-up to the point of drop-off. B. Transport of this individual by standard passenger vehicle or common carrier is precluded or contraindicated. 8. Period Beginning Date: 8/31/2021     9. Length  [x] Not more than 10 day(s)  [] One Year     Additional Information Relevant to Certification   10. Comments or Explanations, If Necessary or Appropriate          Certifying Practitioner Information   11. Name of Dottie [facility]   12. [address] [place] [address] Dr Medicaid Provider Number, If Applicable:  [address] Provider Identifier (NPI):      Signature Information   14. Date of Signature: 8/31/2021   15. Name of Person Signing: Electronically signed by Darvin Harris RN on 8/31/2021 at 2:18 PM     16. Signature and Professional Designation: Electronically signed by Darvin Harris RN on 8/31/2021 at 2:18 PM       [facility] [id number]  Rev. 7/2015    [address] Encounter Date/Time: 8/30/2021 [address] Account: [de-identified]    MRN: [id number]    Patient: Don Tee    Contact Serial #: [id number]      ENCOUNTER          Patient Class: I Private Enc?   No Unit RM BD: [facility] [id number]   [facility] Service: Med/Surg   Encounter DX: Pneumobilia [K83.8]   ADM Provider: Sonja Alejandro MD   Procedure:     ATT Provider: Janneth Ochoa DO   REF Provider:        Admission DX: Pneumobilia, Right upper quadrant abdominal pain and DX codes: K83.8, R10.11      PATIENT                 Name: Norris Self : 1935 (86 yrs)   Address[address],  Sex: Female   City: [place] [address]         Marital Status:    Employer: RETIRED         Mandaeism: None   Primary Care Provider:           Primary Phone: [phone]   EMERGENCY CONTACT   Contact Name Legal Guardian? Relationship to Patient Home Phone Work Phone   1. Jerrica Veras  2. SandipAliyah    No Other  Grandchild [phone]                 GUARANTOR            Guarantor: Amirah Selbybrad     : 1935   Address: [address] Sex: Female   [place] [id number]     Relation to Patient: Self       Home Phone: [phone]   Guarantor ID: [id number]       Work Phone:     Guarantor Employer: RETIRED         Status: RETIRED      COVERAGE        PRIMARY INSURANCE   Payor: [facility] MEDICARE Plan: Shayy Elam [place]*   Payor Address: ,          Group Number: Stacy Fair[employer] Insurance Type: INDEMNITY   Subscriber Name: Penelope Bocanegra : 1935   Subscriber ID: [id number] Pat. Rel. to Sub: Self   SECONDARY INSURANCE   Payor: [facility] MEDICARE Plan: ANTHEM MEDIBLUE DUAL ADV*   Payor Address:  [facility] [id number]          Group Number: [address] Type: INDEMNITY   Subscriber Name: Penelope Bocanegra : 1935   Subscriber ID: [id number] Soledad Last.  Rel. to Sub: SELF

## 2021-08-30 NOTE — CONSULTS
History and Physical      ASSESSMENT AND PLAN:    86y/F w/ history of biliary obstruction 2/2 choledocholithiasis s/p ERCP w/ stent placement at one point c/b post-ERCP pancreatitis and possible duodenal tear w/ last stenting occurring in 2018 who presents w/ persistent discomfort in her epigastrium with some pain in her RUQ. LFTs normal on arrival.     Pneumobilia on CT in the setting of previous ERCP suggests patency and adequate drainage of biliary system. Her symptoms are more suspicious for reflux/dyspepsia. With that being said, a double duct sign in this setting will require further evaluation. Plan:  -Please continue supportive care. -Please continue trending LFTs daily  -Okay to begin CLD and advance to low-fat diet as tolerated. -Please give 80mg IV Pantoprazole once followed by 40mg IV BID while inpatient.  -Please d/c patient on 40mg Omeprazole BID. -Please ensure daily bowel regimen w/ Miralax titrated to having one, formed, relieving bowel movement daily.  -I will arrange for outpatient clinic follow up and outpatient EGD/EUS and possible ERCP based on EUS findings. Thank you for including us in the care of this patient. Please do not hesitate to contact us with any additional questions or concerns. Fredo Kern MD  Gastroenterology/Hepatology  Advanced Endoscopy          HISTORY OF PRESENT ILLNESS:      Ms. Leyla Day is an 86y/F w/ hx of cholangitis in the setting of choledocolithiasis s/p ERCP complicated by acute pancreatitis and possible duodenal perforation per report in 2015 with required repeat biliary stenting required in 2018. She is currently stent-free. She is s/p CCY. She mentions that she has had progressively worsening pain in her epigastric area for the past 2-3 weeks. She feels as though food gets stuck/has a deep pressure in her epigastric region w/ PO intake. She denies full dysphagia to the point of not being able to swallow or needing to regurgitate food.  She Khurram Pastor MD    Current Outpatient Medications:     Probiotic CAPS, Take 1 capsule by mouth daily, Disp: , Rfl:     clorazepate (TRANXENE) 7.5 MG tablet, Take 7.5 mg by mouth 2 times daily as needed for Anxiety. , Disp: , Rfl:      Allergies: Other    ROS:  General: Patient denies n/v/f/c or weight loss. HEENT: Patient denies persistent postnasal drip, scleral icterus, drooling, persistent bleeding from nose/mouth. Resp: Patient denies SOB, wheezing, productive cough. Cards: Patient denies CP, palpitations, significant edema  GI: As above. Derm: Patient denies jaundice/rashes. Musc: Patient denies diffuse/irregular joint swelling or myalgias.      PHYSICAL EXAM:  BP (!) 143/85   Pulse 68   Temp 97.5 °F (36.4 °C) (Oral)   Resp 20   Ht 4' 11\" (1.499 m)   Wt 104 lb (47.2 kg)   SpO2 98%   BMI 21.01 kg/m²   General: Overall well-appearing, NAD  HEENT: PERRLA, EOMI, Anicteric sclera, MMM  Cards: RRR, no murmur  Resp: CTAB, no wheezing, good air movement  Abdomen: Soft, ND, NT, Murrell's sign negative  Extremities: No edema or joint effusions, adequate gait  Skin: No rashes or jaundice  Neuro: A&O x 3, CN grossly intact, non-focal exam               Electronically signed by Melissa Schneider MD on 8/30/2021 at 5:20 PM

## 2021-08-31 VITALS
TEMPERATURE: 98.4 F | WEIGHT: 104 LBS | HEIGHT: 59 IN | SYSTOLIC BLOOD PRESSURE: 145 MMHG | BODY MASS INDEX: 20.96 KG/M2 | OXYGEN SATURATION: 94 % | DIASTOLIC BLOOD PRESSURE: 61 MMHG | HEART RATE: 85 BPM | RESPIRATION RATE: 16 BRPM

## 2021-08-31 LAB
ALBUMIN SERPL-MCNC: 3.8 G/DL (ref 3.5–5.2)
ALP BLD-CCNC: 112 U/L (ref 35–104)
ALT SERPL-CCNC: 10 U/L (ref 0–32)
ANION GAP SERPL CALCULATED.3IONS-SCNC: 11 MMOL/L (ref 7–16)
AST SERPL-CCNC: 23 U/L (ref 0–31)
BASOPHILS ABSOLUTE: 0.05 E9/L (ref 0–0.2)
BASOPHILS RELATIVE PERCENT: 0.6 % (ref 0–2)
BILIRUB SERPL-MCNC: 1 MG/DL (ref 0–1.2)
BUN BLDV-MCNC: 12 MG/DL (ref 6–23)
CALCIUM SERPL-MCNC: 9.2 MG/DL (ref 8.6–10.2)
CHLORIDE BLD-SCNC: 104 MMOL/L (ref 98–107)
CO2: 24 MMOL/L (ref 22–29)
CREAT SERPL-MCNC: 1 MG/DL (ref 0.5–1)
EOSINOPHILS ABSOLUTE: 0.07 E9/L (ref 0.05–0.5)
EOSINOPHILS RELATIVE PERCENT: 0.9 % (ref 0–6)
GFR AFRICAN AMERICAN: >60
GFR NON-AFRICAN AMERICAN: 53 ML/MIN/1.73
GLUCOSE BLD-MCNC: 93 MG/DL (ref 74–99)
HCT VFR BLD CALC: 38.3 % (ref 34–48)
HEMOGLOBIN: 12.7 G/DL (ref 11.5–15.5)
IMMATURE GRANULOCYTES #: 0.05 E9/L
IMMATURE GRANULOCYTES %: 0.6 % (ref 0–5)
LYMPHOCYTES ABSOLUTE: 1.93 E9/L (ref 1.5–4)
LYMPHOCYTES RELATIVE PERCENT: 23.5 % (ref 20–42)
MCH RBC QN AUTO: 28.5 PG (ref 26–35)
MCHC RBC AUTO-ENTMCNC: 33.2 % (ref 32–34.5)
MCV RBC AUTO: 85.9 FL (ref 80–99.9)
MONOCYTES ABSOLUTE: 0.76 E9/L (ref 0.1–0.95)
MONOCYTES RELATIVE PERCENT: 9.2 % (ref 2–12)
NEUTROPHILS ABSOLUTE: 5.36 E9/L (ref 1.8–7.3)
NEUTROPHILS RELATIVE PERCENT: 65.2 % (ref 43–80)
PDW BLD-RTO: 12.9 FL (ref 11.5–15)
PLATELET # BLD: 181 E9/L (ref 130–450)
PMV BLD AUTO: 9.8 FL (ref 7–12)
POTASSIUM REFLEX MAGNESIUM: 4 MMOL/L (ref 3.5–5)
RBC # BLD: 4.46 E12/L (ref 3.5–5.5)
SODIUM BLD-SCNC: 139 MMOL/L (ref 132–146)
TOTAL PROTEIN: 6.5 G/DL (ref 6.4–8.3)
WBC # BLD: 8.2 E9/L (ref 4.5–11.5)

## 2021-08-31 PROCEDURE — 85025 COMPLETE CBC W/AUTO DIFF WBC: CPT

## 2021-08-31 PROCEDURE — 2580000003 HC RX 258: Performed by: NURSE PRACTITIONER

## 2021-08-31 PROCEDURE — 36415 COLL VENOUS BLD VENIPUNCTURE: CPT

## 2021-08-31 PROCEDURE — 6360000002 HC RX W HCPCS: Performed by: NURSE PRACTITIONER

## 2021-08-31 PROCEDURE — 96366 THER/PROPH/DIAG IV INF ADDON: CPT

## 2021-08-31 PROCEDURE — G0378 HOSPITAL OBSERVATION PER HR: HCPCS

## 2021-08-31 PROCEDURE — 6370000000 HC RX 637 (ALT 250 FOR IP): Performed by: INTERNAL MEDICINE

## 2021-08-31 PROCEDURE — 80053 COMPREHEN METABOLIC PANEL: CPT

## 2021-08-31 PROCEDURE — 96372 THER/PROPH/DIAG INJ SC/IM: CPT

## 2021-08-31 RX ORDER — OMEPRAZOLE 40 MG/1
40 CAPSULE, DELAYED RELEASE ORAL EVERY 12 HOURS
Qty: 60 CAPSULE | Refills: 3 | Status: SHIPPED | OUTPATIENT
Start: 2021-08-31

## 2021-08-31 RX ADMIN — PIPERACILLIN AND TAZOBACTAM 3375 MG: 3; .375 INJECTION, POWDER, LYOPHILIZED, FOR SOLUTION INTRAVENOUS at 06:24

## 2021-08-31 RX ADMIN — PANTOPRAZOLE SODIUM 40 MG: 40 TABLET, DELAYED RELEASE ORAL at 06:23

## 2021-08-31 RX ADMIN — ENOXAPARIN SODIUM 40 MG: 40 INJECTION SUBCUTANEOUS at 09:09

## 2021-08-31 ASSESSMENT — PAIN SCALES - GENERAL: PAINLEVEL_OUTOF10: 0

## 2021-08-31 NOTE — PROGRESS NOTES
CLINICAL PHARMACY NOTE: MEDS TO BEDS    Total # of Prescriptions Filled: 1   The following medications were delivered to the patient:  · Omeprazole 40 mg    Additional Documentation:  Delivered meds to patient @1:00
100

## 2021-08-31 NOTE — PLAN OF CARE
Problem: Pain:  Goal: Pain level will decrease  Description: Pain level will decrease  8/31/2021 0827 by Stacy Russell RN  Outcome: Met This Shift  8/30/2021 2316 by Sophia Rodriguez RN  Outcome: Met This Shift  Goal: Control of acute pain  Description: Control of acute pain  8/31/2021 0827 by Stacy Russell RN  Outcome: Met This Shift  8/30/2021 2316 by Sophia Rodriguez RN  Outcome: Met This Shift  Goal: Control of chronic pain  Description: Control of chronic pain  Outcome: Met This Shift     Problem: Falls - Risk of:  Goal: Will remain free from falls  Description: Will remain free from falls  Outcome: Met This Shift  Goal: Absence of physical injury  Description: Absence of physical injury  Outcome: Met This Shift

## 2021-09-01 NOTE — DISCHARGE SUMMARY
Hospitalist Discharge Summary    Patient ID: Ruby Barrera   Patient : 1935  Patient's PCP: No primary care provider on file. Admit Date: 2021   Admitting Physician: Shirley Vincent MD    Discharge Date:  2021   Discharge Physician: Michael Mayers DO   Discharge Condition: Stable  Discharge Disposition: [facility] course in brief:  (Please refer to daily progress notes for a comprehensive review of the hospitalization by requesting medical records)       86y/F w/ history of biliary obstruction 2/2 choledocholithiasis s/p ERCP w/ stent placement at one point c/b post-ERCP pancreatitis and possible duodenal tear w/ last stenting occurring in 2018 who presents w/ persistent discomfort in her epigastrium with some pain in her RUQ. LFTs normal on arrival.      Pneumobilia on CT in the setting of previous ERCP suggests patency and adequate drainage of biliary system. Her symptoms are more suspicious for reflux/dyspepsia. With that being said, a double duct sign in this setting will require further evaluation.     Plan:  -Please continue supportive care. -Please continue trending LFTs daily  -Okay to begin CLD and advance to low-fat diet as tolerated. -Please give 80mg IV Pantoprazole once followed by 40mg IV BID while inpatient.  -Please d/c patient on 40mg Omeprazole BID. -Please ensure daily bowel regimen w/ Miralax titrated to having one, formed, relieving bowel movement daily.  -I will arrange for outpatient clinic follow up and outpatient EGD/EUS and possible ERCP based on EUS findings.      Thank you for including us in the care of this patient. Please do not hesitate to contact us with any additional questions or concerns.     Machelle Rubio MD  Gastroenterology/Hepatology  Advanced Endoscopy      Discharge order noted. Patient with  past medical history of Abdominal pain, Anxiety, PONV (postoperative nausea and vomiting), Severe sepsis ([address] and Sinus tachycardia. was admitted with Pneumobilia-concern for possible ascending cholangitis. Per GI note today will arrange for outpatient clinic follow up and outpatient EGD/EUS and possible ERCP based on EUS findings. Met with patient and discussed role and transition of care. Patient lives alone in high rise apartment with elevator access. Is alert oriented independent and still drives, has no DME. Patient stated she currently does not have a PCP but is in  Process of establishing one. She uses [address] on [address] [facility]. Patient states she will need transportation to home today Transportation set up via [employer] for 4 pm today to patients home address. Charge nurse, RN, and patient notified. [employer] form in envelope in soft chart. Electronically signed by Marcella Chavez RN on 8/31/2021 at 2:40 PM      Consults:   IP CONSULT TO GI  IP CONSULT TO GI    Discharge Diagnoses:        Discharge Instructions / Follow up:    No future appointments. Continued appropriate risk factor modification of blood pressure, diabetes and serum lipids will remain essential to reducing risk of future atherosclerotic development    Activity: activity as tolerated    Significant labs:  CBC:   Recent Labs     08/30/21  1017 08/31/21  0433   WBC 5.1 8.2   RBC 4.65 4.46   HGB 13.2 12.7   HCT 39.7 38.3   MCV 85.4 85.9   RDW 13.0 12.9    181     BMP:   Recent Labs     08/30/21  1017 08/31/21  0433    139   K 3.7 4.0    104   CO2 27 24   BUN 12 12   CREATININE 1.0 1.0     LFT:  Recent Labs     08/30/21  1017 08/31/21  0433   PROT 6.9 6.5   ALKPHOS 122* 112*   ALT 10 10   AST 19 23   BILITOT 0.5 1.0   LIPASE 34  --      PT/INR: No results for input(s): INR, APTT in the last 72 hours. BNP: No results for input(s): BNP in the last 72 hours.   Hgb A1C: No results found for: LABA1C  Folate and B12: No results found for: MMSATCRA48, No results found for: FOLATE  Thyroid Studies: No results found for: TSH, T7HISHW, F1RDCQI, THYROIDAB    Urinalysis:    Lab Results   Component Value Date    NITRU Negative 08/30/2021    WBCUA 1-3 08/30/2021    BACTERIA RARE 08/30/2021    RBCUA 0-1 08/30/2021    BLOODU SMALL 08/30/2021    SPECGRAV 1.025 08/30/2021    GLUCOSEU Negative 08/30/2021       Imaging:  CT ABDOMEN PELVIS W IV CONTRAST Additional Contrast? None    Addendum Date: 8/30/2021    ADDENDUM: Filling defects are identified in the superficial femoral veins likely flow related artifact rather than DVT. Consider ultrasonography. Result Date: 8/30/2021  EXAMINATION: CT OF THE ABDOMEN AND PELVIS WITH CONTRAST 8/30/2021 12:51 pm TECHNIQUE: CT of the abdomen and pelvis was performed with the administration of intravenous contrast. Multiplanar reformatted images are provided for review. Dose modulation, iterative reconstruction, and/or weight based adjustment of the mA/kV was utilized to reduce the radiation dose to as low as reasonably achievable. COMPARISON: 11/18/2018 HISTORY: ORDERING SYSTEM PROVIDED HISTORY: epigastric and RUQ pain TECHNOLOGIST PROVIDED HISTORY: Additional Contrast?->None Reason for exam:->epigastric and RUQ pain Decision Support Exception - unselect if not a suspected or confirmed emergency medical condition->Emergency Medical Condition (MA) What reading provider will be dictating this exam?->CRC FINDINGS: The lung bases demonstrate atelectasis and scarring. There is extensive biliary dilatation with pneumobilia as before. The common bile duct is 1.7 cm in diameter. There is also pancreatic ductal dilatation measuring 5 mm (double duct sign). No discrete pancreatic head mass is identified. Spleen, the adrenals and the kidneys are normal except for a 1 cm cystic lesion in the left kidney. Consider surveillance. There is significant calcification aorta. Multiple compression fractures of the lower thoracic and lumbar spine are noted. Pelvis. The bladder is distended.   There is scattered diverticulosis of the left hemicolon without diverticulitis. There is constipation. Appendix cannot be identified. Persistent significant biliary dilatation with pneumobilia and moderate pancreatic ductal dilatation (double duct sign) without a discrete pancreatic head mass. Consider MRCP or ERCP to evaluate for potential stricture or occult ampullary malignancy Diverticulosis of colon without diverticulitis. There is constipation       Discharge Medications:      Medication List      START taking these medications    omeprazole 40 MG delayed release capsule  Commonly known as: PRILOSEC  Take 1 capsule by mouth every 12 hours        CONTINUE taking these medications    clorazepate 7.5 MG tablet  Commonly known as: TRANXENE     Probiotic Caps           Where to Get Your Medications      These medications were sent to [facility] [address] [address]    Phone: [phone]   · omeprazole 40 MG delayed release capsule         Time Spent on discharge is more than 45 minutes in the examination, evaluation, counseling and review of medications and discharge plan.    +++++++++++++++++++++++++++++++++++++++++++++++++  iMranda [address]  +++++++++++++++++++++++++++++++++++++++++++++++++  NOTE: This report was transcribed using voice recognition software. Every effort was made to ensure accuracy; however, inadvertent computerized transcription errors may be present.

## 2021-09-02 ENCOUNTER — TELEPHONE (OUTPATIENT)
Dept: GASTROENTEROLOGY | Age: 86
End: 2021-09-02

## 2021-09-02 NOTE — TELEPHONE ENCOUNTER
Called and spoke with patient to schedule f/u appt after ERCP. Pt does not wish to be scheduled at this time. Pt has conflicts with transportation and has no way to get to either [facility] or [place]. Pt states she was barely able to find a ride to get home from the hospital. Pt is not having any complications and is doing well. Instructed patient to call with any issues.    Electronically signed by Khalida Jo MA on 9/2/2021 at 10:43 AM

## 2021-09-22 ENCOUNTER — TELEPHONE (OUTPATIENT)
Dept: GASTROENTEROLOGY | Age: 86
End: 2021-09-22

## 2021-09-22 NOTE — TELEPHONE ENCOUNTER
Tried to reach patient to schedule hospital f/u.  LVM for patient that we are able to help with transportation to please call us back at her earliest convenience to be scheduled  Electronically signed by Maria R Amaya MA on 9/22/2021 at 10:32 AM

## 2023-04-07 PROBLEM — R74.01 TRANSAMINITIS: Status: ACTIVE | Noted: 2023-04-07

## 2025-07-12 ENCOUNTER — APPOINTMENT (OUTPATIENT)
Dept: CT IMAGING | Age: 89
DRG: 445 | End: 2025-07-12
Payer: MEDICARE

## 2025-07-12 ENCOUNTER — HOSPITAL ENCOUNTER (INPATIENT)
Age: 89
LOS: 3 days | Discharge: HOME | DRG: 445 | End: 2025-07-15
Attending: EMERGENCY MEDICINE
Payer: MEDICARE

## 2025-07-12 DIAGNOSIS — R11.2 NAUSEA AND VOMITING, UNSPECIFIED VOMITING TYPE: Primary | ICD-10-CM

## 2025-07-12 DIAGNOSIS — K80.50 CHOLEDOCHOLITHIASIS: ICD-10-CM

## 2025-07-12 PROBLEM — F41.9 ANXIETY: Chronic | Status: ACTIVE | Noted: 2025-07-12

## 2025-07-12 LAB
ALBUMIN SERPL-MCNC: 3.2 G/DL (ref 3.5–5.2)
ALBUMIN SERPL-MCNC: 4 G/DL (ref 3.5–5.2)
ALP SERPL-CCNC: 401 U/L (ref 35–104)
ALP SERPL-CCNC: 477 U/L (ref 35–104)
ALT SERPL-CCNC: 184 U/L (ref 0–35)
ALT SERPL-CCNC: 221 U/L (ref 0–35)
AMORPH SED URNS QL MICRO: PRESENT
ANION GAP SERPL CALCULATED.3IONS-SCNC: 15 MMOL/L (ref 7–16)
AST SERPL-CCNC: 291 U/L (ref 0–35)
AST SERPL-CCNC: 362 U/L (ref 0–35)
BACTERIA URNS QL MICRO: ABNORMAL
BASOPHILS # BLD: 0.04 K/UL (ref 0–0.2)
BASOPHILS NFR BLD: 0 % (ref 0–2)
BILIRUB DIRECT SERPL-MCNC: 0.8 MG/DL (ref 0–0.2)
BILIRUB INDIRECT SERPL-MCNC: 1.3 MG/DL (ref 0–1)
BILIRUB SERPL-MCNC: 1.8 MG/DL (ref 0–1.2)
BILIRUB SERPL-MCNC: 2.1 MG/DL (ref 0–1.2)
BILIRUB UR QL STRIP: NEGATIVE
BUN SERPL-MCNC: 14 MG/DL (ref 8–23)
CALCIUM SERPL-MCNC: 10.3 MG/DL (ref 8.8–10.2)
CHLORIDE SERPL-SCNC: 102 MMOL/L (ref 98–107)
CLARITY UR: CLEAR
CO2 SERPL-SCNC: 24 MMOL/L (ref 22–29)
COLOR UR: YELLOW
CREAT SERPL-MCNC: 0.9 MG/DL (ref 0.5–1)
EOSINOPHIL # BLD: 0.03 K/UL (ref 0.05–0.5)
EOSINOPHILS RELATIVE PERCENT: 0 % (ref 0–6)
ERYTHROCYTE [DISTWIDTH] IN BLOOD BY AUTOMATED COUNT: 12.1 % (ref 11.5–15)
GFR, ESTIMATED: 63 ML/MIN/1.73M2
GLUCOSE BLD-MCNC: 147 MG/DL (ref 74–99)
GLUCOSE SERPL-MCNC: 141 MG/DL (ref 74–99)
GLUCOSE UR STRIP-MCNC: NEGATIVE MG/DL
HCT VFR BLD AUTO: 40 % (ref 34–48)
HGB BLD-MCNC: 13.9 G/DL (ref 11.5–15.5)
HGB UR QL STRIP.AUTO: ABNORMAL
IMM GRANULOCYTES # BLD AUTO: 0.1 K/UL (ref 0–0.58)
IMM GRANULOCYTES NFR BLD: 1 % (ref 0–5)
KETONES UR STRIP-MCNC: NEGATIVE MG/DL
LACTATE BLDV-SCNC: 2.1 MMOL/L (ref 0.5–2.2)
LACTATE BLDV-SCNC: 2.6 MMOL/L (ref 0.5–2.2)
LEUKOCYTE ESTERASE UR QL STRIP: ABNORMAL
LIPASE SERPL-CCNC: 60 U/L (ref 13–60)
LYMPHOCYTES NFR BLD: 0.97 K/UL (ref 1.5–4)
LYMPHOCYTES RELATIVE PERCENT: 9 % (ref 20–42)
MCH RBC QN AUTO: 29.3 PG (ref 26–35)
MCHC RBC AUTO-ENTMCNC: 34.8 G/DL (ref 32–34.5)
MCV RBC AUTO: 84.2 FL (ref 80–99.9)
MONOCYTES NFR BLD: 0.61 K/UL (ref 0.1–0.95)
MONOCYTES NFR BLD: 6 % (ref 2–12)
NEUTROPHILS NFR BLD: 83 % (ref 43–80)
NEUTS SEG NFR BLD: 8.55 K/UL (ref 1.8–7.3)
NITRITE UR QL STRIP: NEGATIVE
PH UR STRIP: 8 [PH] (ref 5–8)
PLATELET # BLD AUTO: 245 K/UL (ref 130–450)
PMV BLD AUTO: 9.5 FL (ref 7–12)
POTASSIUM SERPL-SCNC: 3.7 MMOL/L (ref 3.5–5.1)
PROT SERPL-MCNC: 6.1 G/DL (ref 6.4–8.3)
PROT SERPL-MCNC: 7.5 G/DL (ref 6.4–8.3)
PROT UR STRIP-MCNC: NEGATIVE MG/DL
RBC # BLD AUTO: 4.75 M/UL (ref 3.5–5.5)
RBC #/AREA URNS HPF: ABNORMAL /HPF
SODIUM SERPL-SCNC: 141 MMOL/L (ref 136–145)
SP GR UR STRIP: 1.01 (ref 1–1.03)
TROPONIN I SERPL HS-MCNC: 10 NG/L (ref 0–14)
UROBILINOGEN UR STRIP-ACNC: 1 EU/DL (ref 0–1)
WBC #/AREA URNS HPF: ABNORMAL /HPF
WBC OTHER # BLD: 10.3 K/UL (ref 4.5–11.5)

## 2025-07-12 PROCEDURE — 6360000002 HC RX W HCPCS: Performed by: STUDENT IN AN ORGANIZED HEALTH CARE EDUCATION/TRAINING PROGRAM

## 2025-07-12 PROCEDURE — 1200000000 HC SEMI PRIVATE

## 2025-07-12 PROCEDURE — 99285 EMERGENCY DEPT VISIT HI MDM: CPT

## 2025-07-12 PROCEDURE — 87324 CLOSTRIDIUM AG IA: CPT

## 2025-07-12 PROCEDURE — 2500000003 HC RX 250 WO HCPCS: Performed by: INTERNAL MEDICINE

## 2025-07-12 PROCEDURE — 82962 GLUCOSE BLOOD TEST: CPT

## 2025-07-12 PROCEDURE — 80076 HEPATIC FUNCTION PANEL: CPT

## 2025-07-12 PROCEDURE — 83690 ASSAY OF LIPASE: CPT

## 2025-07-12 PROCEDURE — 93005 ELECTROCARDIOGRAM TRACING: CPT

## 2025-07-12 PROCEDURE — 6360000002 HC RX W HCPCS

## 2025-07-12 PROCEDURE — 80053 COMPREHEN METABOLIC PANEL: CPT

## 2025-07-12 PROCEDURE — 81001 URINALYSIS AUTO W/SCOPE: CPT

## 2025-07-12 PROCEDURE — 2580000003 HC RX 258

## 2025-07-12 PROCEDURE — 74177 CT ABD & PELVIS W/CONTRAST: CPT

## 2025-07-12 PROCEDURE — 99222 1ST HOSP IP/OBS MODERATE 55: CPT

## 2025-07-12 PROCEDURE — 96365 THER/PROPH/DIAG IV INF INIT: CPT

## 2025-07-12 PROCEDURE — 6370000000 HC RX 637 (ALT 250 FOR IP)

## 2025-07-12 PROCEDURE — 99223 1ST HOSP IP/OBS HIGH 75: CPT | Performed by: STUDENT IN AN ORGANIZED HEALTH CARE EDUCATION/TRAINING PROGRAM

## 2025-07-12 PROCEDURE — 2580000003 HC RX 258: Performed by: INTERNAL MEDICINE

## 2025-07-12 PROCEDURE — 96375 TX/PRO/DX INJ NEW DRUG ADDON: CPT

## 2025-07-12 PROCEDURE — 96376 TX/PRO/DX INJ SAME DRUG ADON: CPT

## 2025-07-12 PROCEDURE — 84484 ASSAY OF TROPONIN QUANT: CPT

## 2025-07-12 PROCEDURE — 87449 NOS EACH ORGANISM AG IA: CPT

## 2025-07-12 PROCEDURE — 6360000002 HC RX W HCPCS: Performed by: INTERNAL MEDICINE

## 2025-07-12 PROCEDURE — 6360000004 HC RX CONTRAST MEDICATION: Performed by: RADIOLOGY

## 2025-07-12 PROCEDURE — 83605 ASSAY OF LACTIC ACID: CPT

## 2025-07-12 PROCEDURE — 85025 COMPLETE CBC W/AUTO DIFF WBC: CPT

## 2025-07-12 PROCEDURE — 2500000003 HC RX 250 WO HCPCS: Performed by: STUDENT IN AN ORGANIZED HEALTH CARE EDUCATION/TRAINING PROGRAM

## 2025-07-12 RX ORDER — SODIUM CHLORIDE 9 MG/ML
INJECTION, SOLUTION INTRAVENOUS CONTINUOUS
Status: ACTIVE | OUTPATIENT
Start: 2025-07-12 | End: 2025-07-12

## 2025-07-12 RX ORDER — 0.9 % SODIUM CHLORIDE 0.9 %
1000 INTRAVENOUS SOLUTION INTRAVENOUS ONCE
Status: COMPLETED | OUTPATIENT
Start: 2025-07-12 | End: 2025-07-12

## 2025-07-12 RX ORDER — LOPERAMIDE HYDROCHLORIDE 2 MG/1
2 CAPSULE ORAL 4 TIMES DAILY PRN
Status: DISCONTINUED | OUTPATIENT
Start: 2025-07-12 | End: 2025-07-15 | Stop reason: HOSPADM

## 2025-07-12 RX ORDER — ONDANSETRON 2 MG/ML
4 INJECTION INTRAMUSCULAR; INTRAVENOUS ONCE
Status: COMPLETED | OUTPATIENT
Start: 2025-07-12 | End: 2025-07-12

## 2025-07-12 RX ORDER — METRONIDAZOLE 500 MG/100ML
500 INJECTION, SOLUTION INTRAVENOUS EVERY 8 HOURS
Status: DISCONTINUED | OUTPATIENT
Start: 2025-07-12 | End: 2025-07-15 | Stop reason: HOSPADM

## 2025-07-12 RX ORDER — FENTANYL CITRATE 50 UG/ML
50 INJECTION, SOLUTION INTRAMUSCULAR; INTRAVENOUS ONCE
Status: COMPLETED | OUTPATIENT
Start: 2025-07-12 | End: 2025-07-12

## 2025-07-12 RX ORDER — PROCHLORPERAZINE EDISYLATE 5 MG/ML
10 INJECTION INTRAMUSCULAR; INTRAVENOUS ONCE
Status: COMPLETED | OUTPATIENT
Start: 2025-07-12 | End: 2025-07-12

## 2025-07-12 RX ORDER — INDOMETHACIN 100 MG
100 SUPPOSITORY, RECTAL RECTAL SEE ADMIN INSTRUCTIONS
Status: DISCONTINUED | OUTPATIENT
Start: 2025-07-14 | End: 2025-07-15 | Stop reason: HOSPADM

## 2025-07-12 RX ORDER — SODIUM CHLORIDE 0.9 % (FLUSH) 0.9 %
5-40 SYRINGE (ML) INJECTION PRN
Status: DISCONTINUED | OUTPATIENT
Start: 2025-07-12 | End: 2025-07-15 | Stop reason: HOSPADM

## 2025-07-12 RX ORDER — POLYETHYLENE GLYCOL 3350 17 G/17G
17 POWDER, FOR SOLUTION ORAL DAILY PRN
Status: DISCONTINUED | OUTPATIENT
Start: 2025-07-12 | End: 2025-07-15 | Stop reason: HOSPADM

## 2025-07-12 RX ORDER — HEPARIN SODIUM 5000 [USP'U]/ML
5000 INJECTION, SOLUTION INTRAVENOUS; SUBCUTANEOUS 2 TIMES DAILY
Status: DISCONTINUED | OUTPATIENT
Start: 2025-07-12 | End: 2025-07-15 | Stop reason: HOSPADM

## 2025-07-12 RX ORDER — SODIUM CHLORIDE 9 MG/ML
INJECTION, SOLUTION INTRAVENOUS PRN
Status: DISCONTINUED | OUTPATIENT
Start: 2025-07-12 | End: 2025-07-15 | Stop reason: HOSPADM

## 2025-07-12 RX ORDER — ACETAMINOPHEN 325 MG/1
650 TABLET ORAL EVERY 6 HOURS PRN
Status: DISCONTINUED | OUTPATIENT
Start: 2025-07-12 | End: 2025-07-12

## 2025-07-12 RX ORDER — ONDANSETRON 2 MG/ML
4 INJECTION INTRAMUSCULAR; INTRAVENOUS EVERY 6 HOURS PRN
Status: DISCONTINUED | OUTPATIENT
Start: 2025-07-12 | End: 2025-07-15 | Stop reason: HOSPADM

## 2025-07-12 RX ORDER — ONDANSETRON 4 MG/1
4 TABLET, ORALLY DISINTEGRATING ORAL EVERY 8 HOURS PRN
Status: DISCONTINUED | OUTPATIENT
Start: 2025-07-12 | End: 2025-07-15 | Stop reason: HOSPADM

## 2025-07-12 RX ORDER — IOPAMIDOL 755 MG/ML
75 INJECTION, SOLUTION INTRAVASCULAR
Status: COMPLETED | OUTPATIENT
Start: 2025-07-12 | End: 2025-07-12

## 2025-07-12 RX ORDER — PANTOPRAZOLE SODIUM 20 MG/1
20 TABLET, DELAYED RELEASE ORAL
Status: DISCONTINUED | OUTPATIENT
Start: 2025-07-12 | End: 2025-07-15 | Stop reason: HOSPADM

## 2025-07-12 RX ORDER — SODIUM CHLORIDE 0.9 % (FLUSH) 0.9 %
5-40 SYRINGE (ML) INJECTION EVERY 12 HOURS SCHEDULED
Status: DISCONTINUED | OUTPATIENT
Start: 2025-07-12 | End: 2025-07-15 | Stop reason: HOSPADM

## 2025-07-12 RX ORDER — LORAZEPAM 1 MG/1
1 TABLET ORAL 2 TIMES DAILY
Status: DISCONTINUED | OUTPATIENT
Start: 2025-07-12 | End: 2025-07-15 | Stop reason: HOSPADM

## 2025-07-12 RX ORDER — CHLORHEXIDINE GLUCONATE ORAL RINSE 1.2 MG/ML
15 SOLUTION DENTAL 2 TIMES DAILY
Status: DISCONTINUED | OUTPATIENT
Start: 2025-07-12 | End: 2025-07-15 | Stop reason: HOSPADM

## 2025-07-12 RX ORDER — ACETAMINOPHEN 650 MG/1
650 SUPPOSITORY RECTAL EVERY 6 HOURS PRN
Status: DISCONTINUED | OUTPATIENT
Start: 2025-07-12 | End: 2025-07-12

## 2025-07-12 RX ADMIN — WATER 2000 MG: 1 INJECTION INTRAMUSCULAR; INTRAVENOUS; SUBCUTANEOUS at 15:30

## 2025-07-12 RX ADMIN — 0.12% CHLORHEXIDINE GLUCONATE 15 ML: 1.2 RINSE ORAL at 20:22

## 2025-07-12 RX ADMIN — LIDOCAINE HYDROCHLORIDE: 20 SOLUTION ORAL at 05:34

## 2025-07-12 RX ADMIN — IOPAMIDOL 75 ML: 755 INJECTION, SOLUTION INTRAVENOUS at 06:19

## 2025-07-12 RX ADMIN — METRONIDAZOLE 500 MG: 500 INJECTION, SOLUTION INTRAVENOUS at 10:52

## 2025-07-12 RX ADMIN — ONDANSETRON 4 MG: 2 INJECTION, SOLUTION INTRAMUSCULAR; INTRAVENOUS at 13:30

## 2025-07-12 RX ADMIN — HYDROMORPHONE HYDROCHLORIDE 0.25 MG: 1 INJECTION, SOLUTION INTRAMUSCULAR; INTRAVENOUS; SUBCUTANEOUS at 07:52

## 2025-07-12 RX ADMIN — HYDROMORPHONE HYDROCHLORIDE 0.25 MG: 1 INJECTION, SOLUTION INTRAMUSCULAR; INTRAVENOUS; SUBCUTANEOUS at 13:58

## 2025-07-12 RX ADMIN — SODIUM CHLORIDE: 0.9 INJECTION, SOLUTION INTRAVENOUS at 09:45

## 2025-07-12 RX ADMIN — PIPERACILLIN AND TAZOBACTAM 4500 MG: 4; .5 INJECTION, POWDER, LYOPHILIZED, FOR SOLUTION INTRAVENOUS at 07:59

## 2025-07-12 RX ADMIN — ONDANSETRON 4 MG: 2 INJECTION, SOLUTION INTRAMUSCULAR; INTRAVENOUS at 22:16

## 2025-07-12 RX ADMIN — ONDANSETRON 4 MG: 2 INJECTION, SOLUTION INTRAMUSCULAR; INTRAVENOUS at 05:33

## 2025-07-12 RX ADMIN — METRONIDAZOLE 500 MG: 500 INJECTION, SOLUTION INTRAVENOUS at 18:00

## 2025-07-12 RX ADMIN — 0.12% CHLORHEXIDINE GLUCONATE 15 ML: 1.2 RINSE ORAL at 10:51

## 2025-07-12 RX ADMIN — PROCHLORPERAZINE EDISYLATE 10 MG: 5 INJECTION INTRAMUSCULAR; INTRAVENOUS at 07:52

## 2025-07-12 RX ADMIN — SODIUM CHLORIDE, PRESERVATIVE FREE 10 ML: 5 INJECTION INTRAVENOUS at 09:47

## 2025-07-12 RX ADMIN — HEPARIN SODIUM 5000 UNITS: 5000 INJECTION INTRAVENOUS; SUBCUTANEOUS at 10:19

## 2025-07-12 RX ADMIN — HEPARIN SODIUM 5000 UNITS: 5000 INJECTION INTRAVENOUS; SUBCUTANEOUS at 20:22

## 2025-07-12 RX ADMIN — SODIUM CHLORIDE 1000 ML: 0.9 INJECTION, SOLUTION INTRAVENOUS at 05:36

## 2025-07-12 RX ADMIN — HYDROMORPHONE HYDROCHLORIDE 0.25 MG: 1 INJECTION, SOLUTION INTRAMUSCULAR; INTRAVENOUS; SUBCUTANEOUS at 09:55

## 2025-07-12 RX ADMIN — FENTANYL CITRATE 50 MCG: 0.05 INJECTION, SOLUTION INTRAMUSCULAR; INTRAVENOUS at 06:38

## 2025-07-12 RX ADMIN — FENTANYL CITRATE 50 MCG: 0.05 INJECTION, SOLUTION INTRAMUSCULAR; INTRAVENOUS at 05:33

## 2025-07-12 ASSESSMENT — PAIN - FUNCTIONAL ASSESSMENT
PAIN_FUNCTIONAL_ASSESSMENT: ACTIVITIES ARE NOT PREVENTED
PAIN_FUNCTIONAL_ASSESSMENT: 0-10

## 2025-07-12 ASSESSMENT — PAIN DESCRIPTION - ORIENTATION
ORIENTATION: MID

## 2025-07-12 ASSESSMENT — PAIN DESCRIPTION - LOCATION
LOCATION: ABDOMEN

## 2025-07-12 ASSESSMENT — PAIN DESCRIPTION - DESCRIPTORS
DESCRIPTORS: STABBING
DESCRIPTORS: ACHING

## 2025-07-12 ASSESSMENT — PAIN DESCRIPTION - PAIN TYPE: TYPE: ACUTE PAIN

## 2025-07-12 ASSESSMENT — PAIN SCALES - GENERAL
PAINLEVEL_OUTOF10: 8
PAINLEVEL_OUTOF10: 10

## 2025-07-12 ASSESSMENT — PAIN DESCRIPTION - FREQUENCY
FREQUENCY: CONTINUOUS
FREQUENCY: CONTINUOUS

## 2025-07-12 ASSESSMENT — PAIN DESCRIPTION - ONSET: ONSET: ON-GOING

## 2025-07-12 NOTE — CONSULTS
Gastroenterology, Hepatology, &  Advanced Endoscopy    Consult Note      Reason for Consult: Choledocholithiasis     HPI:   Kay Barrera is a 89 y.o. female w/ PMH of  has a past medical history of Abdominal pain, Anxiety, PONV (postoperative nausea and vomiting), Severe sepsis (HCC), and Sinus tachycardia. who presents to the ED with n/v and abdominal pain. She also had associated diarrhea. She is without associated fevers/chills. She has a history of previous ERCP with stent placement and removal as well as CCY.     I personally reviewed the CT images. She does have pneumobilia consistent with previous stent placement though also has significant intrahepatic and extrahepatic biliary dilation suggestive of obstruction.       Recent Labs     07/12/25  0519 07/12/25  0848   * 221*   * 362*   ALKPHOS 477* 401*   BILITOT 1.8* 2.1*   BILIDIR  --  0.8*     Lab Results   Component Value Date    WBC 10.3 07/12/2025    HGB 13.9 07/12/2025    HCT 40.0 07/12/2025     07/12/2025     07/12/2025    K 3.7 07/12/2025     07/12/2025    CREATININE 0.9 07/12/2025    BUN 14 07/12/2025    CO2 24 07/12/2025    GLUCOSE 141 (H) 07/12/2025    INR 1.1 11/19/2018    PROTIME 12.7 (H) 11/19/2018     Lab Results   Component Value Date    INR 1.1 11/19/2018    INR 1.3 11/15/2018    INR 1.0 10/14/2015    PROTIME 12.7 (H) 11/19/2018    PROTIME 14.2 (H) 11/15/2018    PROTIME 12.0 10/14/2015      Lipase   Date Value Ref Range Status   07/12/2025 60 13 - 60 U/L Final   04/09/2023 42 13 - 60 U/L Final   04/07/2023 85 (H) 13 - 60 U/L Final         CT Result (most recent):  CT ABDOMEN PELVIS W IV CONTRAST 07/12/2025    Narrative  EXAMINATION:  CT OF THE ABDOMEN AND PELVIS WITH CONTRAST 7/12/2025 6:19 am    TECHNIQUE:  CT of the abdomen and pelvis was performed with the administration of  intravenous contrast. Multiplanar reformatted images are provided for review.  Automated exposure control, iterative

## 2025-07-12 NOTE — ED PROVIDER NOTES
[facility] MED SURG  EMERGENCY DEPARTMENT ENCOUNTER        Pt Name: Kay Barrera  MRN: [id number]  Birthdate 8/9/1935  Date of evaluation: 7/12/2025  Provider: Rai Proctor DO  PCP: No primary care provider on file.  Note Started: 5:10 AM EDT 7/12/25    CHIEF COMPLAINT       Chief Complaint   Patient presents with    Abdominal Pain    Nausea    Vomiting    Diarrhea     Off and on for 10 days       HISTORY OF PRESENT ILLNESS: 1 or more Elements   History From: PATIENT     Limitations to history : None    Kay Barrera is a 89 y.o. female with prior history of pancreatitis, cholecystectomy arriving with a complaint of upper abdominal pain with associated nausea vomiting and diarrhea.  She reports that it started 10 days ago and then stopped but returned again this morning.  She denies any blood in her vomit or her stool.  She has no associated fever.  She describes the pain as a sharp pain.      Nursing Notes were all reviewed and agreed with or any disagreements were addressed in the HPI.    REVIEW OF SYSTEMS :      Review of Systems    POSITIVE (+): Nausea, vomiting, diarrhea, abdominal pain  NEGATIVE (-): Fever, chills, constipation, chest pain, shortness of breath    SURGICAL HISTORY     Past Surgical History:   Procedure Laterality Date    CHOLECYSTECTOMY      COLONOSCOPY      ENDOSCOPY, COLON, DIAGNOSTIC      ERCP  6-24-15    ballon extraction with stent insertion    ERCP  4/29/2019    ERCP STENT REMOVAL performed by Santa Butler MD at [facility] OR    ERCP  4/29/2019    ERCP STONE REMOVAL performed by Santa Butler MD at [facility] OR    EYE SURGERY Bilateral     cataracts    [facility] ERCP BILIARY/PANC DUCT STENT EXCHANGE W/DIL&WIRE N/A 11/15/2018    ERCP ENDOSCOPIC RETROGRADE CHOLANGIOPANCREATOGRAPHY 7a7 stent 15-18 balloon sweep stone removal performed by Santa Butler MD at [facility] OR       CURRENTMEDICATIONS       Current Discharge Medication List        CONTINUE these medications which have NOT

## 2025-07-12 NOTE — ED NOTES
ED to Inpatient Handoff Report    Notified floor RN that electronic handoff available and patient ready for transport to room 523.    Safety Risks: Risk of falls    Patient in Restraints: no    Constant Observer or Patient : no    Telemetry Monitoring Ordered: No            Deterioration Index: 35.35    Vitals:    07/12/25 0806 07/12/25 0830 07/12/25 0838 07/12/25 0900   BP:   (!) 143/59    Pulse: (!) 113 (!) 103  100   Resp: 26 26 24    Temp:    98.6 °F (37 °C)   TempSrc:       SpO2:       Weight:       Height:           Opportunity for questions and clarification was provided.

## 2025-07-12 NOTE — ED PROVIDER NOTES
Emergency Department Encounter  [facility] EMERGENCY DEPARTMENT    Patient: Kay  MRN: [id number]  : 1935  Date of Evaluation: 2025  ED Supervising Physician: Aldair Giron MD    I personally evaluated Kay Barrera and made/approved the management plan and take responsibility for the patient management.    This will serve as my Supervisory note and shared attestation.  I did perform a substantive portion of the visit including all aspects of the Medical Decision Making.    I wore appropriate PPE for the entirety of this encounter.    In brief, Kay is a 89 y.o. that presents to the emergency department 10 days of N/V/D.  Also has epigastric tenderness no fevers or urinary symptoms history of cholecystectomy    Focused exam: Vitals are stable she is awake in no acute distress does have mild reproducible epigastric tenderness with palpation.  No other acute distress.  Calm cooperative.    Brief ED course/MDM: Patient presented with the jewelry diarrhea but given age focal tenderness will complete CT scan rule out dangerous intra-abdominal pathology such as perforated viscus and pancreatitis.  Workup is benign she is tolerating p.o. may be appropriate for discharge dispo pending    Diagnostics interpreted by me:      I personally discussed the patient's management with other clinicians:      All diagnostic, treatment, and disposition decisions were made by myself in conjunction with the Resident. I also supervised key portions of any procedures performed by the Resident. For all further details of the patient's emergency department visit, please see their documentation.    (Comment: Please note this report has been produced using speech recognition software and may contain errors related to that system including errors in grammar, punctuation, and spelling, as well as words and phrases that may be inappropriate.  If there are any questions or concerns please feel

## 2025-07-12 NOTE — PROGRESS NOTES
4 Eyes Skin Assessment     NAME:  Kay Barrera  YOB: 1935  MEDICAL RECORD NUMBER:  [id number]    The patient is being assessed for  Admission    I agree that at least one RN has performed a thorough Head to Toe Skin Assessment on the patient. ALL assessment sites listed below have been assessed.      Areas assessed by both nurses:    Head, Face, Ears, Shoulders, Back, Chest, Arms, Elbows, Hands, Sacrum. Buttock, Coccyx, Ischium, and Legs. Feet and Heels        Does the Patient have a Wound? Yes wound(s) were present on assessment. LDA wound assessment was Initiated and completed by RN       Ruy Prevention initiated by RN: No  Wound Care Orders initiated by RN: No    Pressure Injury (Stage 1,2,3,4, Unstageable, DTI, NWPT, and Complex wounds) if present, place Wound referral order by RN under : No    New Ostomies, if present place, Ostomy referral order under : No     Nurse 1 eSignature: Electronically signed by Luz Maria Walton RN on 7/12/25 at 10:34 AM EDT    **SHARE this note so that the co-signing nurse can place an eSignature**    Nurse 2 eSignature: Electronically signed by Saundra Quintanilla RN on 7/12/25 at 4:11 PM EDT

## 2025-07-12 NOTE — PROGRESS NOTES
Consult received and chart reviewed.    History of biliary pancreatitis s/p CCY and ERCP with stone removal and stent placement in 2018 with stent and stone removal in 2019 who presents with abdominal pain and n/v and is found to have elevated LFTs and imaging showing biliary obstruction.     PLAN:  - Antibiotics while obstructed.  - Supportive care.  - ERCP Monday.     Full consult to follow.

## 2025-07-12 NOTE — H&P
[facility] Hospitalist Group History and Physical      CHIEF COMPLAINT:  nausea, vomiting, abdominal pain     History of Present Illness:      This is an 89 y.o. female with prior history of pancreatitis, cholecystectomy arriving with a complaint of upper abdominal pain with associated nausea vomiting and diarrhea for the last 10 days.  She is hypertensive and tachycardic on arrival to Er. Lab workup: LA 2.6, glucose 141, Calcium 10.3, , . CT abdomen pelvis with extensive pneumobilia with increased intrahepatic and extrahepatic biliary dilatation with common bile duct dilated mixed signals of pneumobilia densities calcification 16 mm. She was given GI cocktail, Fentanyl x 2, Dilaudid, Zofran, Zosyn, Compazine, 1 L bolus. Decision to admit with GI consult.     Informant(s) for H&P: patient, epic     REVIEW OF SYSTEMS:  A comprehensive review of systems was negative except for: what is in the HPI    PMH:  Past Medical History:   Diagnosis Date    Abdominal pain     Anxiety     PONV (postoperative nausea and vomiting)     Severe sepsis (HCC) 11/15/2018    Sinus tachycardia 11/15/2018     Surgical History:  Past Surgical History:   Procedure Laterality Date    CHOLECYSTECTOMY      COLONOSCOPY      ENDOSCOPY, COLON, DIAGNOSTIC      ERCP  6-24-15    ballon extraction with stent insertion    ERCP  4/29/2019    ERCP STENT REMOVAL performed by Santa Butler MD at [facility] OR    ERCP  4/29/2019    ERCP STONE REMOVAL performed by Santa Butler MD at [facility] OR    EYE SURGERY Bilateral     cataracts    IN ERCP BILIARY/PANC DUCT STENT EXCHANGE W/DIL&WIRE N/A 11/15/2018    ERCP ENDOSCOPIC RETROGRADE CHOLANGIOPANCREATOGRAPHY 7a7 stent 15-18 balloon sweep stone removal performed by Santa Butler MD at [facility] OR     Medications Prior to Admission:    Prior to Admission medications    Medication Sig Start Date End Date Taking? Authorizing Provider   pantoprazole (PROTONIX) 20 MG tablet Take 1 tablet by mouth daily

## 2025-07-13 LAB
ALBUMIN SERPL-MCNC: 3.1 G/DL (ref 3.5–5.2)
ALP SERPL-CCNC: 384 U/L (ref 35–104)
ALT SERPL-CCNC: 206 U/L (ref 0–35)
ANION GAP SERPL CALCULATED.3IONS-SCNC: 13 MMOL/L (ref 7–16)
AST SERPL-CCNC: 196 U/L (ref 0–35)
BASOPHILS # BLD: 0.04 K/UL (ref 0–0.2)
BASOPHILS NFR BLD: 0 % (ref 0–2)
BILIRUB SERPL-MCNC: 2.4 MG/DL (ref 0–1.2)
BUN SERPL-MCNC: 12 MG/DL (ref 8–23)
CALCIUM SERPL-MCNC: 8.4 MG/DL (ref 8.8–10.2)
CHLORIDE SERPL-SCNC: 104 MMOL/L (ref 98–107)
CO2 SERPL-SCNC: 21 MMOL/L (ref 22–29)
CREAT SERPL-MCNC: 0.8 MG/DL (ref 0.5–1)
CRP SERPL HS-MCNC: 97.1 MG/L (ref 0–5)
EOSINOPHIL # BLD: 0.03 K/UL (ref 0.05–0.5)
EOSINOPHILS RELATIVE PERCENT: 0 % (ref 0–6)
ERYTHROCYTE [DISTWIDTH] IN BLOOD BY AUTOMATED COUNT: 12.5 % (ref 11.5–15)
ERYTHROCYTE [SEDIMENTATION RATE] IN BLOOD BY WESTERGREN METHOD: 58 MM/HR (ref 0–20)
GFR, ESTIMATED: 66 ML/MIN/1.73M2
GLUCOSE SERPL-MCNC: 84 MG/DL (ref 74–99)
HCT VFR BLD AUTO: 36.5 % (ref 34–48)
HGB BLD-MCNC: 11.8 G/DL (ref 11.5–15.5)
IMM GRANULOCYTES # BLD AUTO: 0.14 K/UL (ref 0–0.58)
IMM GRANULOCYTES NFR BLD: 1 % (ref 0–5)
LYMPHOCYTES NFR BLD: 1.02 K/UL (ref 1.5–4)
LYMPHOCYTES RELATIVE PERCENT: 8 % (ref 20–42)
MCH RBC QN AUTO: 28.6 PG (ref 26–35)
MCHC RBC AUTO-ENTMCNC: 32.3 G/DL (ref 32–34.5)
MCV RBC AUTO: 88.6 FL (ref 80–99.9)
MONOCYTES NFR BLD: 0.73 K/UL (ref 0.1–0.95)
MONOCYTES NFR BLD: 6 % (ref 2–12)
NEUTROPHILS NFR BLD: 85 % (ref 43–80)
NEUTS SEG NFR BLD: 11.22 K/UL (ref 1.8–7.3)
PHOSPHATE SERPL-MCNC: 2.6 MG/DL (ref 2.5–4.5)
PLATELET # BLD AUTO: 226 K/UL (ref 130–450)
PMV BLD AUTO: 9.8 FL (ref 7–12)
POTASSIUM SERPL-SCNC: 3.6 MMOL/L (ref 3.5–5.1)
PROCALCITONIN SERPL-MCNC: 1.41 NG/ML (ref 0–0.08)
PROT SERPL-MCNC: 6 G/DL (ref 6.4–8.3)
RBC # BLD AUTO: 4.12 M/UL (ref 3.5–5.5)
SODIUM SERPL-SCNC: 138 MMOL/L (ref 136–145)
WBC OTHER # BLD: 13.2 K/UL (ref 4.5–11.5)

## 2025-07-13 PROCEDURE — 86140 C-REACTIVE PROTEIN: CPT

## 2025-07-13 PROCEDURE — 84100 ASSAY OF PHOSPHORUS: CPT

## 2025-07-13 PROCEDURE — 84145 PROCALCITONIN (PCT): CPT

## 2025-07-13 PROCEDURE — 85025 COMPLETE CBC W/AUTO DIFF WBC: CPT

## 2025-07-13 PROCEDURE — 2500000003 HC RX 250 WO HCPCS: Performed by: INTERNAL MEDICINE

## 2025-07-13 PROCEDURE — 2500000003 HC RX 250 WO HCPCS: Performed by: STUDENT IN AN ORGANIZED HEALTH CARE EDUCATION/TRAINING PROGRAM

## 2025-07-13 PROCEDURE — 87329 GIARDIA AG IA: CPT

## 2025-07-13 PROCEDURE — 1200000000 HC SEMI PRIVATE

## 2025-07-13 PROCEDURE — 99232 SBSQ HOSP IP/OBS MODERATE 35: CPT

## 2025-07-13 PROCEDURE — 6370000000 HC RX 637 (ALT 250 FOR IP)

## 2025-07-13 PROCEDURE — 6370000000 HC RX 637 (ALT 250 FOR IP): Performed by: INTERNAL MEDICINE

## 2025-07-13 PROCEDURE — 80053 COMPREHEN METABOLIC PANEL: CPT

## 2025-07-13 PROCEDURE — 85652 RBC SED RATE AUTOMATED: CPT

## 2025-07-13 PROCEDURE — 36415 COLL VENOUS BLD VENIPUNCTURE: CPT

## 2025-07-13 PROCEDURE — 6360000002 HC RX W HCPCS: Performed by: INTERNAL MEDICINE

## 2025-07-13 PROCEDURE — 6360000002 HC RX W HCPCS: Performed by: STUDENT IN AN ORGANIZED HEALTH CARE EDUCATION/TRAINING PROGRAM

## 2025-07-13 RX ADMIN — HEPARIN SODIUM 5000 UNITS: 5000 INJECTION INTRAVENOUS; SUBCUTANEOUS at 21:55

## 2025-07-13 RX ADMIN — LORAZEPAM 1 MG: 1 TABLET ORAL at 21:55

## 2025-07-13 RX ADMIN — LORAZEPAM 1 MG: 1 TABLET ORAL at 08:23

## 2025-07-13 RX ADMIN — PANTOPRAZOLE SODIUM 20 MG: 20 TABLET, DELAYED RELEASE ORAL at 06:16

## 2025-07-13 RX ADMIN — WATER 2000 MG: 1 INJECTION INTRAMUSCULAR; INTRAVENOUS; SUBCUTANEOUS at 15:30

## 2025-07-13 RX ADMIN — METRONIDAZOLE 500 MG: 500 INJECTION, SOLUTION INTRAVENOUS at 10:10

## 2025-07-13 RX ADMIN — LORAZEPAM 1 MG: 1 TABLET ORAL at 01:06

## 2025-07-13 RX ADMIN — METRONIDAZOLE 500 MG: 500 INJECTION, SOLUTION INTRAVENOUS at 18:12

## 2025-07-13 RX ADMIN — ONDANSETRON 4 MG: 4 TABLET, ORALLY DISINTEGRATING ORAL at 06:16

## 2025-07-13 RX ADMIN — SODIUM CHLORIDE, PRESERVATIVE FREE 10 ML: 5 INJECTION INTRAVENOUS at 08:15

## 2025-07-13 RX ADMIN — 0.12% CHLORHEXIDINE GLUCONATE 15 ML: 1.2 RINSE ORAL at 08:14

## 2025-07-13 RX ADMIN — SODIUM CHLORIDE, PRESERVATIVE FREE 10 ML: 5 INJECTION INTRAVENOUS at 21:56

## 2025-07-13 RX ADMIN — HEPARIN SODIUM 5000 UNITS: 5000 INJECTION INTRAVENOUS; SUBCUTANEOUS at 08:14

## 2025-07-13 RX ADMIN — METRONIDAZOLE 500 MG: 500 INJECTION, SOLUTION INTRAVENOUS at 02:27

## 2025-07-13 NOTE — PROGRESS NOTES
[facility] Hospitalist Progress Note    Admitting Date and Time: 7/12/2025  4:59 AM  Admit Dx: Pneumobilia [K83.8]  Nausea and vomiting, unspecified vomiting type [R11.2]    Subjective:  Patient is being followed for Pneumobilia [K83.8]  Nausea and vomiting, unspecified vomiting type [R11.2]   Pt was seen and examined today. Denies any new issues    ROS: denies fever, chills, cp, sob, n/v, HA unless stated above.     LORazepam  1 mg Oral BID    pantoprazole  20 mg Oral QAM AC    sodium chloride flush  5-40 mL IntraVENous 2 times per day    heparin (porcine)  5,000 Units SubCUTAneous BID    cefTRIAXone (ROCEPHIN) IV  2,000 mg IntraVENous Q24H    metroNIDAZOLE  500 mg IntraVENous Q8H    chlorhexidine  15 mL Mouth/Throat BID    [START ON 7/14/2025] indomethacin  100 mg Rectal See Admin Instructions     sodium chloride flush, 5-40 mL, PRN  sodium chloride, , PRN  ondansetron, 4 mg, Q8H PRN   Or  ondansetron, 4 mg, Q6H PRN  polyethylene glycol, 17 g, Daily PRN  HYDROmorphone, 0.25 mg, Q4H PRN  loperamide, 2 mg, 4x Daily PRN         Objective:    /85   Pulse 90   Temp 98.4 °F (36.9 °C) (Oral)   Resp 16   Ht 1.499 m (4' 11\")   Wt 47.3 kg (104 lb 4.4 oz)   SpO2 (!) 87%   BMI 21.06 kg/m²   General Appearance: alert and oriented to person, place and time and in no acute distress  Skin: warm and dry  Head: normocephalic and atraumatic  Pulmonary/Chest: clear to auscultation bilaterally- no wheezes, rales or rhonchi, normal air movement, no respiratory distress  Cardiovascular: normal rate, normal S1 and S2 and no carotid bruits  Abdomen: soft, non-tender, non-distended, normal bowel sounds, no masses or organomegaly  Extremities: no cyanosis, no clubbing and no edema  Neurologic: no cranial nerve deficit and speech normal      Recent Labs     07/12/25  0519 07/13/25  0621    138   K 3.7 3.6    104   CO2 24 21*   BUN 14 12   CREATININE 0.9 0.8   GLUCOSE 141* 84   CALCIUM 10.3* 8.4*       Recent

## 2025-07-14 ENCOUNTER — APPOINTMENT (OUTPATIENT)
Dept: GENERAL RADIOLOGY | Age: 89
DRG: 445 | End: 2025-07-14
Payer: MEDICARE

## 2025-07-14 ENCOUNTER — ANESTHESIA EVENT (OUTPATIENT)
Dept: OPERATING ROOM | Age: 89
DRG: 445 | End: 2025-07-14
Payer: MEDICARE

## 2025-07-14 ENCOUNTER — ANESTHESIA (OUTPATIENT)
Dept: OPERATING ROOM | Age: 89
DRG: 445 | End: 2025-07-14
Payer: MEDICARE

## 2025-07-14 LAB
ALBUMIN SERPL-MCNC: 3.1 G/DL (ref 3.5–5.2)
ALP SERPL-CCNC: 330 U/L (ref 35–104)
ALT SERPL-CCNC: 144 U/L (ref 0–35)
ANION GAP SERPL CALCULATED.3IONS-SCNC: 11 MMOL/L (ref 7–16)
AST SERPL-CCNC: 101 U/L (ref 0–35)
BASOPHILS # BLD: 0.04 K/UL (ref 0–0.2)
BASOPHILS NFR BLD: 0 % (ref 0–2)
BILIRUB SERPL-MCNC: 0.7 MG/DL (ref 0–1.2)
BUN SERPL-MCNC: 13 MG/DL (ref 8–23)
C DIFF GDH + TOXINS A+B STL QL IA.RAPID: NEGATIVE
CALCIUM SERPL-MCNC: 8.6 MG/DL (ref 8.8–10.2)
CHLORIDE SERPL-SCNC: 104 MMOL/L (ref 98–107)
CO2 SERPL-SCNC: 23 MMOL/L (ref 22–29)
CREAT SERPL-MCNC: 1 MG/DL (ref 0.5–1)
EOSINOPHIL # BLD: 0.17 K/UL (ref 0.05–0.5)
EOSINOPHILS RELATIVE PERCENT: 2 % (ref 0–6)
ERYTHROCYTE [DISTWIDTH] IN BLOOD BY AUTOMATED COUNT: 12.7 % (ref 11.5–15)
G LAMBLIA AG STL QL IA: NEGATIVE
GFR, ESTIMATED: 57 ML/MIN/1.73M2
GLUCOSE SERPL-MCNC: 88 MG/DL (ref 74–99)
HCT VFR BLD AUTO: 33.2 % (ref 34–48)
HGB BLD-MCNC: 11.3 G/DL (ref 11.5–15.5)
IMM GRANULOCYTES # BLD AUTO: 0.09 K/UL (ref 0–0.58)
IMM GRANULOCYTES NFR BLD: 1 % (ref 0–5)
LYMPHOCYTES NFR BLD: 1.71 K/UL (ref 1.5–4)
LYMPHOCYTES RELATIVE PERCENT: 15 % (ref 20–42)
MCH RBC QN AUTO: 29 PG (ref 26–35)
MCHC RBC AUTO-ENTMCNC: 34 G/DL (ref 32–34.5)
MCV RBC AUTO: 85.3 FL (ref 80–99.9)
MONOCYTES NFR BLD: 0.75 K/UL (ref 0.1–0.95)
MONOCYTES NFR BLD: 7 % (ref 2–12)
NEUTROPHILS NFR BLD: 75 % (ref 43–80)
NEUTS SEG NFR BLD: 8.38 K/UL (ref 1.8–7.3)
PHOSPHATE SERPL-MCNC: 2.1 MG/DL (ref 2.5–4.5)
PLATELET # BLD AUTO: 251 K/UL (ref 130–450)
PMV BLD AUTO: 9.9 FL (ref 7–12)
POTASSIUM SERPL-SCNC: 3.6 MMOL/L (ref 3.5–5.1)
PROT SERPL-MCNC: 6 G/DL (ref 6.4–8.3)
RBC # BLD AUTO: 3.89 M/UL (ref 3.5–5.5)
SODIUM SERPL-SCNC: 138 MMOL/L (ref 136–145)
SOURCE: NORMAL
SPECIMEN DESCRIPTION: NORMAL
SPECIMEN DESCRIPTION: NORMAL
WBC OTHER # BLD: 11.1 K/UL (ref 4.5–11.5)

## 2025-07-14 PROCEDURE — 6360000002 HC RX W HCPCS: Performed by: INTERNAL MEDICINE

## 2025-07-14 PROCEDURE — 43264 ERCP REMOVE DUCT CALCULI: CPT | Performed by: STUDENT IN AN ORGANIZED HEALTH CARE EDUCATION/TRAINING PROGRAM

## 2025-07-14 PROCEDURE — 3600007513: Performed by: STUDENT IN AN ORGANIZED HEALTH CARE EDUCATION/TRAINING PROGRAM

## 2025-07-14 PROCEDURE — 3700000000 HC ANESTHESIA ATTENDED CARE: Performed by: STUDENT IN AN ORGANIZED HEALTH CARE EDUCATION/TRAINING PROGRAM

## 2025-07-14 PROCEDURE — 1200000000 HC SEMI PRIVATE

## 2025-07-14 PROCEDURE — BF101ZZ FLUOROSCOPY OF BILE DUCTS USING LOW OSMOLAR CONTRAST: ICD-10-PCS

## 2025-07-14 PROCEDURE — 85025 COMPLETE CBC W/AUTO DIFF WBC: CPT

## 2025-07-14 PROCEDURE — 6360000002 HC RX W HCPCS

## 2025-07-14 PROCEDURE — 0FC98ZZ EXTIRPATION OF MATTER FROM COMMON BILE DUCT, VIA NATURAL OR ARTIFICIAL OPENING ENDOSCOPIC: ICD-10-PCS | Performed by: STUDENT IN AN ORGANIZED HEALTH CARE EDUCATION/TRAINING PROGRAM

## 2025-07-14 PROCEDURE — 2500000003 HC RX 250 WO HCPCS: Performed by: STUDENT IN AN ORGANIZED HEALTH CARE EDUCATION/TRAINING PROGRAM

## 2025-07-14 PROCEDURE — 2580000003 HC RX 258

## 2025-07-14 PROCEDURE — 43274 ERCP DUCT STENT PLACEMENT: CPT | Performed by: STUDENT IN AN ORGANIZED HEALTH CARE EDUCATION/TRAINING PROGRAM

## 2025-07-14 PROCEDURE — 6370000000 HC RX 637 (ALT 250 FOR IP): Performed by: STUDENT IN AN ORGANIZED HEALTH CARE EDUCATION/TRAINING PROGRAM

## 2025-07-14 PROCEDURE — 6360000002 HC RX W HCPCS: Performed by: STUDENT IN AN ORGANIZED HEALTH CARE EDUCATION/TRAINING PROGRAM

## 2025-07-14 PROCEDURE — 2709999900 HC NON-CHARGEABLE SUPPLY: Performed by: STUDENT IN AN ORGANIZED HEALTH CARE EDUCATION/TRAINING PROGRAM

## 2025-07-14 PROCEDURE — 6370000000 HC RX 637 (ALT 250 FOR IP): Performed by: INTERNAL MEDICINE

## 2025-07-14 PROCEDURE — 3600007503: Performed by: STUDENT IN AN ORGANIZED HEALTH CARE EDUCATION/TRAINING PROGRAM

## 2025-07-14 PROCEDURE — C2617 STENT, NON-COR, TEM W/O DEL: HCPCS | Performed by: STUDENT IN AN ORGANIZED HEALTH CARE EDUCATION/TRAINING PROGRAM

## 2025-07-14 PROCEDURE — 3700000001 HC ADD 15 MINUTES (ANESTHESIA): Performed by: STUDENT IN AN ORGANIZED HEALTH CARE EDUCATION/TRAINING PROGRAM

## 2025-07-14 PROCEDURE — 2720000010 HC SURG SUPPLY STERILE: Performed by: STUDENT IN AN ORGANIZED HEALTH CARE EDUCATION/TRAINING PROGRAM

## 2025-07-14 PROCEDURE — 7100000011 HC PHASE II RECOVERY - ADDTL 15 MIN: Performed by: STUDENT IN AN ORGANIZED HEALTH CARE EDUCATION/TRAINING PROGRAM

## 2025-07-14 PROCEDURE — 2500000003 HC RX 250 WO HCPCS

## 2025-07-14 PROCEDURE — 74328 X-RAY BILE DUCT ENDOSCOPY: CPT | Performed by: STUDENT IN AN ORGANIZED HEALTH CARE EDUCATION/TRAINING PROGRAM

## 2025-07-14 PROCEDURE — 6370000000 HC RX 637 (ALT 250 FOR IP)

## 2025-07-14 PROCEDURE — 84100 ASSAY OF PHOSPHORUS: CPT

## 2025-07-14 PROCEDURE — 80053 COMPREHEN METABOLIC PANEL: CPT

## 2025-07-14 PROCEDURE — C1769 GUIDE WIRE: HCPCS | Performed by: STUDENT IN AN ORGANIZED HEALTH CARE EDUCATION/TRAINING PROGRAM

## 2025-07-14 PROCEDURE — 0F798DZ DILATION OF COMMON BILE DUCT WITH INTRALUMINAL DEVICE, VIA NATURAL OR ARTIFICIAL OPENING ENDOSCOPIC: ICD-10-PCS | Performed by: STUDENT IN AN ORGANIZED HEALTH CARE EDUCATION/TRAINING PROGRAM

## 2025-07-14 PROCEDURE — 99232 SBSQ HOSP IP/OBS MODERATE 35: CPT

## 2025-07-14 PROCEDURE — 7100000010 HC PHASE II RECOVERY - FIRST 15 MIN: Performed by: STUDENT IN AN ORGANIZED HEALTH CARE EDUCATION/TRAINING PROGRAM

## 2025-07-14 PROCEDURE — 2500000003 HC RX 250 WO HCPCS: Performed by: INTERNAL MEDICINE

## 2025-07-14 DEVICE — BILIARY STENT
Type: IMPLANTABLE DEVICE | Status: FUNCTIONAL
Brand: ADVANIX™ BILIARY

## 2025-07-14 RX ORDER — PROPOFOL 10 MG/ML
INJECTION, EMULSION INTRAVENOUS
Status: DISCONTINUED | OUTPATIENT
Start: 2025-07-14 | End: 2025-07-14 | Stop reason: SDUPTHER

## 2025-07-14 RX ORDER — SODIUM CHLORIDE 0.9 % (FLUSH) 0.9 %
5-40 SYRINGE (ML) INJECTION EVERY 12 HOURS SCHEDULED
Status: DISCONTINUED | OUTPATIENT
Start: 2025-07-14 | End: 2025-07-15 | Stop reason: HOSPADM

## 2025-07-14 RX ORDER — SODIUM CHLORIDE 9 MG/ML
INJECTION, SOLUTION INTRAVENOUS
Status: DISCONTINUED | OUTPATIENT
Start: 2025-07-14 | End: 2025-07-14 | Stop reason: SDUPTHER

## 2025-07-14 RX ORDER — SODIUM CHLORIDE 9 MG/ML
25 INJECTION, SOLUTION INTRAVENOUS PRN
Status: DISCONTINUED | OUTPATIENT
Start: 2025-07-14 | End: 2025-07-15 | Stop reason: HOSPADM

## 2025-07-14 RX ORDER — SODIUM CHLORIDE 9 MG/ML
INJECTION, SOLUTION INTRAVENOUS PRN
Status: DISCONTINUED | OUTPATIENT
Start: 2025-07-14 | End: 2025-07-15 | Stop reason: HOSPADM

## 2025-07-14 RX ORDER — SODIUM CHLORIDE 0.9 % (FLUSH) 0.9 %
5-40 SYRINGE (ML) INJECTION PRN
Status: DISCONTINUED | OUTPATIENT
Start: 2025-07-14 | End: 2025-07-15 | Stop reason: HOSPADM

## 2025-07-14 RX ORDER — ONDANSETRON 2 MG/ML
INJECTION INTRAMUSCULAR; INTRAVENOUS
Status: DISCONTINUED | OUTPATIENT
Start: 2025-07-14 | End: 2025-07-14 | Stop reason: SDUPTHER

## 2025-07-14 RX ORDER — LIDOCAINE HYDROCHLORIDE 20 MG/ML
INJECTION, SOLUTION EPIDURAL; INFILTRATION; INTRACAUDAL; PERINEURAL
Status: DISCONTINUED | OUTPATIENT
Start: 2025-07-14 | End: 2025-07-14 | Stop reason: SDUPTHER

## 2025-07-14 RX ORDER — OXYCODONE HYDROCHLORIDE 5 MG/1
5 TABLET ORAL
Status: DISCONTINUED | OUTPATIENT
Start: 2025-07-14 | End: 2025-07-15 | Stop reason: HOSPADM

## 2025-07-14 RX ORDER — ESMOLOL HYDROCHLORIDE 10 MG/ML
INJECTION INTRAVENOUS
Status: DISCONTINUED | OUTPATIENT
Start: 2025-07-14 | End: 2025-07-14 | Stop reason: SDUPTHER

## 2025-07-14 RX ORDER — PROCHLORPERAZINE EDISYLATE 5 MG/ML
5 INJECTION INTRAMUSCULAR; INTRAVENOUS
Status: DISCONTINUED | OUTPATIENT
Start: 2025-07-14 | End: 2025-07-15 | Stop reason: HOSPADM

## 2025-07-14 RX ORDER — ROCURONIUM BROMIDE 10 MG/ML
INJECTION, SOLUTION INTRAVENOUS
Status: DISCONTINUED | OUTPATIENT
Start: 2025-07-14 | End: 2025-07-14 | Stop reason: SDUPTHER

## 2025-07-14 RX ORDER — FENTANYL CITRATE 50 UG/ML
INJECTION, SOLUTION INTRAMUSCULAR; INTRAVENOUS
Status: DISCONTINUED | OUTPATIENT
Start: 2025-07-14 | End: 2025-07-14 | Stop reason: SDUPTHER

## 2025-07-14 RX ORDER — DEXAMETHASONE SODIUM PHOSPHATE 4 MG/ML
INJECTION, SOLUTION INTRA-ARTICULAR; INTRALESIONAL; INTRAMUSCULAR; INTRAVENOUS; SOFT TISSUE
Status: DISCONTINUED | OUTPATIENT
Start: 2025-07-14 | End: 2025-07-14 | Stop reason: SDUPTHER

## 2025-07-14 RX ADMIN — LIDOCAINE HYDROCHLORIDE 100 MG: 20 INJECTION, SOLUTION EPIDURAL; INFILTRATION; INTRACAUDAL; PERINEURAL at 14:52

## 2025-07-14 RX ADMIN — SUGAMMADEX 200 MG: 100 INJECTION, SOLUTION INTRAVENOUS at 15:32

## 2025-07-14 RX ADMIN — SODIUM CHLORIDE, PRESERVATIVE FREE 10 ML: 5 INJECTION INTRAVENOUS at 08:39

## 2025-07-14 RX ADMIN — FENTANYL CITRATE 25 MCG: 50 INJECTION, SOLUTION INTRAMUSCULAR; INTRAVENOUS at 14:52

## 2025-07-14 RX ADMIN — ROCURONIUM BROMIDE 30 MG: 10 INJECTION, SOLUTION INTRAVENOUS at 14:52

## 2025-07-14 RX ADMIN — LORAZEPAM 1 MG: 1 TABLET ORAL at 20:47

## 2025-07-14 RX ADMIN — HEPARIN SODIUM 5000 UNITS: 5000 INJECTION INTRAVENOUS; SUBCUTANEOUS at 20:47

## 2025-07-14 RX ADMIN — METRONIDAZOLE 500 MG: 500 INJECTION, SOLUTION INTRAVENOUS at 02:50

## 2025-07-14 RX ADMIN — ONDANSETRON 4 MG: 2 INJECTION, SOLUTION INTRAMUSCULAR; INTRAVENOUS at 16:35

## 2025-07-14 RX ADMIN — Medication 100 MG: at 13:01

## 2025-07-14 RX ADMIN — METRONIDAZOLE 500 MG: 500 INJECTION, SOLUTION INTRAVENOUS at 10:59

## 2025-07-14 RX ADMIN — SODIUM CHLORIDE: 9 INJECTION, SOLUTION INTRAVENOUS at 13:45

## 2025-07-14 RX ADMIN — ONDANSETRON 4 MG: 2 INJECTION, SOLUTION INTRAMUSCULAR; INTRAVENOUS at 15:09

## 2025-07-14 RX ADMIN — 0.12% CHLORHEXIDINE GLUCONATE 15 ML: 1.2 RINSE ORAL at 08:39

## 2025-07-14 RX ADMIN — WATER 2000 MG: 1 INJECTION INTRAMUSCULAR; INTRAVENOUS; SUBCUTANEOUS at 16:30

## 2025-07-14 RX ADMIN — ESMOLOL HYDROCHLORIDE 20 MG: 10 INJECTION, SOLUTION INTRAVENOUS at 14:52

## 2025-07-14 RX ADMIN — PROPOFOL 80 MG: 10 INJECTION, EMULSION INTRAVENOUS at 14:52

## 2025-07-14 RX ADMIN — FENTANYL CITRATE 25 MCG: 50 INJECTION, SOLUTION INTRAMUSCULAR; INTRAVENOUS at 15:11

## 2025-07-14 RX ADMIN — METRONIDAZOLE 500 MG: 500 INJECTION, SOLUTION INTRAVENOUS at 17:58

## 2025-07-14 RX ADMIN — SODIUM CHLORIDE, PRESERVATIVE FREE 10 ML: 5 INJECTION INTRAVENOUS at 20:47

## 2025-07-14 RX ADMIN — 0.12% CHLORHEXIDINE GLUCONATE 15 ML: 1.2 RINSE ORAL at 20:47

## 2025-07-14 RX ADMIN — DEXAMETHASONE SODIUM PHOSPHATE 10 MG: 4 INJECTION, SOLUTION INTRAMUSCULAR; INTRAVENOUS at 14:58

## 2025-07-14 ASSESSMENT — PAIN - FUNCTIONAL ASSESSMENT
PAIN_FUNCTIONAL_ASSESSMENT: 0-10

## 2025-07-14 ASSESSMENT — LIFESTYLE VARIABLES: SMOKING_STATUS: 0

## 2025-07-14 NOTE — ANESTHESIA POSTPROCEDURE EVALUATION
Department of Anesthesiology  Postprocedure Note    Patient: Kay Barrera  MRN: [id number]  YOB: 1935  Date of evaluation: 7/14/2025    Procedure Summary       Date: 07/14/25 Room / Location: [address]    Anesthesia Start: 1440 Anesthesia Stop: 1541    Procedures:       ENDOSCOPIC RETROGRADE CHOLANGIOPANCREATOGRAPHY SPHINCTER/PAPILLOTOMY      ENDOSCOPIC RETROGRADE CHOLANGIOPANCREATOGRAPHY BALLOON SWEEP      ENDOSCOPIC RETROGRADE CHOLANGIOPANCREATOGRAPHY STONE REMOVAL      ENDOSCOPIC RETROGRADE CHOLANGIOPANCREATOGRAPHY STENT INSERTION Diagnosis:       Choledocholithiasis      (Choledocholithiasis [K80.50])    Surgeons: Tian Chavarria MD Responsible Provider: Ai Monsalve DO    Anesthesia Type: General ASA Status: 3            Anesthesia Type: General    Dick Phase I: Dick Score: 10    Dick Phase II:      Anesthesia Post Evaluation    Patient location during evaluation: PACU  Patient participation: complete - patient participated  Level of consciousness: awake and alert  Pain score: 0  Airway patency: patent  Nausea & Vomiting: no nausea and no vomiting  Cardiovascular status: hemodynamically stable  Respiratory status: acceptable and room air  Hydration status: stable  Pain management: adequate        No notable events documented.

## 2025-07-14 NOTE — PLAN OF CARE
Problem: Pain  Goal: Verbalizes/displays adequate comfort level or baseline comfort level  7/14/2025 1018 by Mary Mckeon RN  Outcome: Progressing     Problem: ABCDS Injury Assessment  Goal: Absence of physical injury  7/14/2025 1018 by Mary Mckeon RN  Outcome: Progressing     Problem: Safety - Adult  Goal: Free from fall injury  7/14/2025 1018 by Mary Mckeon RN  Outcome: Progressing

## 2025-07-14 NOTE — BRIEF OP NOTE
Brief Postoperative Note      Patient: Kay Barrera  YOB: 1935  MRN: [id number]    Date of Procedure: 7/14/2025    Pre-Op Diagnosis Codes:      * Choledocholithiasis [K80.50]    Post-Op Diagnosis: Same.        Procedure(s):  ENDOSCOPIC RETROGRADE CHOLANGIOPANCREATOGRAPHY SPHINCTER/PAPILLOTOMY  ENDOSCOPIC RETROGRADE CHOLANGIOPANCREATOGRAPHY BALLOON SWEEP  ENDOSCOPIC RETROGRADE CHOLANGIOPANCREATOGRAPHY STONE REMOVAL  ENDOSCOPIC RETROGRADE CHOLANGIOPANCREATOGRAPHY STENT INSERTION    Surgeon(s):  Tian Chavarria MD    Assistant:  * No surgical staff found *    Anesthesia: * No anesthesia type entered *    Estimated Blood Loss (mL): less than 50     Complications: None    Specimens:   * No specimens in log *    Implants:  Implant Name Type Inv. Item Serial No.  Lot No. LRB No. Used Action   STENT BILI 10FR L5CM PLAS DBL PGTL SGL ADVANIX - [id number]  STENT BILI 10FR L5CM PLAS DBL PGTL SGL ADVANIX  [employer] [id number]  1 Implanted         Drains: * No LDAs found *    Findings:    Ampulla located within a diverticulum.  Previous sphincterotomy widely patent.  Cholangiogram with filling defects.  Balloon sweeps with removal of copious sludge and stones.  Plastic double pigtail biliary stent placed.     Electronically signed by Tian Chavarria MD on 7/14/2025 at 4:48 PM

## 2025-07-14 NOTE — PROGRESS NOTES
[facility] Hospitalist Progress Note    Admitting Date and Time: 7/12/2025  4:59 AM  Admit Dx: Pneumobilia [K83.8]  Nausea and vomiting, unspecified vomiting type [R11.2]    Subjective:  Patient is being followed for Pneumobilia [K83.8]  Nausea and vomiting, unspecified vomiting type [R11.2]   Pt was seen and examined today. Denies any new issues    ROS: denies fever, chills, cp, sob, n/v, HA unless stated above.     sodium chloride flush  5-40 mL IntraVENous 2 times per day    sodium chloride flush  5-40 mL IntraVENous 2 times per day    LORazepam  1 mg Oral BID    pantoprazole  20 mg Oral QAM AC    sodium chloride flush  5-40 mL IntraVENous 2 times per day    heparin (porcine)  5,000 Units SubCUTAneous BID    cefTRIAXone (ROCEPHIN) IV  2,000 mg IntraVENous Q24H    metroNIDAZOLE  500 mg IntraVENous Q8H    chlorhexidine  15 mL Mouth/Throat BID    indomethacin  100 mg Rectal See Admin Instructions     sodium chloride flush, 5-40 mL, PRN  sodium chloride, 25 mL, PRN  naloxone 0.4 mg in 10 mL sodium chloride syringe, , PRN  sodium chloride flush, 5-40 mL, PRN  sodium chloride, , PRN  HYDROmorphone, 0.5 mg, Q15 Min PRN  oxyCODONE, 5 mg, Once PRN  prochlorperazine, 5 mg, Once PRN  sodium chloride flush, 5-40 mL, PRN  sodium chloride, , PRN  ondansetron, 4 mg, Q8H PRN   Or  ondansetron, 4 mg, Q6H PRN  polyethylene glycol, 17 g, Daily PRN  HYDROmorphone, 0.25 mg, Q4H PRN  loperamide, 2 mg, 4x Daily PRN         Objective:    BP (!) 158/84   Pulse 81   Temp 97.5 °F (36.4 °C) (Oral)   Resp 18   Ht 1.499 m (4' 11.02\")   Wt 44 kg (97 lb)   SpO2 94%   BMI 19.58 kg/m²   General Appearance: alert and oriented to person, place and time and in no acute distress  Skin: warm and dry  Head: normocephalic and atraumatic  Pulmonary/Chest: clear to auscultation bilaterally- no wheezes, rales or rhonchi, normal air movement, no respiratory distress  Cardiovascular: normal rate, normal S1 and S2 and no carotid

## 2025-07-15 VITALS
HEART RATE: 84 BPM | RESPIRATION RATE: 20 BRPM | DIASTOLIC BLOOD PRESSURE: 66 MMHG | WEIGHT: 97.6 LBS | HEIGHT: 59 IN | TEMPERATURE: 97.7 F | OXYGEN SATURATION: 94 % | BODY MASS INDEX: 19.68 KG/M2 | SYSTOLIC BLOOD PRESSURE: 139 MMHG

## 2025-07-15 LAB
ALBUMIN SERPL-MCNC: 3.4 G/DL (ref 3.5–5.2)
ALP SERPL-CCNC: 339 U/L (ref 35–104)
ALT SERPL-CCNC: 112 U/L (ref 0–35)
ANION GAP SERPL CALCULATED.3IONS-SCNC: 16 MMOL/L (ref 7–16)
AST SERPL-CCNC: 53 U/L (ref 0–35)
BASOPHILS # BLD: 0.02 K/UL (ref 0–0.2)
BASOPHILS NFR BLD: 0 % (ref 0–2)
BILIRUB SERPL-MCNC: 0.5 MG/DL (ref 0–1.2)
BUN SERPL-MCNC: 19 MG/DL (ref 8–23)
CALCIUM SERPL-MCNC: 8.6 MG/DL (ref 8.8–10.2)
CHLORIDE SERPL-SCNC: 103 MMOL/L (ref 98–107)
CO2 SERPL-SCNC: 20 MMOL/L (ref 22–29)
CREAT SERPL-MCNC: 0.9 MG/DL (ref 0.5–1)
EKG ATRIAL RATE: 104 BPM
EKG P-R INTERVAL: 164 MS
EKG Q-T INTERVAL: 324 MS
EKG QRS DURATION: 94 MS
EKG QTC CALCULATION (BAZETT): 426 MS
EKG R AXIS: -25 DEGREES
EKG T AXIS: 99 DEGREES
EKG VENTRICULAR RATE: 104 BPM
EOSINOPHIL # BLD: 0 K/UL (ref 0.05–0.5)
EOSINOPHILS RELATIVE PERCENT: 0 % (ref 0–6)
ERYTHROCYTE [DISTWIDTH] IN BLOOD BY AUTOMATED COUNT: 12.5 % (ref 11.5–15)
GFR, ESTIMATED: 64 ML/MIN/1.73M2
GLUCOSE SERPL-MCNC: 181 MG/DL (ref 74–99)
HCT VFR BLD AUTO: 38 % (ref 34–48)
HGB BLD-MCNC: 13.2 G/DL (ref 11.5–15.5)
IMM GRANULOCYTES # BLD AUTO: 0.18 K/UL (ref 0–0.58)
IMM GRANULOCYTES NFR BLD: 2 % (ref 0–5)
LYMPHOCYTES NFR BLD: 0.93 K/UL (ref 1.5–4)
LYMPHOCYTES RELATIVE PERCENT: 8 % (ref 20–42)
MCH RBC QN AUTO: 29.1 PG (ref 26–35)
MCHC RBC AUTO-ENTMCNC: 34.7 G/DL (ref 32–34.5)
MCV RBC AUTO: 83.9 FL (ref 80–99.9)
MONOCYTES NFR BLD: 0.19 K/UL (ref 0.1–0.95)
MONOCYTES NFR BLD: 2 % (ref 2–12)
NEUTROPHILS NFR BLD: 89 % (ref 43–80)
NEUTS SEG NFR BLD: 10.57 K/UL (ref 1.8–7.3)
PHOSPHATE SERPL-MCNC: 2.8 MG/DL (ref 2.5–4.5)
PLATELET # BLD AUTO: 320 K/UL (ref 130–450)
PMV BLD AUTO: 9.7 FL (ref 7–12)
POTASSIUM SERPL-SCNC: 3.7 MMOL/L (ref 3.5–5.1)
PROT SERPL-MCNC: 6.7 G/DL (ref 6.4–8.3)
RBC # BLD AUTO: 4.53 M/UL (ref 3.5–5.5)
SODIUM SERPL-SCNC: 138 MMOL/L (ref 136–145)
WBC OTHER # BLD: 11.9 K/UL (ref 4.5–11.5)

## 2025-07-15 PROCEDURE — 84100 ASSAY OF PHOSPHORUS: CPT

## 2025-07-15 PROCEDURE — 85025 COMPLETE CBC W/AUTO DIFF WBC: CPT

## 2025-07-15 PROCEDURE — 6370000000 HC RX 637 (ALT 250 FOR IP)

## 2025-07-15 PROCEDURE — 93010 ELECTROCARDIOGRAM REPORT: CPT | Performed by: INTERNAL MEDICINE

## 2025-07-15 PROCEDURE — 6360000002 HC RX W HCPCS: Performed by: STUDENT IN AN ORGANIZED HEALTH CARE EDUCATION/TRAINING PROGRAM

## 2025-07-15 PROCEDURE — 6360000002 HC RX W HCPCS: Performed by: INTERNAL MEDICINE

## 2025-07-15 PROCEDURE — 80053 COMPREHEN METABOLIC PANEL: CPT

## 2025-07-15 PROCEDURE — 6370000000 HC RX 637 (ALT 250 FOR IP): Performed by: INTERNAL MEDICINE

## 2025-07-15 PROCEDURE — 99239 HOSP IP/OBS DSCHRG MGMT >30: CPT

## 2025-07-15 PROCEDURE — 2500000003 HC RX 250 WO HCPCS: Performed by: INTERNAL MEDICINE

## 2025-07-15 RX ORDER — LOPERAMIDE HYDROCHLORIDE 2 MG/1
2 CAPSULE ORAL PRN
Qty: 10 CAPSULE | Refills: 0 | Status: CANCELLED | OUTPATIENT
Start: 2025-07-15 | End: 2025-07-25

## 2025-07-15 RX ADMIN — PANTOPRAZOLE SODIUM 20 MG: 20 TABLET, DELAYED RELEASE ORAL at 05:36

## 2025-07-15 RX ADMIN — HEPARIN SODIUM 5000 UNITS: 5000 INJECTION INTRAVENOUS; SUBCUTANEOUS at 08:35

## 2025-07-15 RX ADMIN — LORAZEPAM 1 MG: 1 TABLET ORAL at 08:35

## 2025-07-15 RX ADMIN — SODIUM CHLORIDE, PRESERVATIVE FREE 10 ML: 5 INJECTION INTRAVENOUS at 08:36

## 2025-07-15 RX ADMIN — 0.12% CHLORHEXIDINE GLUCONATE 15 ML: 1.2 RINSE ORAL at 08:36

## 2025-07-15 RX ADMIN — METRONIDAZOLE 500 MG: 500 INJECTION, SOLUTION INTRAVENOUS at 08:42

## 2025-07-15 RX ADMIN — METRONIDAZOLE 500 MG: 500 INJECTION, SOLUTION INTRAVENOUS at 02:14

## 2025-07-15 ASSESSMENT — PAIN SCALES - GENERAL
PAINLEVEL_OUTOF10: 0
PAINLEVEL_OUTOF10: 0

## 2025-07-15 NOTE — DISCHARGE SUMMARY
[facility] Hospitalist Physician Discharge Summary       Lilianajennifer Luis, APRN - CNP  [address]  [address]  [facility] [address]  [phone]    Schedule an appointment as soon as possible for a visit  Follow up: Thursday July 17th at 10:15 am      Activity level: As tolerated   Dispo: Home   Condition on discharge: Stable     Patient ID:  Kay Barrera  [id number]  89 y.o.  8/9/1935    Admit date: 7/12/2025  Discharge date and time:  7/15/2025  3:27 PM    Admission Diagnoses: Principal Problem:    Pneumobilia  Active Problems:    Nausea and vomiting    Choledocholithiasis  Resolved Problems:    * No resolved hospital problems. *      Discharge Diagnoses: Principal Problem:    Pneumobilia  Active Problems:    Nausea and vomiting    Choledocholithiasis  Resolved Problems:    * No resolved hospital problems. *      Consults:  IP CONSULT TO GI  IP CONSULT TO HOSPITALIST    Procedures: ERCP     Hospital Course:   Patient Kay Barrera is a 89 y.o. with with history of anxiety, biliary pancreatitis s/p stent placement who presented to the hospital with chief complaint of upper abdominal pain associated with nausea, vomiting and diarrhea for the last 10 days.  In the ER patient was found to be hypertensive and tachycardic and blood work did show mild transaminitis.  CT abdomen pelvis with extensive pneumobilia with increased intrahepatic and extrahepatic biliary dilation.  Patient was given Zosyn, GI cocktail, fentanyl and Dilaudid.  Zosyn was changed to Cipro and metronidazole.  GI was consulted and patient did undergo ERCP on 07/14 where copious sludge and stones were removed.  Plastic double-pigtail biliary was placed as well.  The patient did tolerate the procedure.  She has no acute complaint today.  Patient will be discharged home.        Discharge Exam:  General Appearance: alert and oriented to person, place and time and in no acute distress  Skin: warm and dry  Head:  condition->Emergency Medical Condition (MA) FINDINGS: Unless otherwise indicated or stated incidental findings do not require dedicated follow-up imaging. Lower Chest: Lung bases are clear. Organs: Liver without focal parenchymal lesion demonstrates extensive pneumobilia which has increased prior comparison increased intrahepatic and extrahepatic biliary dilatation with common bile duct dilated mixed signals of pneumobilia densities calcification 16 mm at the level the whitley hepatis partially observed on prior cm node CBD involvement status post cholecystectomy pancreas ductal dilatation atrophy spleen is.  Adrenals without nodule.  Kidneys without suspicious renal lesion and no hydronephrosis. GI/Bowel: No focal thickening or disproportion dilatation of bowel.  No inflammatory findings. Pelvis: No suspicious pelvic lesion or bulky pelvic adenopathy/free fluid. Peritoneum/Retroperitoneum: No bulky retroperitoneal adenopathy. No suspicious peritoneal or mesenteric process Vasculature: Grossly normal caliber of abdominal aorta and vasculature Bones/Soft Tissues: No acute osseous or soft tissue findings.     1. Extensive pneumobilia with increased intrahepatic and extrahepatic biliary dilatation with common bile duct dilated mixed signals of pneumobilia densities calcification 16 mm at the level the whitley hepatis partially observed on prior comparison. Correlate with LFTs and biochemical values for the need of further investigation such as MRCP.  Considerations for stricture or abnormal filling defect within the distal CBD grading obstructive elements with progression from prior. 2. No evidence of bowel obstruction or inflammatory findings.       Patient Instructions:      Medication List        CONTINUE taking these medications      clorazepate 7.5 MG tablet  Commonly known as: TRANXENE            ASK your doctor about these medications      pantoprazole 20 MG tablet  Commonly known as: Protonix  Take 1 tablet by

## 2025-07-15 NOTE — CARE COORDINATION
Introduced my self and provided explanation of CM role to patient. Admitted for pneumobilia. GI is following, s/p ERCP 7/14. She voices she resides alone in high rise apt. With elevator access and completes her adl's with independence. No DME. No pcp on file, patient states she is established with Dr. Tejada. Discharge order noted, plan is home with no needs identified. Patients, daughter, Deidre will transport upon discharge.  Mary MONTENEGRON, RN  Care Management

## 2025-07-15 NOTE — ACP (ADVANCE CARE PLANNING)
Advance Care Planning   Healthcare Decision Maker:    Primary Decision Maker: Deidre Melendez - [phone]    Click here to complete Healthcare Decision Makers including selection of the Healthcare Decision Maker Relationship (ie \"Primary\").  Today we documented Decision Maker(s) consistent with Legal Next of Kin hierarchy.

## 2025-07-15 NOTE — PROGRESS NOTES
Discharge instructions reviewed with patient. Iv has been removed. Will transport home via private vehicle with daughter

## 2025-07-15 NOTE — PROGRESS NOTES
Ok to DC per Dr. Chavarria.     Electronically signed by Barbara Garcia RN on 7/15/2025 at 9:36 AM

## 2025-07-20 PROBLEM — K83.1 BILIARY OBSTRUCTION (HCC): Status: ACTIVE | Noted: 2021-08-30

## 2025-07-22 ENCOUNTER — TELEPHONE (OUTPATIENT)
Age: 89
End: 2025-07-22

## 2025-07-22 PROBLEM — K83.1 OBSTRUCTION OF BILE DUCT (HCC): Status: ACTIVE | Noted: 2025-07-22

## 2025-07-22 PROBLEM — K85.90 ACUTE PANCREATITIS: Status: ACTIVE | Noted: 2025-07-22

## 2025-07-22 NOTE — TELEPHONE ENCOUNTER
Patient notified of ERCP scheduled for 12-2-2025 at [facility] at 11:30.   sent to home also. Electronically signed by AVE PAREDES LPN on 7/22/2025 at 1:04 PM

## (undated) DEVICE — LOCKING DEVICE AND BIOPSY CAP FOR USE WITH RX BILIARY SYSTEM: Brand: RX LOCKING DEVICE AND BIOPSY CAP

## (undated) DEVICE — CONTAINER SPEC COLL 960ML POLYPR TRIANG GRAD INTAKE/OUTPUT

## (undated) DEVICE — TUBING, SUCTION, 1/4" X 10', STRAIGHT: Brand: MEDLINE

## (undated) DEVICE — SPHINCTEROTOME: Brand: HYDRATOME RX 44

## (undated) DEVICE — GAUZE,SPONGE,4"X4",4PLY,STRL,LF: Brand: MEDLINE

## (undated) DEVICE — CANNULATING SPHINCTEROTOME: Brand: JAGTOME™ REVOLUTION RX

## (undated) DEVICE — ELECTRODE PT RET AD L9FT HI MOIST COND ADH HYDRGEL CORDED

## (undated) DEVICE — KIT BEDSIDE REVITAL OX 500ML

## (undated) DEVICE — RETRIEVAL BALLOON CATHETER: Brand: EXTRACTOR™ PRO RX

## (undated) DEVICE — PATIENT RETURN ELECTRODE, SINGLE-USE, CONTACT QUALITY MONITORING, ADULT, WITH 9FT CORD, FOR PATIENTS WEIGING OVER 33LBS. (15KG): Brand: MEGADYNE

## (undated) DEVICE — SPONGE GZ 4IN 4IN 4 PLY N WVN AVANT

## (undated) DEVICE — Device: Brand: DEFENDO VALVE AND CONNECTOR KIT

## (undated) DEVICE — COVER,LIGHT HANDLE,FLX,1/PK: Brand: MEDLINE INDUSTRIES, INC.

## (undated) DEVICE — BLOCK BITE 60FR RUBBER ADLT DENTAL

## (undated) DEVICE — SPHINCTEROTOME: Brand: JAGTOME RX 39

## (undated) DEVICE — SINGLE-USE POLYPECTOMY SNARE: Brand: CAPTIVATOR

## (undated) DEVICE — TOWEL,OR,DSP,ST,BLUE,STD,6/PK,12PK/CS: Brand: MEDLINE

## (undated) DEVICE — GOWN ISOLATN REG YEL M WT MULTIPLY SIDETIE LEV 2

## (undated) DEVICE — FORCEPS BX L240CM JAW DIA2.4MM WRK CHN 2.8MM ORNG L CAP W/

## (undated) DEVICE — GRADUATE TRIANG MEASURE 1000ML BLK PRNT

## (undated) DEVICE — RX DELIVERY SYSTEM: Brand: NAVIFLEX™ RX DELIVERY SYSTEM